# Patient Record
Sex: FEMALE | Race: WHITE | Employment: OTHER | URBAN - METROPOLITAN AREA
[De-identification: names, ages, dates, MRNs, and addresses within clinical notes are randomized per-mention and may not be internally consistent; named-entity substitution may affect disease eponyms.]

---

## 2017-06-14 ENCOUNTER — HOSPITAL ENCOUNTER (OUTPATIENT)
Dept: MAMMOGRAPHY | Age: 77
Discharge: HOME | End: 2017-06-14
Attending: INTERNAL MEDICINE | Admitting: INTERNAL MEDICINE

## 2017-06-14 DIAGNOSIS — Z12.31 ENCOUNTER FOR SCREENING MAMMOGRAM FOR MALIGNANT NEOPLASM OF BREAST: ICD-10-CM

## 2017-06-14 DIAGNOSIS — R92.8 OTHER ABNORMAL AND INCONCLUSIVE FINDINGS ON DIAGNOSTIC IMAGING OF BREAST: ICD-10-CM

## 2018-04-05 ENCOUNTER — HOSPITAL ENCOUNTER (OUTPATIENT)
Dept: GENERAL RADIOLOGY | Age: 78
Discharge: HOME | End: 2018-04-05
Attending: INTERNAL MEDICINE | Admitting: INTERNAL MEDICINE

## 2018-04-05 DIAGNOSIS — M81.0 AGE-RELATED OSTEOPOROSIS WITHOUT CURRENT PATHOLOGICAL FRACTURE: ICD-10-CM

## 2018-04-05 DIAGNOSIS — M81.0 OSTEOPOROSIS, UNSPECIFIED OSTEOPOROSIS TYPE, UNSPECIFIED PATHOLOGICAL FRACTURE PRESENCE: ICD-10-CM

## 2018-08-31 ENCOUNTER — HOSPITAL ENCOUNTER (OUTPATIENT)
Dept: MAMMOGRAPHY | Age: 78
Discharge: HOME | End: 2018-08-31
Payer: MEDICARE

## 2018-08-31 DIAGNOSIS — Z12.31 ENCOUNTER FOR SCREENING MAMMOGRAM FOR BREAST CANCER: ICD-10-CM

## 2018-08-31 PROCEDURE — 77067 SCR MAMMO BI INCL CAD: CPT

## 2019-04-01 ENCOUNTER — HOSPITAL ENCOUNTER (OUTPATIENT)
Dept: GENERAL RADIOLOGY | Age: 79
Discharge: HOME | End: 2019-04-01
Payer: MEDICARE

## 2019-04-01 ENCOUNTER — HOSPITAL ENCOUNTER (OUTPATIENT)
Age: 79
Discharge: HOME | End: 2019-04-01
Payer: MEDICARE

## 2019-04-01 DIAGNOSIS — R52 PAIN: ICD-10-CM

## 2019-04-01 PROCEDURE — 73000 X-RAY EXAM OF COLLAR BONE: CPT

## 2019-06-03 ENCOUNTER — OFFICE VISIT (OUTPATIENT)
Dept: ORTHOPEDIC SURGERY | Age: 79
End: 2019-06-03
Payer: MEDICARE

## 2019-06-03 VITALS — BODY MASS INDEX: 23.3 KG/M2 | HEIGHT: 66 IN | WEIGHT: 145 LBS

## 2019-06-03 DIAGNOSIS — M54.2 NECK PAIN: ICD-10-CM

## 2019-06-03 DIAGNOSIS — M47.22 OSTEOARTHRITIS OF SPINE WITH RADICULOPATHY, CERVICAL REGION: ICD-10-CM

## 2019-06-03 DIAGNOSIS — M25.511 ACUTE PAIN OF RIGHT SHOULDER: Primary | ICD-10-CM

## 2019-06-03 PROCEDURE — 99203 OFFICE O/P NEW LOW 30 MIN: CPT | Performed by: ORTHOPAEDIC SURGERY

## 2019-06-03 NOTE — PROGRESS NOTES
Chief Complaint  New Patient (Cervical: no injury , stiffness and pain started a couple of months ago, some numbness/tingling that comes and goes, the pain shoots down into neck on bilateral sides, but only has upper trap pain on the right side, increase pain with turning head to the right )      History of Present Illness:  Jack Quezada is a 66 y.o. y/o female who presents today for evaluation of her neck and shoulder pain. She denies any trauma. She states the last few months she has had some worsening pain in the neck and right side of the shoulder. She has taken some ibuprofen. She does occasionally have some numbness and tingling in her right upper extremity which extends down her arm and to her hand at times. She denies any contralateral symptoms. She denies any weakness. She denies any difficulty with small motor tasks. She denies any prior surgery or injections or therapy for her neck or shoulder.       Occupation/activities: Retired    Medical History  Past Medical History:   Diagnosis Date    Anesthesia complication     \" thrashed about\"    Cancer ([facility] [address] breast    Hyperlipidemia     Hypertension     S/P chemotherapy, time since greater than 12 weeks     Wears dentures        Past Surgical History:   Procedure Laterality Date    BREAST SURGERY Left     mastectomy    BREAST SURGERY Right     implant    CHOLECYSTECTOMY      COLONOSCOPY      HEMORRHOID SURGERY      HYSTERECTOMY      KNEE CARTILAGE SURGERY Left     TOTAL KNEE ARTHROPLASTY Left 11/8/2013       Social History     Socioeconomic History    Marital status:      Spouse name: Not on file    Number of children: Not on file    Years of education: Not on file    Highest education level: Not on file   Occupational History    Not on file   Social Needs    Financial resource strain: Not on file    Food insecurity:     Worry: Not on file     Inability: Not on file    Transportation needs:     Medical: Not on file     Non-medical: Not on file   Tobacco Use    Smoking status: Never Smoker    Smokeless tobacco: Never Used   Substance and Sexual Activity    Alcohol use: No    Drug use: No    Sexual activity: Not Currently   Lifestyle    Physical activity:     Days per week: Not on file     Minutes per session: Not on file    Stress: Not on file   Relationships    Social connections:     Talks on phone: Not on file     Gets together: Not on file     Attends Synagogue service: Not on file     Active member of club or organization: Not on file     Attends meetings of clubs or organizations: Not on file     Relationship status: Not on file    Intimate partner violence:     Fear of current or ex partner: Not on file     Emotionally abused: Not on file     Physically abused: Not on file     Forced sexual activity: Not on file   Other Topics Concern    Not on file   Social History Narrative    Not on file       No family history on file. Review of Systems  Pertinent items are noted in HPI  Review of systems reviewed from Patient History Form dated on 6/3/19 and available in the patient's chart under the Media tab. Vital Signs  There were no vitals filed for this visit. General Exam:   Constitutional: Patient is adequately groomed with no evidence of malnutrition  Mental Status: The patient is oriented to time, place and person. The patient's mood and affect are appropriate. Lymphatic: The lymphatic examination bilaterally reveals all areas to be without enlargement or induration. Neurological: The patient has good coordination. There is no weakness or sensory deficit.      Gait: Normal    Spine Examination  Inspection:  No bruising or atrophy or deformity    Palpation:  Paraspinal and trapezial tenderness mostly right side    Range of Motion:  Moderate stiffness with flexion and extension cervical spine, normal extremity range of motion    Sensation: Intact to light touch C5 to T1 bilateral    Strength:  5/5 shoulder abduction, elbow flexion, elbow extension, wrist flexion, wrist extension, finger abduction    Special Tests: Negative Spurling's today    Skin: There are no additional worrisome rashes, ulcerations or lesions. Circulation normal      Radiology:     X-rays obtained and reviewed in office:  AP and lateral 2 views cervical spine obtained 6/3/19 demonstrate significant degenerative changes most significant at C4-C5, C5-C6, and C6-C7 with decreased disc space and osteophyte formation. No major laura-or retrolisthesis      Assessment:  70-year-old female with cervical DJD and right upper extremity radiculopathy    Office Procedures:  Orders Placed This Encounter   Procedures    XR CERVICAL SPINE (2-3 VIEWS)     Standing Status:   Future     Number of Occurrences:   1     Standing Expiration Date:   6/3/2020   Miguel Pineda PT [facility]     Referral Priority:   Routine     Referral Type:   Eval and Treat     Referral Reason:   Specialty Services Required     Requested Specialty:   Physical Therapy     Number of Visits Requested:   1       Plan:   -At this time we will get her to physical therapy to work on her cervical symptoms  -In addition she may continue with over-the-counter medications, ice/heat, activity modification  -We discussed that if she continues with significant symptoms of neck pain and/or radiculopathy we can consider other pharmacological treatment such as a steroid Dosepak and also referral for further treatment with one of our spine interventional specialist and possible injections or other treatment  -If there are issues interim she'll contact the office    MD Helio King 58 partner of [facility]    Voice Recognition Dictation disclaimer: Please note that portions of this chart were generated using Dragon dictation software.  Although every effort was made to ensure the accuracy of this automated transcription, some errors in transcription may have occurred.

## 2019-06-04 ENCOUNTER — HOSPITAL ENCOUNTER (OUTPATIENT)
Dept: PHYSICAL THERAPY | Age: 79
Setting detail: THERAPIES SERIES
Discharge: HOME | End: 2019-06-04
Payer: MEDICARE

## 2019-06-04 PROCEDURE — 97161 PT EVAL LOW COMPLEX 20 MIN: CPT | Performed by: SPECIALIST

## 2019-06-04 PROCEDURE — 97012 MECHANICAL TRACTION THERAPY: CPT | Performed by: SPECIALIST

## 2019-06-04 PROCEDURE — 97140 MANUAL THERAPY 1/> REGIONS: CPT | Performed by: SPECIALIST

## 2019-06-04 PROCEDURE — 97110 THERAPEUTIC EXERCISES: CPT | Performed by: SPECIALIST

## 2019-06-04 NOTE — PLAN OF CARE
[address] [facility] and [address] 3560 [facility], [address] [place] [address] [address]  Phone: [phone]   Fax:     [phone]     Beatriz Skinner    Dear  Dr Darcy Sawant,    We had the pleasure of evaluating the following patient for physical therapy services at [address] [place] [address]. A summary of our findings can be found in the initial assessment below. This includes our plan of care. If you have any questions or concerns regarding these findings, please do not hesitate to contact me at the office phone number checked above. Thank you for the referral.       Physician Signature:_______________________________Date:__________________  By signing above (or electronic signature), therapists plan is approved by physician      Patient: Yen Cuevas   : 1940   MRN: [id number]  Referring Physician:  Dr Darcy Sawant      Evaluation Date: 2019      Medical Diagnosis Information:      M54.2 (ICD-10-CM) - Neck pain   M47.22 (ICD-10-CM) - Osteoarthritis of spine with radiculopathy, cervical region                                                  Insurance information:  [facility]    Precautions/ Contra-indications: CA    Latex Allergy:  [x]NO      []YES  Preferred Language for Healthcare:   [x]English       []other:    SUBJECTIVE: Patient states insidious onset a few months ago with cervical pain, difficulty with rotation.     Relevant Medical History:CA, HTN  Functional Disability Index:      Pain Scale: 5/10  Easing factors: UE exercises  Provocative factors: rotation, sleeping , sleeping supine    Type: []Constant   [x]Intermittent  []Radiating []Localized []other:     Numbness/Tingling: R  forearm    Occupation/School:retired     Living Status/Prior Level of Function: Independent with ADLs and IADLs,     OBJECTIVE:     CERV ROM     Cervical Flexion 15    Cervical Extension 40    Cervical SB R 10/ L 15    Cervical rotation          ROM Left Right Shoulder Flex WFL all motions    Shoulder Abd     Shoulder ER     Shoulder IR               Strength  Left Right   Shoulder Flex 4+ 4+   Shoulder Scap 4+ 4+   Shoulder ER     Shoulder IR               Reflexes Left Right   C5-6 Biceps     C5-6 Brachioradialis     C7-8 Triceps       Reflexes/Sensation:    [x]Dermatomes/Myotomes intact    [x]Reflexes equal and normal bilaterally   []Other:    Joint mobility:    [x]Normal    []Hypo   []Hyper    Palpation: Tenderness C/UT    Functional Mobility/Transfers: WNL    Posture: WFL    Bandages/Dressings/Incisions: intact    Gait: (include devices/WB status): WNL     Orthopedic Special Tests: C compression pain  Traction less pain                       [x] Patient history, allergies, meds reviewed. Medical chart reviewed. See intake form. Review Of Systems (ROS):  [x]Performed Review of systems (Integumentary, CardioPulmonary, Neurological) by intake and observation. Intake form has been scanned into medical record. Patient has been instructed to contact their primary care physician regarding ROS issues if not already being addressed at this time.       Co-morbidities/Complexities (which will affect course of rehabilitation):   []None           Arthritic conditions   []Rheumatoid arthritis (M05.9)  []Osteoarthritis (M19.91)   Cardiovascular conditions   [x]Hypertension (I10)  []Hyperlipidemia (E78.5)  []Angina pectoris (I20)  []Atherosclerosis (I70)   Musculoskeletal conditions   []Disc pathology   []Congenital spine pathologies   []Prior surgical intervention  []Osteoporosis (M81.8)  []Osteopenia (M85.8)   Endocrine conditions   []Hypothyroid (E03.9)  []Hyperthyroid Gastrointestinal conditions   []Constipation (U44.62)   Metabolic conditions   []Morbid obesity (E66.01)  []Diabetes type 1(E10.65) or 2 (E11.65)   []Neuropathy (G60.9)     Pulmonary conditions   []Asthma (J45)  []Coughing   []COPD (J44.9)   Psychological Disorders  []Anxiety (F41.9)  []Depression (F32.9) []Other:   []Other:          Barriers to/and or personal factors that will affect rehab potential:              []Age  []Sex              []Motivation/Lack of Motivation                        []Co-Morbidities              []Cognitive Function, education/learning barriers              []Environmental, home barriers              []profession/work barriers  []past PT/medical experience  []other:  Justification:     Falls Risk Assessment (30 days):   [x] Falls Risk assessed and no intervention required.   [] Falls Risk assessed and Patient requires intervention due to being higher risk   TUG score (>12s at risk):     [] Falls education provided, including       G-Codes:   NDI 40%    ASSESSMENT:    Functional Impairments:     []Noted cervical/thoracic/GHJ joint hypomobility   []Noted cervical/thoracic/GHJ joint hypermobility   [x]Decreased cervical/UE functional ROM   []Noted Headache pain aggravated by neck movements with/without dizziness   []Abnormal reflexes/sensation/myotomal/dermatomal deficits   []Decreased DCF control or ability to hold head up   [x]Decreased RC/scapular/core strength and neuromuscular control    [x]Decreased UE functional strength   []other:      Functional Activity Limitations (from functional questionnaire and intake)   [x]Reduced ability to tolerate prolonged functional positions   []Reduced ability or difficulty with changes of positions or transfers between positions   [x]Reduced ability to maintain good posture and demonstrate good body mechanics with sitting, bending, and lifting   [x] Reduced ability or tolerance with driving and/or computer work   [x]Reduced ability to perform lifting, reaching, carrying tasks   []Reduced ability to concentrate   [x]Reduced ability to sleep    []Reduced ability to tolerate any impact through UE or spine   [x]Reduced ability to ambulate prolonged functional periods/distances   []other:    Participation Restrictions   [x]Reduced participation in self care activities   [x]Reduced participation in home management activities   []Reduced participation in work activities   []Reduced participation in social activities. []Reduced participation in sport/recreational activities. Classification/Subgrouping:   [x]signs/symptoms consistent with neck pain with mobility deficits     []signs/symptoms consistent with neck pain with movement coordinated impairments    [x]signs/symptoms consistent with neck pain with radiating pain    []signs/symptoms consistent with neck pain with headaches (cervicogenic)    []Signs/symptoms consistent with nerve root involvement including myotome & dermatome dysfunction   []sign/symptoms consistent with facet dysfunction of cervical and thoracic spine    []signs/symptoms consistent suggesting central cord compression/UMN syndromes   []signs/symptoms consistent with discogenic cervical pain   []signs/symptoms consistent with rib dysfunction   []signs/symptoms consistent with postural dysfunction   []signs/symptoms consistent with shoulder pathology    []signs/symptoms consistent with post-surgical status including decreased ROM, strength and function.    [x]signs/symptoms consistent with pathology which may benefit from Dry Needling   []signs/symptoms which may limit the use of advanced manual therapy techniques: (Hypertension, recent trauma, intolerance to end range positions, prior TIA, visual issues, UE myotomes loss )     Prognosis/Rehab Potential:      []Excellent   [x]Good    []Fair   []Poor    Tolerance of evaluation/treatment:    []Excellent   [x]Good    []Fair   []Poor    Physical Therapy Evaluation Complexity Justification  [x] A history of present problem with:  [x] no personal factors and/or comorbidities that impact the plan of care;  []1-2 personal factors and/or comorbidities that impact the plan of care  []3 personal factors and/or comorbidities that impact the plan of care  [x] An examination of body systems using standardized tests and measures addressing any of the following: body structures and functions (impairments), activity limitations, and/or participation restrictions;:  [x] a total of 1-2 or more elements   [] a total of 3 or more elements   [] a total of 4 or more elements   [x] A clinical presentation with:  [x] stable and/or uncomplicated characteristics   [] evolving clinical presentation with changing characteristics  [] unstable and unpredictable characteristics;   [x] Clinical decision making of [x] low, [] moderate, [] high complexity using standardized patient assessment instrument and/or measurable assessment of functional outcome. [x] EVAL (LOW) 09655 (typically 20 minutes face-to-face)  [] EVAL (MOD) 82901 (typically 30 minutes face-to-face)  [] EVAL (HIGH) 64135 (typically 45 minutes face-to-face)  [] RE-EVAL     PLAN:   Frequency/Duration:  1-2 days per week for 6 Weeks:  Interventions:  [x]  Therapeutic exercise including: strength training, ROM, for cervical spine,scapula, core and Upper extremity, including postural re-education. [x]  NMR activation and proprioception for Deep cervical flexors, periscapular and RC muscles and Core, including postural re-education. [x]  Manual therapy as indicated for C/T spine, ribs, Soft tissue to include: Dry Needling/IASTM, STM, PROM, Gr I-IV mobilizations, manipulation. [x] Modalities as needed that may include: thermal agents, E-stim, Biofeedback, US, iontophoresis as indicated  [x] Patient education on joint protection, postural re-education, activity modification, progression of HEP. HEP instruction: instructed and given handouts(see scanned forms)    GOALS:  Patient stated goal: sleep/turn head    Therapist goals for Patient:   Short Term Goals: To be achieved in: 2 weeks  1. Independent in HEP and progression per patient tolerance, in order to prevent re-injury.    2. Patient will have a decrease in pain to facilitate improvement in movement, function, and ADLs as indicated by Functional Deficits. Long Term Goals: To be achieved in: 6 weeks  1. Disability index score of 20% or less for the NDI to assist with reaching prior level of function. 2. Patient will demonstrate increased AROM to [facility] of cervical/thoracic spine to allow for proper joint functioning as indicated by patients Functional Deficits. 3. Patient will demonstrate an increase in postural awareness and control and activation of  Deep cervical stabilizers to allow for proper functional mobility as indicated by patients Functional Deficits. 4. Patient will return to [facility] for functional activities without increased symptoms or restriction.    5. Sleep with min to no limitations.(patient specific functional goal)       Electronically signed by:  Nic Floyd PT

## 2019-06-04 NOTE — FLOWSHEET NOTE
[address]    [place], [address]                Physical Therapy Daily Treatment Note  Date:  2019    Patient Name:  Selene Thornton    :  1940  MRN: [id number]  Restrictions/Precautions:  none  Medical/Treatment Diagnosis Information:      M54.2 (ICD-10-CM) - Neck pain   M47.22 (ICD-10-CM) - Osteoarthritis of spine with radiculopathy, cervical region     ·   ·    Insurance/Certification information:   [employer]na  W Rocky Pierce  Physician Information:   Dr Deb Dow  Plan of care signed (Y/N):     Date of Patient follow up with Physician:     G-Code (if applicable):      Date G-Code Applied:      NDI 40%    Progress Note: [x]  Yes  []  No  Next due by: Visit #10      Latex Allergy:  [x]NO      []YES  Preferred Language for Healthcare:   [x]English       []other:    Visit # Insurance Allowable   1 A[employer] MC 90/10med nec     Pain level:  5/10     SUBJECTIVE:  See eval    OBJECTIVE: See eval  Observation:   Test measurements:      RESTRICTIONS/PRECAUTIONS: none    Exercises/Interventions:   Therapeutic Ex Sets/sec Reps Notes HEP   Chin tucks/ also supine  10x  x   PB rows/PB rolls GR 10x  x   UT stretch 20 2x  x                        PB B HAB  NV            UBE rev  NV                                                                           Manual Intervention       MFR/ distraction/glides  8 min                                               NMR re-education                                                        C traction 19/5 10 min         Therapeutic Exercise and NMR EXR  [x] (30090) Provided verbal/tactile cueing for activities related to strengthening, flexibility, endurance, ROM  for improvements in scapular, scapulothoracic and UE control with self care, reaching, carrying, lifting, house/yardwork, driving/computer work.    [] (93465) Provided verbal/tactile cueing for activities related to improving balance, coordination, kinesthetic sense, posture, motor skill, proprioception  to assist with  scapular, scapulothoracic and UE control with self care, reaching, carrying, lifting, house/yardwork, driving/computer work. Therapeutic Activities:    [x] (76430 or 94746) Provided verbal/tactile cueing for activities related to improving balance, coordination, kinesthetic sense, posture, motor skill, proprioception and motor activation to allow for proper function of scapular, scapulothoracic and UE control with self care, carrying, lifting, driving/computer work.      Home Exercise Program:    [x] (62492) Reviewed/Progressed HEP activities related to strengthening, flexibility, endurance, ROM of scapular, scapulothoracic and UE control with self care, reaching, carrying, lifting, house/yardwork, driving/computer work  [] (53302) Reviewed/Progressed HEP activities related to improving balance, coordination, kinesthetic sense, posture, motor skill, proprioception of scapular, scapulothoracic and UE control with self care, reaching, carrying, lifting, house/yardwork, driving/computer work      Manual Treatments:  PROM / STM / Oscillations-Mobs:  G-I, II, III, IV (PA's, Inf., Post.)  [x] (09945) Provided manual therapy to mobilize soft tissue/joints of cervical/CT, scapular GHJ and UE for the purpose of modulating pain, promoting relaxation,  increasing ROM, reducing/eliminating soft tissue swelling/inflammation/restriction, improving soft tissue extensibility and allowing for proper ROM for normal function with self care, reaching, carrying, lifting, house/yardwork, driving/computer work    Modalities: C traction     Charges:  Timed Code Treatment Minutes: 25   Total Treatment Minutes: 55     [x] EVAL  [x] VS(42151) x  1   [] IONTO  [] NMR (38290) x      [] VASO  [x] Manual (68882) x  1    [] Other:  [] TA x       [x] Mech Traction (58808)  [] ES(attended) (41039)      [] ES (un) (20926):     GOALS:   Patient stated goal: sleep/turn head    Therapist goals for Patient:   Short Term Goals: To be achieved in: 2 weeks  1. Independent in HEP and progression per patient tolerance, in order to prevent re-injury. 2. Patient will have a decrease in pain to facilitate improvement in movement, function, and ADLs as indicated by Functional Deficits. Long Term Goals: To be achieved in: 6 weeks  1. Disability index score of 20% or less for the NDI to assist with reaching prior level of function. 2. Patient will demonstrate increased AROM to [facility] of cervical/thoracic spine to allow for proper joint functioning as indicated by patients Functional Deficits. 3. Patient will demonstrate an increase in postural awareness and control and activation of  Deep cervical stabilizers to allow for proper functional mobility as indicated by patients Functional Deficits. 4. Patient will return to [facility] for functional activities without increased symptoms or restriction. 5. Sleep with min to no limitations.(patient specific functional goal)       Progression Towards Functional goals:  [] Patient is progressing as expected towards functional goals listed. [] Progression is slowed due to complexities listed. [] Progression has been slowed due to co-morbidities.   [x] Plan just implemented, too soon to assess goals progression  [] Other:     ASSESSMENT:  See eval    Treatment/Activity Tolerance:  [x] Patient tolerated treatment well [] Patient limited by fatique  [] Patient limited by pain  [] Patient limited by other medical complications  [] Other:     Prognosis: [x] Good [] Fair  [] Poor    Patient Requires Follow-up: [x] Yes  [] No    PLAN: See eval  [] Continue per plan of care [] Alter current plan (see comments)  [x] Plan of care initiated [] Hold pending MD visit [] Discharge    Electronically signed by: Edgar Ahmadi PT

## 2019-06-05 ENCOUNTER — HOSPITAL ENCOUNTER (OUTPATIENT)
Dept: PHYSICAL THERAPY | Age: 79
Setting detail: THERAPIES SERIES
Discharge: HOME | End: 2019-06-05
Payer: MEDICARE

## 2019-06-05 PROCEDURE — 97140 MANUAL THERAPY 1/> REGIONS: CPT | Performed by: SPECIALIST

## 2019-06-05 PROCEDURE — 97012 MECHANICAL TRACTION THERAPY: CPT | Performed by: SPECIALIST

## 2019-06-05 PROCEDURE — 97110 THERAPEUTIC EXERCISES: CPT | Performed by: SPECIALIST

## 2019-06-05 NOTE — FLOWSHEET NOTE
[address]    [place], [address]                Physical Therapy Daily Treatment Note  Date:  2019    Patient Name:  Ivy Smart    :  1940  MRN: [id number]  Restrictions/Precautions:  none  Medical/Treatment Diagnosis Information:      M54.2 (ICD-10-CM) - Neck pain   M47.22 (ICD-10-CM) - Osteoarthritis of spine with radiculopathy, cervical region     ·      Insurance/Certification information:   aetna  W Rocky Pierce  Physician Information:   Dr Iftikhar Hernández  Plan of care signed (Y/N):     Date of Patient follow up with Physician:     G-Code (if applicable):      Date G-Code Applied:      NDI 40%    Progress Note: [x]  Yes  []  No  Next due by: Visit #10      Latex Allergy:  [x]NO      []YES  Preferred Language for Healthcare:   [x]English       []other:    Visit # Insurance Allowable   2 Aetna MC 90/10med nec     Pain level:  5/10     SUBJECTIVE:  Compliance with HEP   Slept better    OBJECTIVE: See eval  Observation:   Test measurements:      RESTRICTIONS/PRECAUTIONS: none    Exercises/Interventions:   Therapeutic Ex Sets/sec Reps Notes HEP   Chin tucks/ also supine  10x  x   PB rows/PB rolls GR 10x  x   UT stretch 20 2x  x                        PB B HAB GR 10x  x          UBE rev  3 min                                                                           Manual Intervention       MFR/ distraction/glides  8 min Reviewed racquet balls                                              NMR re-education                                                        C traction 19/5 10 min         Therapeutic Exercise and NMR EXR  [x] (86754) Provided verbal/tactile cueing for activities related to strengthening, flexibility, endurance, ROM  for improvements in scapular, scapulothoracic and UE control with self care, reaching, carrying, lifting, house/yardwork, driving/computer work.    [] (19275) Provided verbal/tactile cueing for

## 2019-06-10 ENCOUNTER — HOSPITAL ENCOUNTER (OUTPATIENT)
Dept: PHYSICAL THERAPY | Age: 79
Setting detail: THERAPIES SERIES
Discharge: HOME | End: 2019-06-10
Payer: MEDICARE

## 2019-06-10 PROCEDURE — 97012 MECHANICAL TRACTION THERAPY: CPT | Performed by: SPECIALIST

## 2019-06-10 PROCEDURE — 97140 MANUAL THERAPY 1/> REGIONS: CPT | Performed by: SPECIALIST

## 2019-06-10 PROCEDURE — 97110 THERAPEUTIC EXERCISES: CPT | Performed by: SPECIALIST

## 2019-06-10 NOTE — FLOWSHEET NOTE
[address]    [place], [address]                Physical Therapy Daily Treatment Note  Date:  6/10/2019    Patient Name:  Zia Cifuentes    :  1940  MRN: [id number]  Restrictions/Precautions:  none  Medical/Treatment Diagnosis Information:      M54.2 (ICD-10-CM) - Neck pain   M47.22 (ICD-10-CM) - Osteoarthritis of spine with radiculopathy, cervical region     ·      Insurance/Certification information:   aetna  W Rocky Pierce  Physician Information:   Dr Pb Murphy  Plan of care signed (Y/N):     Date of Patient follow up with Physician:     G-Code (if applicable):      Date G-Code Applied:      NDI 40%    Progress Note: [x]  Yes  []  No  Next due by: Visit #10      Latex Allergy:  [x]NO      []YES  Preferred Language for Healthcare:   [x]English       []other:    Visit # Insurance Allowable   3 Aetna MC 90/10med nec     Pain level:  4/10     SUBJECTIVE:  Compliance with HEP   Slept better    OBJECTIVE: See eval  Observation:   Test measurements:      RESTRICTIONS/PRECAUTIONS: none    Exercises/Interventions:   Therapeutic Ex Sets/sec Reps Notes HEP   Chin tucks/ also supine  10x  x   PB rows/PB rolls GR 10x  x   UT stretch 20 2x  x   Doorway stretch 20 3x                   PB B HAB GR 10x  x          UBE rev  3 min                                                                           Manual Intervention       MFR/ distraction/glides  8 min                                               NMR re-education                                                        C traction 19/5 10 min         Therapeutic Exercise and NMR EXR  [x] (09753) Provided verbal/tactile cueing for activities related to strengthening, flexibility, endurance, ROM  for improvements in scapular, scapulothoracic and UE control with self care, reaching, carrying, lifting, house/yardwork, driving/computer work.    [] (26646) Provided verbal/tactile cueing for activities related to improving balance, coordination, kinesthetic sense, posture, motor skill, proprioception  to assist with  scapular, scapulothoracic and UE control with self care, reaching, carrying, lifting, house/yardwork, driving/computer work. Therapeutic Activities:    [x] (80400 or 15877) Provided verbal/tactile cueing for activities related to improving balance, coordination, kinesthetic sense, posture, motor skill, proprioception and motor activation to allow for proper function of scapular, scapulothoracic and UE control with self care, carrying, lifting, driving/computer work.      Home Exercise Program:    [x] (78680) Reviewed/Progressed HEP activities related to strengthening, flexibility, endurance, ROM of scapular, scapulothoracic and UE control with self care, reaching, carrying, lifting, house/yardwork, driving/computer work  [] (66376) Reviewed/Progressed HEP activities related to improving balance, coordination, kinesthetic sense, posture, motor skill, proprioception of scapular, scapulothoracic and UE control with self care, reaching, carrying, lifting, house/yardwork, driving/computer work      Manual Treatments:  PROM / STM / Oscillations-Mobs:  G-I, II, III, IV (PA's, Inf., Post.)  [x] (90133) Provided manual therapy to mobilize soft tissue/joints of cervical/CT, scapular GHJ and UE for the purpose of modulating pain, promoting relaxation,  increasing ROM, reducing/eliminating soft tissue swelling/inflammation/restriction, improving soft tissue extensibility and allowing for proper ROM for normal function with self care, reaching, carrying, lifting, house/yardwork, driving/computer work    Modalities: C traction     Charges:  Timed Code Treatment Minutes: 30   Total Treatment Minutes: 40     [] EVAL  [x] BL(49792) x  1   [] IONTO  [] NMR (08684) x      [] VASO  [x] Manual (85220) x  1    [] Other:  [] TA x       [x] Mech Traction (13769)  [] ES(attended) (01360)      [] ES (un) (63141):

## 2019-06-17 ENCOUNTER — HOSPITAL ENCOUNTER (OUTPATIENT)
Dept: PHYSICAL THERAPY | Age: 79
Setting detail: THERAPIES SERIES
Discharge: HOME | End: 2019-06-17
Payer: MEDICARE

## 2019-06-17 PROCEDURE — 97110 THERAPEUTIC EXERCISES: CPT | Performed by: SPECIALIST

## 2019-06-17 PROCEDURE — 97140 MANUAL THERAPY 1/> REGIONS: CPT | Performed by: SPECIALIST

## 2019-06-17 PROCEDURE — 97012 MECHANICAL TRACTION THERAPY: CPT | Performed by: SPECIALIST

## 2019-06-17 NOTE — FLOWSHEET NOTE
[address]    [place], [address]                Physical Therapy Daily Treatment Note  Date:  2019    Patient Name:  Kay Banerjee    :  1940  MRN: [id number]  Restrictions/Precautions:  none  Medical/Treatment Diagnosis Information:      M54.2 (ICD-10-CM) - Neck pain   M47.22 (ICD-10-CM) - Osteoarthritis of spine with radiculopathy, cervical region     ·      Insurance/Certification information:   aetna  W Rocky Pierce  Physician Information:   Dr Katherine Gallardo  Plan of care signed (Y/N):     Date of Patient follow up with Physician:     G-Code (if applicable):      Date G-Code Applied:      NDI 40%    Progress Note: [x]  Yes  []  No  Next due by: Visit #10      Latex Allergy:  [x]NO      []YES  Preferred Language for Healthcare:   [x]English       []other:    Visit # Insurance Allowable   4 Aetna MC 90/10med nec     Pain level:  0/10     SUBJECTIVE:  Compliance with HEP   Slept better    OBJECTIVE: See eval  Observation:   Test measurements:      RESTRICTIONS/PRECAUTIONS: none    Exercises/Interventions:   Therapeutic Ex Sets/sec Reps Notes HEP   Chin tucks/ also supine  10x  x   PB rows/PB rolls GR 10x  x   UT stretch 20 2x  x   Doorway stretch 20 3x                   PB B HAB GR 10x  x          UBE rev  3 min                                                                           Manual Intervention       MFR/ distraction/glides  8 min                                               NMR re-education                                                        C traction 19/5 10 min         Therapeutic Exercise and NMR EXR  [x] (53927) Provided verbal/tactile cueing for activities related to strengthening, flexibility, endurance, ROM  for improvements in scapular, scapulothoracic and UE control with self care, reaching, carrying, lifting, house/yardwork, driving/computer work.    [] (89825) Provided verbal/tactile cueing for activities related to improving balance, coordination, kinesthetic sense, posture, motor skill, proprioception  to assist with  scapular, scapulothoracic and UE control with self care, reaching, carrying, lifting, house/yardwork, driving/computer work. Therapeutic Activities:    [x] (54780 or 86648) Provided verbal/tactile cueing for activities related to improving balance, coordination, kinesthetic sense, posture, motor skill, proprioception and motor activation to allow for proper function of scapular, scapulothoracic and UE control with self care, carrying, lifting, driving/computer work.      Home Exercise Program:    [x] (10074) Reviewed/Progressed HEP activities related to strengthening, flexibility, endurance, ROM of scapular, scapulothoracic and UE control with self care, reaching, carrying, lifting, house/yardwork, driving/computer work  [] (90323) Reviewed/Progressed HEP activities related to improving balance, coordination, kinesthetic sense, posture, motor skill, proprioception of scapular, scapulothoracic and UE control with self care, reaching, carrying, lifting, house/yardwork, driving/computer work      Manual Treatments:  PROM / STM / Oscillations-Mobs:  G-I, II, III, IV (PA's, Inf., Post.)  [x] (65548) Provided manual therapy to mobilize soft tissue/joints of cervical/CT, scapular GHJ and UE for the purpose of modulating pain, promoting relaxation,  increasing ROM, reducing/eliminating soft tissue swelling/inflammation/restriction, improving soft tissue extensibility and allowing for proper ROM for normal function with self care, reaching, carrying, lifting, house/yardwork, driving/computer work    Modalities: C traction     Charges:  Timed Code Treatment Minutes: 30   Total Treatment Minutes: 40     [] EVAL  [x] SJ(81603) x  1   [] IONTO  [] NMR (02240) x      [] VASO  [x] Manual (69013) x  1    [] Other:  [] TA x       [x] Mech Traction (53727)  [] ES(attended) (94338)      [] ES (un) (07611): GOALS:   Patient stated goal: sleep/turn head    Therapist goals for Patient:   Short Term Goals: To be achieved in: 2 weeks  1. Independent in HEP and progression per patient tolerance, in order to prevent re-injury. 2. Patient will have a decrease in pain to facilitate improvement in movement, function, and ADLs as indicated by Functional Deficits. Long Term Goals: To be achieved in: 6 weeks  1. Disability index score of 20% or less for the NDI to assist with reaching prior level of function. 2. Patient will demonstrate increased AROM to [facility] of cervical/thoracic spine to allow for proper joint functioning as indicated by patients Functional Deficits. 3. Patient will demonstrate an increase in postural awareness and control and activation of  Deep cervical stabilizers to allow for proper functional mobility as indicated by patients Functional Deficits. 4. Patient will return to [facility] for functional activities without increased symptoms or restriction. 5. Sleep with min to no limitations.(patient specific functional goal)       Progression Towards Functional goals:  [x] Patient is progressing as expected towards functional goals listed. [] Progression is slowed due to complexities listed. [] Progression has been slowed due to co-morbidities.   [] Plan just implemented, too soon to assess goals progression  [] Other:     ASSESSMENT: Less soreness with MFR, relief with traction    Treatment/Activity Tolerance:  [x] Patient tolerated treatment well [] Patient limited by fatique  [] Patient limited by pain  [] Patient limited by other medical complications  [] Other:     Prognosis: [x] Good [] Fair  [] Poor    Patient Requires Follow-up: [x] Yes  [] No    PLAN:   [x] Continue per plan of care [] Alter current plan (see comments)  [] Plan of care initiated [] Hold pending MD visit [] Discharge    Electronically signed by: Jay Friedman PT

## 2019-06-18 ENCOUNTER — HOSPITAL ENCOUNTER (OUTPATIENT)
Dept: PHYSICAL THERAPY | Age: 79
Setting detail: THERAPIES SERIES
Discharge: HOME | End: 2019-06-18
Payer: MEDICARE

## 2019-06-18 PROCEDURE — 97110 THERAPEUTIC EXERCISES: CPT | Performed by: SPECIALIST

## 2019-06-18 PROCEDURE — 97012 MECHANICAL TRACTION THERAPY: CPT | Performed by: SPECIALIST

## 2019-06-18 NOTE — DISCHARGE SUMMARY
[address] [place], [address]                 Physical Therapy Discharge Summary    Dear  Dr Iveth Ware,    We had the pleasure of treating the following patient for physical therapy services at [address] [place] [address]. A summary of our findings can be found in the discharge summary below. If you have any questions or concerns regarding these findings, please do not hesitate to contact me at the office phone number checked above. Thank you for the referral.     Physician Signature:________________________________Date:__________________  By signing above (or electronic signature), therapists plan is approved by physician      Overall Response to Treatment:   [x]Patient is responding well to treatment and improvement is noted with regards  to goals   []Patient should continue to improve in reasonable time if they continue HEP   []Patient has plateaued and is no longer responding to skilled PT intervention    []Patient is getting worse and would benefit from return to referring MD   []Patient unable to adhere to initial POC   [x]Other: Patient progressed well with PT, less pain, increased function,D/C to HEP and requesting home [facility] Cervical traction unit.       Date range of Visits: 19-19    Total Visits: 5  Date:  2019    Patient Name:  Gilbert Conner    :  1940  MRN: [id number]  Restrictions/Precautions:  none  Medical/Treatment Diagnosis Information:      M54.2 (ICD-10-CM) - Neck pain   M47.22 (ICD-10-CM) - Osteoarthritis of spine with radiculopathy, cervical region     ·      Insurance/Certification information:   aetna [facility]  Physician Information:   Dr Iveth Ware  Plan of care signed (Y/N):     Date of Patient follow up with Physician:     G-Code (if applicable):      Date G-Code Applied:      NDI 8%    Progress Note: [x]  Yes  []  No  Next due by: Visit #10      Latex Allergy:  [x]NO self care, reaching, carrying, lifting, house/yardwork, driving/computer work  [] (84464) Reviewed/Progressed HEP activities related to improving balance, coordination, kinesthetic sense, posture, motor skill, proprioception of scapular, scapulothoracic and UE control with self care, reaching, carrying, lifting, house/yardwork, driving/computer work      Manual Treatments:  PROM / STM / Oscillations-Mobs:  G-I, II, III, IV (PA's, Inf., Post.)  [x] (76740) Provided manual therapy to mobilize soft tissue/joints of cervical/CT, scapular GHJ and UE for the purpose of modulating pain, promoting relaxation,  increasing ROM, reducing/eliminating soft tissue swelling/inflammation/restriction, improving soft tissue extensibility and allowing for proper ROM for normal function with self care, reaching, carrying, lifting, house/yardwork, driving/computer work    Modalities: C traction     Charges:  Timed Code Treatment Minutes: 15   Total Treatment Minutes: 27     [] EVAL  [x] KG(09136) x  1   [] IONTO  [] NMR (15182) x      [] VASO  [] Manual (47376) x  1    [] Other:  [] TA x       [x] Mech Traction (96170)  [] ES(attended) (86128)      [] ES (un) (48272):     GOALS:   Patient stated goal: sleep/turn head    Therapist goals for Patient:   Short Term Goals: To be achieved in: 2 weeks  1. Independent in HEP and progression per patient tolerance, in order to prevent re-injury. 2. Patient will have a decrease in pain to facilitate improvement in movement, function, and ADLs as indicated by Functional Deficits. Long Term Goals: To be achieved in: 6 weeks  Met D/C goals  1. Disability index score of 20% or less for the NDI to assist with reaching prior level of function. 2. Patient will demonstrate increased AROM to [facility] of cervical/thoracic spine to allow for proper joint functioning as indicated by patients Functional Deficits.    3. Patient will demonstrate an increase in postural awareness and control and activation of Deep cervical stabilizers to allow for proper functional mobility as indicated by patients Functional Deficits. 4. Patient will return to [facility] for functional activities without increased symptoms or restriction. 5. Sleep with min to no limitations.(patient specific functional goal)       Progression Towards Functional goals:  [x] Patient is progressing as expected towards functional goals listed. [] Progression is slowed due to complexities listed. [] Progression has been slowed due to co-morbidities.   [] Plan just implemented, too soon to assess goals progression  [] Other:     ASSESSMENT:  Relief with traction, recommend [facility] home traction    Treatment/Activity Tolerance:  [x] Patient tolerated treatment well [] Patient limited by fatique  [] Patient limited by pain  [] Patient limited by other medical complications  [] Other:     Prognosis: [x] Good [] Fair  [] Poor    Patient Requires Follow-up: [x] Yes  [] No    PLAN:   [] Continue per plan of care [] Alter current plan (see comments)  [] Plan of care initiated [] Hold pending MD visit [x] Discharge    Electronically signed by: Enrique Sterling PT

## 2019-06-19 DIAGNOSIS — G89.29 CHRONIC RIGHT SHOULDER PAIN: ICD-10-CM

## 2019-06-19 DIAGNOSIS — M25.511 RIGHT SHOULDER PAIN, UNSPECIFIED CHRONICITY: Primary | ICD-10-CM

## 2019-06-19 DIAGNOSIS — M47.22 CERVICAL SPONDYLOSIS WITH RADICULOPATHY: ICD-10-CM

## 2019-06-19 DIAGNOSIS — M25.511 CHRONIC RIGHT SHOULDER PAIN: ICD-10-CM

## 2019-06-19 PROCEDURE — MISC320 HOME CERVICAL TRACTION UNIT: Performed by: ORTHOPAEDIC SURGERY

## 2019-08-07 ENCOUNTER — OFFICE VISIT (OUTPATIENT)
Dept: ORTHOPEDIC SURGERY | Age: 79
End: 2019-08-07
Payer: MEDICARE

## 2019-08-07 VITALS
BODY MASS INDEX: 23.31 KG/M2 | SYSTOLIC BLOOD PRESSURE: 140 MMHG | DIASTOLIC BLOOD PRESSURE: 67 MMHG | WEIGHT: 145.06 LBS | HEART RATE: 65 BPM | HEIGHT: 66 IN

## 2019-08-07 DIAGNOSIS — M25.561 ACUTE PAIN OF RIGHT KNEE: ICD-10-CM

## 2019-08-07 DIAGNOSIS — R52 PAIN: Primary | ICD-10-CM

## 2019-08-07 PROCEDURE — 99213 OFFICE O/P EST LOW 20 MIN: CPT | Performed by: PHYSICIAN ASSISTANT

## 2019-08-07 RX ORDER — MELOXICAM 15 MG/1
15 TABLET ORAL DAILY PRN
Qty: 30 TABLET | Refills: 0 | Status: SHIPPED | OUTPATIENT
Start: 2019-08-07 | End: 2019-12-10 | Stop reason: ALTCHOICE

## 2019-08-07 NOTE — PROGRESS NOTES
Hypertension     S/P chemotherapy, time since greater than 12 weeks     Wears dentures       Past Surgical History:     Past Surgical History:   Procedure Laterality Date    BREAST SURGERY Left     mastectomy    BREAST SURGERY Right     implant    CHOLECYSTECTOMY      COLONOSCOPY      HEMORRHOID SURGERY      HYSTERECTOMY      KNEE CARTILAGE SURGERY Left     TOTAL KNEE ARTHROPLASTY Left 11/8/2013     Current Medications:     Current Outpatient Medications:     aspirin 325 MG tablet, Take 1 tablet by mouth 2 times daily (with meals). Take 1 tablet after breakfast and 1 tablet after dinner, Disp: 70 tablet, Rfl: 3    Coenzyme Q10 (CO Q 10) 100 MG CAPS, Take 1 capsule by mouth 2 times daily. , Disp: , Rfl:     B Complex Vitamins (VITAMIN-B COMPLEX PO), Take 1 tablet by mouth daily. , Disp: , Rfl:     calcium carbonate (OSCAL) 500 MG TABS tablet, Take 500 mg by mouth daily. , Disp: , Rfl:     therapeutic multivitamin-minerals (THERAGRAN-M) tablet, Take 1 tablet by mouth daily. , Disp: , Rfl:     lisinopril (PRINIVIL;ZESTRIL) 10 MG tablet, Take 10 mg by mouth daily. , Disp: , Rfl:     simvastatin (ZOCOR) 20 MG tablet, Take 20 mg by mouth nightly.  , Disp: , Rfl:   Allergies:  Promethazine hcl; Zofran [ondansetron hcl]; Ciprofloxacin; Ondansetron hcl; and Morphine  Social History:    reports that she has never smoked. She has never used smokeless tobacco. She reports that she does not drink alcohol or use drugs. Family History:   No family history on file. REVIEW OF SYSTEMS:   Pertinent items are noted in HPI  Review of systems reviewed from Patient History Form dated on August 7, 2019 and available in the patient's chart under the Media tab.      CONSTITUTIONAL: Denies unexplained weight loss, fevers, chills or fatigue  NEUROLOGICAL: Denies unsteady gait or progressive weakness  SKIN: Denies skin changes, delayed healing, rash, itching     PHYSICAL EXAMINATION:   Vitals: Blood pressure (!) 140/67, pulse

## 2019-08-09 ENCOUNTER — TELEPHONE (OUTPATIENT)
Dept: ORTHOPEDIC SURGERY | Age: 79
End: 2019-08-09

## 2019-08-09 NOTE — TELEPHONE ENCOUNTER
KEENANM with patient and informed them that their MRI/CT has been authorized and that they can call and schedule scan at their convenience. Also told them that they can call and schedule a f/u with Dr. Rema Denver once they have MRI/CT scheduled, leaving at least 2-3 days for our office to receive their results.

## 2019-08-13 ENCOUNTER — OFFICE VISIT (OUTPATIENT)
Dept: ORTHOPEDIC SURGERY | Age: 79
End: 2019-08-13
Payer: MEDICARE

## 2019-08-13 DIAGNOSIS — S83.271A COMPLEX TEAR OF LATERAL MENISCUS OF RIGHT KNEE AS CURRENT INJURY, INITIAL ENCOUNTER: ICD-10-CM

## 2019-08-13 DIAGNOSIS — S83.231A COMPLEX TEAR OF MEDIAL MENISCUS OF RIGHT KNEE AS CURRENT INJURY, INITIAL ENCOUNTER: ICD-10-CM

## 2019-08-13 DIAGNOSIS — M22.41 CHONDROMALACIA OF RIGHT PATELLA: ICD-10-CM

## 2019-08-13 DIAGNOSIS — M17.11 PRIMARY OSTEOARTHRITIS OF RIGHT KNEE: Primary | ICD-10-CM

## 2019-08-13 PROCEDURE — 99214 OFFICE O/P EST MOD 30 MIN: CPT | Performed by: PHYSICIAN ASSISTANT

## 2019-08-13 NOTE — PROGRESS NOTES
Food insecurity:     Worry: None     Inability: None    Transportation needs:     Medical: None     Non-medical: None   Tobacco Use    Smoking status: Never Smoker    Smokeless tobacco: Never Used   Substance and Sexual Activity    Alcohol use: No    Drug use: No    Sexual activity: Not Currently   Lifestyle    Physical activity:     Days per week: None     Minutes per session: None    Stress: None   Relationships    Social connections:     Talks on phone: None     Gets together: None     Attends Congregation service: None     Active member of club or organization: None     Attends meetings of clubs or organizations: None     Relationship status: None    Intimate partner violence:     Fear of current or ex partner: None     Emotionally abused: None     Physically abused: None     Forced sexual activity: None   Other Topics Concern    None   Social History Narrative    None     Current Outpatient Medications   Medication Sig Dispense Refill    meloxicam (MOBIC) 15 MG tablet Take 1 tablet by mouth daily as needed for Pain 30 tablet 0    aspirin 325 MG tablet Take 1 tablet by mouth 2 times daily (with meals). Take 1 tablet after breakfast and 1 tablet after dinner 70 tablet 3    Coenzyme Q10 (CO Q 10) 100 MG CAPS Take 1 capsule by mouth 2 times daily.  B Complex Vitamins (VITAMIN-B COMPLEX PO) Take 1 tablet by mouth daily.  calcium carbonate (OSCAL) 500 MG TABS tablet Take 500 mg by mouth daily.  therapeutic multivitamin-minerals (THERAGRAN-M) tablet Take 1 tablet by mouth daily.  lisinopril (PRINIVIL;ZESTRIL) 10 MG tablet Take 10 mg by mouth daily.  simvastatin (ZOCOR) 20 MG tablet Take 20 mg by mouth nightly. No current facility-administered medications for this visit. Review of Systems:  Relevant review of systems reviewed and available in the patient's chart    Vital Signs: There were no vitals taken for this visit.     General Exam:   Constitutional: Patient is

## 2019-08-30 ENCOUNTER — TELEPHONE (OUTPATIENT)
Dept: ORTHOPEDIC SURGERY | Age: 79
End: 2019-08-30

## 2019-09-30 ENCOUNTER — HOSPITAL ENCOUNTER (OUTPATIENT)
Dept: ULTRASOUND IMAGING | Age: 79
Discharge: HOME | End: 2019-09-30
Payer: MEDICARE

## 2019-09-30 DIAGNOSIS — R94.4 ABNORMAL CREATININE CLEARANCE GLOMERULAR FILTRATION: ICD-10-CM

## 2019-09-30 PROCEDURE — 76770 US EXAM ABDO BACK WALL COMP: CPT

## 2019-10-15 ENCOUNTER — HOSPITAL ENCOUNTER (OUTPATIENT)
Age: 79
Discharge: HOME | End: 2019-10-15
Payer: MEDICARE

## 2019-10-15 ENCOUNTER — HOSPITAL ENCOUNTER (OUTPATIENT)
Dept: CT IMAGING | Age: 79
Discharge: HOME | End: 2019-10-15
Payer: MEDICARE

## 2019-10-15 DIAGNOSIS — D30.01 BENIGN NEOPLASM OF RIGHT KIDNEY: ICD-10-CM

## 2019-10-15 DIAGNOSIS — Q61.5 CONGENITAL MEDULLARY CYSTIC KIDNEY: ICD-10-CM

## 2019-10-15 LAB
CREAT SERPL-MCNC: 0.9 MG/DL (ref 0.6–1.2)
GFR AFRICAN AMERICAN: >60
GFR NON-AFRICAN AMERICAN: >60

## 2019-10-15 PROCEDURE — 82565 ASSAY OF CREATININE: CPT

## 2019-10-15 PROCEDURE — 36415 COLL VENOUS BLD VENIPUNCTURE: CPT

## 2019-10-15 PROCEDURE — 74170 CT ABD WO CNTRST FLWD CNTRST: CPT

## 2019-10-15 PROCEDURE — 6360000004 HC RX CONTRAST MEDICATION: Performed by: INTERNAL MEDICINE

## 2019-10-15 RX ADMIN — IOPAMIDOL 75 ML: 755 INJECTION, SOLUTION INTRAVENOUS at 06:37

## 2019-12-10 RX ORDER — GABAPENTIN 300 MG/1
300 CAPSULE ORAL DAILY
COMMUNITY

## 2019-12-11 ENCOUNTER — ANESTHESIA EVENT (OUTPATIENT)
Dept: OPERATING ROOM | Age: 79
End: 2019-12-11
Payer: MEDICARE

## 2019-12-12 ENCOUNTER — ANESTHESIA (OUTPATIENT)
Dept: OPERATING ROOM | Age: 79
End: 2019-12-12
Payer: MEDICARE

## 2019-12-12 ENCOUNTER — HOSPITAL ENCOUNTER (OUTPATIENT)
Age: 79
Setting detail: OUTPATIENT SURGERY
Discharge: HOME | End: 2019-12-12
Attending: OPHTHALMOLOGY | Admitting: OPHTHALMOLOGY
Payer: MEDICARE

## 2019-12-12 VITALS — DIASTOLIC BLOOD PRESSURE: 68 MMHG | OXYGEN SATURATION: 100 % | SYSTOLIC BLOOD PRESSURE: 125 MMHG

## 2019-12-12 VITALS
DIASTOLIC BLOOD PRESSURE: 77 MMHG | WEIGHT: 142 LBS | OXYGEN SATURATION: 96 % | RESPIRATION RATE: 16 BRPM | BODY MASS INDEX: 22.82 KG/M2 | TEMPERATURE: 98.4 F | SYSTOLIC BLOOD PRESSURE: 162 MMHG | HEART RATE: 60 BPM | HEIGHT: 66 IN

## 2019-12-12 PROCEDURE — 3700000001 HC ADD 15 MINUTES (ANESTHESIA): Performed by: OPHTHALMOLOGY

## 2019-12-12 PROCEDURE — 6360000002 HC RX W HCPCS: Performed by: NURSE ANESTHETIST, CERTIFIED REGISTERED

## 2019-12-12 PROCEDURE — 2500000003 HC RX 250 WO HCPCS: Performed by: NURSE ANESTHETIST, CERTIFIED REGISTERED

## 2019-12-12 PROCEDURE — 7100000011 HC PHASE II RECOVERY - ADDTL 15 MIN: Performed by: OPHTHALMOLOGY

## 2019-12-12 PROCEDURE — 7100000010 HC PHASE II RECOVERY - FIRST 15 MIN: Performed by: OPHTHALMOLOGY

## 2019-12-12 PROCEDURE — 6360000002 HC RX W HCPCS: Performed by: OPHTHALMOLOGY

## 2019-12-12 PROCEDURE — 2500000003 HC RX 250 WO HCPCS: Performed by: OPHTHALMOLOGY

## 2019-12-12 PROCEDURE — 6370000000 HC RX 637 (ALT 250 FOR IP): Performed by: OPHTHALMOLOGY

## 2019-12-12 PROCEDURE — 3600000013 HC SURGERY LEVEL 3 ADDTL 15MIN: Performed by: OPHTHALMOLOGY

## 2019-12-12 PROCEDURE — 2580000003 HC RX 258: Performed by: ANESTHESIOLOGY

## 2019-12-12 PROCEDURE — V2632 POST CHMBR INTRAOCULAR LENS: HCPCS | Performed by: OPHTHALMOLOGY

## 2019-12-12 PROCEDURE — 3600000003 HC SURGERY LEVEL 3 BASE: Performed by: OPHTHALMOLOGY

## 2019-12-12 PROCEDURE — 2709999900 HC NON-CHARGEABLE SUPPLY: Performed by: OPHTHALMOLOGY

## 2019-12-12 PROCEDURE — 3700000000 HC ANESTHESIA ATTENDED CARE: Performed by: OPHTHALMOLOGY

## 2019-12-12 PROCEDURE — 2580000003 HC RX 258: Performed by: NURSE ANESTHETIST, CERTIFIED REGISTERED

## 2019-12-12 PROCEDURE — 2500000003 HC RX 250 WO HCPCS: Performed by: ANESTHESIOLOGY

## 2019-12-12 DEVICE — ACRYSOF(R) IQ ASPHERIC IOL SP ACRYLIC FOLDABLELENS WULTRASERT(TM) DELIVERY SYSTEM UV WBLUE LIGHT FILTER. 13.0MM LENGTH 6.0MM ANTERIORASYMMETRIC BICONVEX OPTIC PLANAR HAPTICS.
Type: IMPLANTABLE DEVICE | Site: EYE | Status: FUNCTIONAL
Brand: ACRYSOF ULTRASERT

## 2019-12-12 RX ORDER — SODIUM CHLORIDE, POTASSIUM CHLORIDE, CALCIUM CHLORIDE, MAGNESIUM CHLORIDE, SODIUM ACETATE, AND SODIUM CITRATE 6.4; .75; .48; .3; 3.9; 1.7 MG/ML; MG/ML; MG/ML; MG/ML; MG/ML; MG/ML
SOLUTION IRRIGATION PRN
Status: DISCONTINUED | OUTPATIENT
Start: 2019-12-12 | End: 2019-12-12 | Stop reason: ALTCHOICE

## 2019-12-12 RX ORDER — PREDNISOLONE ACETATE 10 MG/ML
1 SUSPENSION/ DROPS OPHTHALMIC SEE ADMIN INSTRUCTIONS
Status: DISCONTINUED | OUTPATIENT
Start: 2019-12-12 | End: 2019-12-12 | Stop reason: HOSPADM

## 2019-12-12 RX ORDER — LIDOCAINE HYDROCHLORIDE 10 MG/ML
INJECTION, SOLUTION INFILTRATION; PERINEURAL PRN
Status: DISCONTINUED | OUTPATIENT
Start: 2019-12-12 | End: 2019-12-12 | Stop reason: SDUPTHER

## 2019-12-12 RX ORDER — SODIUM CHLORIDE 0.9 % (FLUSH) 0.9 %
10 SYRINGE (ML) INJECTION EVERY 12 HOURS SCHEDULED
Status: DISCONTINUED | OUTPATIENT
Start: 2019-12-12 | End: 2019-12-12 | Stop reason: HOSPADM

## 2019-12-12 RX ORDER — PREDNISOLONE ACETATE 10 MG/ML
SUSPENSION/ DROPS OPHTHALMIC PRN
Status: DISCONTINUED | OUTPATIENT
Start: 2019-12-12 | End: 2019-12-12 | Stop reason: ALTCHOICE

## 2019-12-12 RX ORDER — PROPOFOL 10 MG/ML
INJECTION, EMULSION INTRAVENOUS PRN
Status: DISCONTINUED | OUTPATIENT
Start: 2019-12-12 | End: 2019-12-12 | Stop reason: SDUPTHER

## 2019-12-12 RX ORDER — SODIUM CHLORIDE, SODIUM LACTATE, POTASSIUM CHLORIDE, CALCIUM CHLORIDE 600; 310; 30; 20 MG/100ML; MG/100ML; MG/100ML; MG/100ML
INJECTION, SOLUTION INTRAVENOUS CONTINUOUS PRN
Status: DISCONTINUED | OUTPATIENT
Start: 2019-12-12 | End: 2019-12-12 | Stop reason: SDUPTHER

## 2019-12-12 RX ORDER — TOBRAMYCIN AND DEXAMETHASONE 3; 1 MG/ML; MG/ML
1 SUSPENSION/ DROPS OPHTHALMIC SEE ADMIN INSTRUCTIONS
Status: DISCONTINUED | OUTPATIENT
Start: 2019-12-12 | End: 2019-12-12 | Stop reason: HOSPADM

## 2019-12-12 RX ORDER — SODIUM CHLORIDE, SODIUM LACTATE, POTASSIUM CHLORIDE, CALCIUM CHLORIDE 600; 310; 30; 20 MG/100ML; MG/100ML; MG/100ML; MG/100ML
INJECTION, SOLUTION INTRAVENOUS CONTINUOUS
Status: DISCONTINUED | OUTPATIENT
Start: 2019-12-12 | End: 2019-12-12 | Stop reason: HOSPADM

## 2019-12-12 RX ORDER — TETRACAINE HYDROCHLORIDE 5 MG/ML
SOLUTION OPHTHALMIC PRN
Status: DISCONTINUED | OUTPATIENT
Start: 2019-12-12 | End: 2019-12-12 | Stop reason: ALTCHOICE

## 2019-12-12 RX ORDER — SODIUM CHLORIDE 0.9 % (FLUSH) 0.9 %
10 SYRINGE (ML) INJECTION PRN
Status: DISCONTINUED | OUTPATIENT
Start: 2019-12-12 | End: 2019-12-12 | Stop reason: HOSPADM

## 2019-12-12 RX ORDER — LIDOCAINE HYDROCHLORIDE 10 MG/ML
1 INJECTION, SOLUTION EPIDURAL; INFILTRATION; INTRACAUDAL; PERINEURAL
Status: COMPLETED | OUTPATIENT
Start: 2019-12-12 | End: 2019-12-12

## 2019-12-12 RX ORDER — PILOCARPINE HYDROCHLORIDE 20 MG/ML
SOLUTION/ DROPS OPHTHALMIC PRN
Status: DISCONTINUED | OUTPATIENT
Start: 2019-12-12 | End: 2019-12-12 | Stop reason: ALTCHOICE

## 2019-12-12 RX ORDER — TOBRAMYCIN AND DEXAMETHASONE 3; 1 MG/ML; MG/ML
SUSPENSION/ DROPS OPHTHALMIC PRN
Status: DISCONTINUED | OUTPATIENT
Start: 2019-12-12 | End: 2019-12-12 | Stop reason: ALTCHOICE

## 2019-12-12 RX ADMIN — LIDOCAINE HYDROCHLORIDE 40 MG: 10 INJECTION, SOLUTION INFILTRATION; PERINEURAL at 10:54

## 2019-12-12 RX ADMIN — LIDOCAINE HYDROCHLORIDE 0.1 ML: 10 INJECTION, SOLUTION EPIDURAL; INFILTRATION; INTRACAUDAL; PERINEURAL at 10:00

## 2019-12-12 RX ADMIN — Medication 0.3 ML: at 09:47

## 2019-12-12 RX ADMIN — PROPOFOL 60 MG: 10 INJECTION, EMULSION INTRAVENOUS at 10:54

## 2019-12-12 RX ADMIN — SODIUM CHLORIDE, POTASSIUM CHLORIDE, SODIUM LACTATE AND CALCIUM CHLORIDE: 600; 310; 30; 20 INJECTION, SOLUTION INTRAVENOUS at 10:00

## 2019-12-12 RX ADMIN — BROMFENAC SODIUM 1 DROP: 0.7 SOLUTION/ DROPS OPHTHALMIC at 09:47

## 2019-12-12 RX ADMIN — Medication 0.3 ML: at 10:01

## 2019-12-12 RX ADMIN — Medication 0.3 ML: at 10:21

## 2019-12-12 RX ADMIN — SODIUM CHLORIDE, POTASSIUM CHLORIDE, SODIUM LACTATE AND CALCIUM CHLORIDE: 600; 310; 30; 20 INJECTION, SOLUTION INTRAVENOUS at 09:43

## 2019-12-12 ASSESSMENT — PULMONARY FUNCTION TESTS
PIF_VALUE: 0
PIF_VALUE: 1
PIF_VALUE: 0
PIF_VALUE: 0
PIF_VALUE: 1
PIF_VALUE: 0
PIF_VALUE: 1
PIF_VALUE: 0
PIF_VALUE: 1
PIF_VALUE: 0
PIF_VALUE: 1
PIF_VALUE: 0

## 2019-12-12 ASSESSMENT — PAIN SCALES - GENERAL
PAINLEVEL_OUTOF10: 0
PAINLEVEL_OUTOF10: 0

## 2019-12-12 ASSESSMENT — PAIN - FUNCTIONAL ASSESSMENT: PAIN_FUNCTIONAL_ASSESSMENT: 0-10

## 2020-07-02 ENCOUNTER — HOSPITAL ENCOUNTER (OUTPATIENT)
Age: 80
Discharge: HOME | End: 2020-07-02
Payer: MEDICARE

## 2020-07-02 LAB
EKG ATRIAL RATE: 63 BPM
EKG DIAGNOSIS: NORMAL
EKG P AXIS: 73 DEGREES
EKG P-R INTERVAL: 140 MS
EKG Q-T INTERVAL: 416 MS
EKG QRS DURATION: 76 MS
EKG QTC CALCULATION (BAZETT): 425 MS
EKG R AXIS: 62 DEGREES
EKG T AXIS: 73 DEGREES
EKG VENTRICULAR RATE: 63 BPM

## 2020-07-02 PROCEDURE — 93005 ELECTROCARDIOGRAM TRACING: CPT | Performed by: INTERNAL MEDICINE

## 2020-07-02 PROCEDURE — 93010 ELECTROCARDIOGRAM REPORT: CPT | Performed by: INTERNAL MEDICINE

## 2020-12-01 ENCOUNTER — APPOINTMENT (OUTPATIENT)
Dept: GENERAL RADIOLOGY | Age: 80
DRG: 177 | End: 2020-12-01
Payer: MEDICARE

## 2020-12-01 ENCOUNTER — HOSPITAL ENCOUNTER (INPATIENT)
Age: 80
LOS: 1 days | Discharge: HOME | DRG: 177 | End: 2020-12-02
Attending: INTERNAL MEDICINE | Admitting: INTERNAL MEDICINE
Payer: MEDICARE

## 2020-12-01 PROBLEM — J18.9 PNEUMONIA: Status: ACTIVE | Noted: 2020-12-01

## 2020-12-01 LAB
A/G RATIO: 1.1 (ref 1.1–2.2)
ABO/RH: NORMAL
ALBUMIN SERPL-MCNC: 3.6 G/DL (ref 3.4–5)
ALP BLD-CCNC: 57 U/L (ref 40–129)
ALT SERPL-CCNC: 25 U/L (ref 10–40)
ANION GAP SERPL CALCULATED.3IONS-SCNC: 12 MMOL/L (ref 3–16)
ANTIBODY SCREEN: NORMAL
AST SERPL-CCNC: 36 U/L (ref 15–37)
BASE EXCESS VENOUS: 1.8 MMOL/L (ref -3–3)
BASOPHILS ABSOLUTE: 0 K/UL (ref 0–0.2)
BASOPHILS ABSOLUTE: 0 K/UL (ref 0–0.2)
BASOPHILS RELATIVE PERCENT: 0.7 %
BASOPHILS RELATIVE PERCENT: 0.7 %
BILIRUB SERPL-MCNC: 0.4 MG/DL (ref 0–1)
BUN BLDV-MCNC: 21 MG/DL (ref 7–20)
CALCIUM SERPL-MCNC: 8.7 MG/DL (ref 8.3–10.6)
CARBOXYHEMOGLOBIN: 1.8 % (ref 0–1.5)
CHLORIDE BLD-SCNC: 99 MMOL/L (ref 99–110)
CO2: 24 MMOL/L (ref 21–32)
CREAT SERPL-MCNC: 1 MG/DL (ref 0.6–1.2)
D DIMER: 278 NG/ML DDU (ref 0–229)
EOSINOPHILS ABSOLUTE: 0 K/UL (ref 0–0.6)
EOSINOPHILS ABSOLUTE: 0 K/UL (ref 0–0.6)
EOSINOPHILS RELATIVE PERCENT: 0.1 %
EOSINOPHILS RELATIVE PERCENT: 0.1 %
FIBRINOGEN: 408 MG/DL (ref 200–397)
GFR AFRICAN AMERICAN: >60
GFR NON-AFRICAN AMERICAN: 53
GLOBULIN: 3.2 G/DL
GLUCOSE BLD-MCNC: 125 MG/DL (ref 70–99)
HCO3 VENOUS: 24.9 MMOL/L (ref 23–29)
HCT VFR BLD CALC: 33.9 % (ref 36–48)
HCT VFR BLD CALC: 35.5 % (ref 36–48)
HEMOGLOBIN: 11.7 G/DL (ref 12–16)
HEMOGLOBIN: 12.1 G/DL (ref 12–16)
INR BLD: 0.98 (ref 0.86–1.14)
INR BLD: 1.02 (ref 0.86–1.14)
LYMPHOCYTES ABSOLUTE: 0.7 K/UL (ref 1–5.1)
LYMPHOCYTES ABSOLUTE: 0.7 K/UL (ref 1–5.1)
LYMPHOCYTES RELATIVE PERCENT: 11.9 %
LYMPHOCYTES RELATIVE PERCENT: 12.1 %
MCH RBC QN AUTO: 31.3 PG (ref 26–34)
MCH RBC QN AUTO: 31.4 PG (ref 26–34)
MCHC RBC AUTO-ENTMCNC: 34.2 G/DL (ref 31–36)
MCHC RBC AUTO-ENTMCNC: 34.5 G/DL (ref 31–36)
MCV RBC AUTO: 91 FL (ref 80–100)
MCV RBC AUTO: 91.5 FL (ref 80–100)
METHEMOGLOBIN VENOUS: 0.3 %
MONOCYTES ABSOLUTE: 0.5 K/UL (ref 0–1.3)
MONOCYTES ABSOLUTE: 0.6 K/UL (ref 0–1.3)
MONOCYTES RELATIVE PERCENT: 9.1 %
MONOCYTES RELATIVE PERCENT: 9.2 %
NEUTROPHILS ABSOLUTE: 4.5 K/UL (ref 1.7–7.7)
NEUTROPHILS ABSOLUTE: 4.8 K/UL (ref 1.7–7.7)
NEUTROPHILS RELATIVE PERCENT: 77.9 %
NEUTROPHILS RELATIVE PERCENT: 78.2 %
O2 CONTENT, VEN: 16 VOL %
O2 SAT, VEN: 88 %
O2 THERAPY: ABNORMAL
PCO2, VEN: 33.9 MMHG (ref 40–50)
PDW BLD-RTO: 12.3 % (ref 12.4–15.4)
PDW BLD-RTO: 12.6 % (ref 12.4–15.4)
PH VENOUS: 7.48 (ref 7.35–7.45)
PLATELET # BLD: 227 K/UL (ref 135–450)
PLATELET # BLD: 227 K/UL (ref 135–450)
PMV BLD AUTO: 6.7 FL (ref 5–10.5)
PMV BLD AUTO: 6.8 FL (ref 5–10.5)
PO2, VEN: 49.4 MMHG (ref 25–40)
POTASSIUM REFLEX MAGNESIUM: 4.3 MMOL/L (ref 3.5–5.1)
PRO-BNP: 127 PG/ML (ref 0–449)
PROTHROMBIN TIME: 11.4 SEC (ref 10–13.2)
PROTHROMBIN TIME: 11.8 SEC (ref 10–13.2)
RAPID INFLUENZA  B AGN: NEGATIVE
RAPID INFLUENZA A AGN: NEGATIVE
RBC # BLD: 3.73 M/UL (ref 4–5.2)
RBC # BLD: 3.88 M/UL (ref 4–5.2)
SARS-COV-2, NAAT: DETECTED
SODIUM BLD-SCNC: 135 MMOL/L (ref 136–145)
TCO2 CALC VENOUS: 26 MMOL/L
TOTAL PROTEIN: 6.8 G/DL (ref 6.4–8.2)
TROPONIN: <0.01 NG/ML
WBC # BLD: 5.8 K/UL (ref 4–11)
WBC # BLD: 6.1 K/UL (ref 4–11)

## 2020-12-01 PROCEDURE — 36415 COLL VENOUS BLD VENIPUNCTURE: CPT

## 2020-12-01 PROCEDURE — 96365 THER/PROPH/DIAG IV INF INIT: CPT

## 2020-12-01 PROCEDURE — 84145 PROCALCITONIN (PCT): CPT

## 2020-12-01 PROCEDURE — 86850 RBC ANTIBODY SCREEN: CPT

## 2020-12-01 PROCEDURE — 85610 PROTHROMBIN TIME: CPT

## 2020-12-01 PROCEDURE — 99283 EMERGENCY DEPT VISIT LOW MDM: CPT

## 2020-12-01 PROCEDURE — 84484 ASSAY OF TROPONIN QUANT: CPT

## 2020-12-01 PROCEDURE — 71045 X-RAY EXAM CHEST 1 VIEW: CPT

## 2020-12-01 PROCEDURE — 96375 TX/PRO/DX INJ NEW DRUG ADDON: CPT

## 2020-12-01 PROCEDURE — U0003 INFECTIOUS AGENT DETECTION BY NUCLEIC ACID (DNA OR RNA); SEVERE ACUTE RESPIRATORY SYNDROME CORONAVIRUS 2 (SARS-COV-2) (CORONAVIRUS DISEASE [COVID-19]), AMPLIFIED PROBE TECHNIQUE, MAKING USE OF HIGH THROUGHPUT TECHNOLOGIES AS DESCRIBED BY CMS-2020-01-R: HCPCS

## 2020-12-01 PROCEDURE — 85379 FIBRIN DEGRADATION QUANT: CPT

## 2020-12-01 PROCEDURE — 6360000002 HC RX W HCPCS: Performed by: INTERNAL MEDICINE

## 2020-12-01 PROCEDURE — 1200000000 HC SEMI PRIVATE

## 2020-12-01 PROCEDURE — G0378 HOSPITAL OBSERVATION PER HR: HCPCS

## 2020-12-01 PROCEDURE — 2580000003 HC RX 258: Performed by: STUDENT IN AN ORGANIZED HEALTH CARE EDUCATION/TRAINING PROGRAM

## 2020-12-01 PROCEDURE — 83880 ASSAY OF NATRIURETIC PEPTIDE: CPT

## 2020-12-01 PROCEDURE — 2580000003 HC RX 258: Performed by: INTERNAL MEDICINE

## 2020-12-01 PROCEDURE — 6360000002 HC RX W HCPCS: Performed by: STUDENT IN AN ORGANIZED HEALTH CARE EDUCATION/TRAINING PROGRAM

## 2020-12-01 PROCEDURE — 82803 BLOOD GASES ANY COMBINATION: CPT

## 2020-12-01 PROCEDURE — 6370000000 HC RX 637 (ALT 250 FOR IP): Performed by: INTERNAL MEDICINE

## 2020-12-01 PROCEDURE — 87804 INFLUENZA ASSAY W/OPTIC: CPT

## 2020-12-01 PROCEDURE — U0002 COVID-19 LAB TEST NON-CDC: HCPCS

## 2020-12-01 PROCEDURE — 85025 COMPLETE CBC W/AUTO DIFF WBC: CPT

## 2020-12-01 PROCEDURE — 2580000003 HC RX 258

## 2020-12-01 PROCEDURE — 93005 ELECTROCARDIOGRAM TRACING: CPT | Performed by: STUDENT IN AN ORGANIZED HEALTH CARE EDUCATION/TRAINING PROGRAM

## 2020-12-01 PROCEDURE — 80053 COMPREHEN METABOLIC PANEL: CPT

## 2020-12-01 PROCEDURE — 6370000000 HC RX 637 (ALT 250 FOR IP): Performed by: STUDENT IN AN ORGANIZED HEALTH CARE EDUCATION/TRAINING PROGRAM

## 2020-12-01 PROCEDURE — 82306 VITAMIN D 25 HYDROXY: CPT

## 2020-12-01 PROCEDURE — 86900 BLOOD TYPING SEROLOGIC ABO: CPT

## 2020-12-01 PROCEDURE — 85384 FIBRINOGEN ACTIVITY: CPT

## 2020-12-01 PROCEDURE — 86901 BLOOD TYPING SEROLOGIC RH(D): CPT

## 2020-12-01 RX ORDER — AZITHROMYCIN 250 MG/1
500 TABLET, FILM COATED ORAL ONCE
Status: COMPLETED | OUTPATIENT
Start: 2020-12-01 | End: 2020-12-01

## 2020-12-01 RX ORDER — LISINOPRIL 10 MG/1
10 TABLET ORAL DAILY
Status: DISCONTINUED | OUTPATIENT
Start: 2020-12-02 | End: 2020-12-02

## 2020-12-01 RX ORDER — BENZONATATE 100 MG/1
100 CAPSULE ORAL 3 TIMES DAILY PRN
Status: DISCONTINUED | OUTPATIENT
Start: 2020-12-01 | End: 2020-12-02 | Stop reason: HOSPADM

## 2020-12-01 RX ORDER — ONDANSETRON 2 MG/ML
4 INJECTION INTRAMUSCULAR; INTRAVENOUS EVERY 6 HOURS PRN
Status: DISCONTINUED | OUTPATIENT
Start: 2020-12-01 | End: 2020-12-01

## 2020-12-01 RX ORDER — ATORVASTATIN CALCIUM 40 MG/1
40 TABLET, FILM COATED ORAL DAILY
COMMUNITY
Start: 2020-11-16

## 2020-12-01 RX ORDER — ACETAMINOPHEN 325 MG/1
650 TABLET ORAL EVERY 6 HOURS PRN
Status: DISCONTINUED | OUTPATIENT
Start: 2020-12-01 | End: 2020-12-02 | Stop reason: HOSPADM

## 2020-12-01 RX ORDER — SODIUM CHLORIDE 0.9 % (FLUSH) 0.9 %
10 SYRINGE (ML) INJECTION PRN
Status: DISCONTINUED | OUTPATIENT
Start: 2020-12-01 | End: 2020-12-02 | Stop reason: HOSPADM

## 2020-12-01 RX ORDER — GUAIFENESIN/DEXTROMETHORPHAN 100-10MG/5
5 SYRUP ORAL EVERY 4 HOURS PRN
Status: DISCONTINUED | OUTPATIENT
Start: 2020-12-01 | End: 2020-12-02 | Stop reason: HOSPADM

## 2020-12-01 RX ORDER — LEVOFLOXACIN 5 MG/ML
500 INJECTION, SOLUTION INTRAVENOUS EVERY 24 HOURS
Status: DISCONTINUED | OUTPATIENT
Start: 2020-12-01 | End: 2020-12-02

## 2020-12-01 RX ORDER — POLYETHYLENE GLYCOL 3350 17 G/17G
17 POWDER, FOR SOLUTION ORAL DAILY PRN
Status: DISCONTINUED | OUTPATIENT
Start: 2020-12-01 | End: 2020-12-02 | Stop reason: HOSPADM

## 2020-12-01 RX ORDER — PROMETHAZINE HYDROCHLORIDE 25 MG/1
12.5 TABLET ORAL EVERY 6 HOURS PRN
Status: DISCONTINUED | OUTPATIENT
Start: 2020-12-01 | End: 2020-12-01

## 2020-12-01 RX ORDER — SODIUM CHLORIDE 9 MG/ML
INJECTION, SOLUTION INTRAVENOUS
Status: COMPLETED
Start: 2020-12-01 | End: 2020-12-01

## 2020-12-01 RX ORDER — SODIUM CHLORIDE 0.9 % (FLUSH) 0.9 %
10 SYRINGE (ML) INJECTION EVERY 12 HOURS SCHEDULED
Status: DISCONTINUED | OUTPATIENT
Start: 2020-12-01 | End: 2020-12-02 | Stop reason: HOSPADM

## 2020-12-01 RX ORDER — ACETAMINOPHEN 650 MG/1
650 SUPPOSITORY RECTAL EVERY 6 HOURS PRN
Status: DISCONTINUED | OUTPATIENT
Start: 2020-12-01 | End: 2020-12-02 | Stop reason: HOSPADM

## 2020-12-01 RX ORDER — VITAMIN B COMPLEX
2000 TABLET ORAL DAILY
Status: DISCONTINUED | OUTPATIENT
Start: 2020-12-02 | End: 2020-12-02 | Stop reason: HOSPADM

## 2020-12-01 RX ORDER — AMOXICILLIN AND CLAVULANATE POTASSIUM 875; 125 MG/1; MG/1
1 TABLET, FILM COATED ORAL 2 TIMES DAILY
Status: ON HOLD | COMMUNITY
Start: 2020-11-30 | End: 2020-12-02 | Stop reason: HOSPADM

## 2020-12-01 RX ORDER — GUAIFENESIN 600 MG/1
600 TABLET, EXTENDED RELEASE ORAL 2 TIMES DAILY
Status: DISCONTINUED | OUTPATIENT
Start: 2020-12-01 | End: 2020-12-02 | Stop reason: HOSPADM

## 2020-12-01 RX ORDER — 0.9 % SODIUM CHLORIDE 0.9 %
500 INTRAVENOUS SOLUTION INTRAVENOUS ONCE
Status: COMPLETED | OUTPATIENT
Start: 2020-12-01 | End: 2020-12-01

## 2020-12-01 RX ADMIN — CEFTRIAXONE SODIUM 1 G: 1 INJECTION, POWDER, FOR SOLUTION INTRAMUSCULAR; INTRAVENOUS at 20:00

## 2020-12-01 RX ADMIN — SODIUM CHLORIDE 1000 ML: 9 INJECTION, SOLUTION INTRAVENOUS at 22:46

## 2020-12-01 RX ADMIN — Medication 10 ML: at 22:45

## 2020-12-01 RX ADMIN — LEVOFLOXACIN 500 MG: 5 INJECTION, SOLUTION INTRAVENOUS at 22:46

## 2020-12-01 RX ADMIN — AZITHROMYCIN 500 MG: 250 TABLET, FILM COATED ORAL at 18:55

## 2020-12-01 RX ADMIN — DEXAMETHASONE 6 MG: 4 TABLET ORAL at 22:45

## 2020-12-01 RX ADMIN — GUAIFENESIN 600 MG: 600 TABLET, EXTENDED RELEASE ORAL at 22:45

## 2020-12-01 RX ADMIN — SODIUM CHLORIDE 500 ML: 9 INJECTION, SOLUTION INTRAVENOUS at 18:56

## 2020-12-01 NOTE — LETTER
Adriana Izquierdo  1940   AGREEMENT FOR SELF-ISOLATION FOR COVID-19       Since you have been tested for SARS-CoV-2 or Coronavirus Disease (COVID 19) you are required consistent with the recommendations of the Centers for Disease Control & Prevention (CDC)  to self-isolate until receiving confirmation of a negative test or for 14 days after the first onset of symptoms. The time period for self-isolation will be specified at the time of collecting the nasopharyngeal test sample. To prevent possible transmission of COVID 19 to others, I agree to follow the measures described below until notified by by my provider or [facility] ([facility]) or [employer]. I understand these measures based upon recommendations of the CDC are necessary to minimize the exposure of others to COVID 19.    ? I agree to remain in my residence. I agree not to leave my residence unless it is absolutely necessary (such as in an emergency). If I must leave my home for any reason or develop symptoms consistent with COVID 19, I agree to notify my local health department as soon as possible. ? I agree to not have visitors in my residence. ? I understand that the mask will reduce the exposure of others to COVID 19.    ? I agree to wear a mask and utilize additional precautions, as instructed by [facility] ([facility]) or my provider, if I must leave my home for a doctor appointment with my physician or any clinic during the 14-day isolation period (or negative test result). ? I agree to not utilize public transportation for the 14-day isolation period. I understand additional information is available on what to do if you are sick at [email or web address]      Contact Information for [address] [place] [address] Street of Patient Residence:            Signature of Responsible Party  Relationship to Patient  Date                 Witness

## 2020-12-01 NOTE — ED PROVIDER NOTES
[facility] EMERGENCY DEPARTMENT      CHIEF COMPLAINT  Cough (patient states she has had a fever, cough, and tired for the last week. patient denies sob, cp, NVD, or headache. )       HISTORY OF PRESENT ILLNESS  Adriana Izquierdo is a [de-identified] y.o. female with a past medical history of remote breast cancer, hyperlipidemia, and hypertension, who presents to the ED complaining of cough, fever, fatigue, and diarrhea. Reports that symptoms have been present for the past week. She reports that she feels that her symptoms are getting worse. She reports chills, she has not measured her temperature at home. She reports cough productive of white sputum. She reports increased fatigue. She denies chest pain or shortness of breath. She reports significant diarrhea and one episode of nonbloody nonbilious emesis. She denies headache. Patient denies previous blood clot or active malignancy, leg swelling, hemoptysis, recent travel or surgery/prolonged immobilization, or OCP or other hormone use. Patient does not smoke. No other complaints, modifying factors or associated symptoms. I have reviewed the following from the nursing documentation. Past Medical History:   Diagnosis Date    Anesthesia complication     \" thrashed about\"    Cancer ([facility] [address] breast    Hyperlipidemia     Hypertension     S/P chemotherapy, time since greater than 12 weeks     Wears dentures      Past Surgical History:   Procedure Laterality Date    BREAST SURGERY Left     mastectomy    BREAST SURGERY Right     implant    CHOLECYSTECTOMY      COLONOSCOPY      HEMORRHOID SURGERY      HYSTERECTOMY      INTRACAPSULAR CATARACT EXTRACTION Left 12/12/2019    PHACO EMULSIFICATION OF CATARACT WITH INTRAOCULAR LENS IMPLANT LEFT EYE performed by Yuridia Jackman MD at [address] [place] [address] Drive Left     TOTAL KNEE ARTHROPLASTY Left 11/8/2013     History reviewed. No pertinent family history.   Social History     Socioeconomic History    Marital daily.      calcium carbonate (OSCAL) 500 MG TABS tablet Take 500 mg by mouth daily.  therapeutic multivitamin-minerals (THERAGRAN-M) tablet Take 1 tablet by mouth daily.  lisinopril (PRINIVIL;ZESTRIL) 10 MG tablet Take 10 mg by mouth daily. Facility-Administered Medications Ordered in Other Encounters   Medication Dose Route Frequency Provider Last Rate Last Dose    lidocaine PF 2 % in hylenex   Intraocular Once Korina Alaniz MD        epinephrine and lidocaine 2% PF in BSS injection (1 mL)   Intraocular Once Korina Alaniz MD         Allergies   Allergen Reactions    Promethazine Hcl     Zofran [Ondansetron Hcl]     Ondansetron Hcl     Morphine      \"itching\"  \"itching\"         REVIEW OF SYSTEMS  10 systems reviewed, pertinent positives per HPI otherwise noted to be negative. PHYSICAL EXAM  /65   Pulse 95   Temp 99.5 °F (37.5 °C)   Resp 18   Ht 5' 6\" (1.676 m)   Wt 138 lb (62.6 kg)   SpO2 95%   BMI 22.27 kg/m²    GENERAL APPEARANCE: Awake and alert. Cooperative. no distress. HENT: Normocephalic. Atraumatic. Mucous membranes are moist  NECK: Supple. Full range of motion of the neck without stiffness or pain. EYES: PERRL. EOM's grossly intact. HEART/CHEST: RRR. No murmurs. Chest wall is not tender to palpation. LUNGS: Respirations unlabored. Diminished breath sounds bilaterally. Does appear mildly short of breath when talking in full sentences. ABDOMEN: No tenderness. Soft. Non-distended. No masses. No organomegaly. No guarding or rebound. MUSCULOSKELETAL: No extremity edema. Compartments soft. No deformity. No tenderness in the extremities. All extremities neurovascularly intact. SKIN: Warm and dry. No acute rashes. NEUROLOGICAL: Alert and oriented. No gross facial drooping. Strength 5/5, sensation intact. PSYCHIATRIC: Normal mood and affect. LABS  I have reviewed all labs for this visit.    Results for orders placed or performed during the hospital encounter of 12/01/20   CBC Auto Differential   Result Value Ref Range    WBC 6.1 4.0 - 11.0 K/uL    RBC 3.88 (L) 4.00 - 5.20 M/uL    Hemoglobin 12.1 12.0 - 16.0 g/dL    Hematocrit 35.5 (L) 36.0 - 48.0 %    MCV 91.5 80.0 - 100.0 fL    MCH 31.3 26.0 - 34.0 pg    MCHC 34.2 31.0 - 36.0 g/dL    RDW 12.6 12.4 - 15.4 %    Platelets 752 354 - 661 K/uL    MPV 6.7 5.0 - 10.5 fL    Neutrophils % 78.2 %    Lymphocytes % 11.9 %    Monocytes % 9.1 %    Eosinophils % 0.1 %    Basophils % 0.7 %    Neutrophils Absolute 4.8 1.7 - 7.7 K/uL    Lymphocytes Absolute 0.7 (L) 1.0 - 5.1 K/uL    Monocytes Absolute 0.6 0.0 - 1.3 K/uL    Eosinophils Absolute 0.0 0.0 - 0.6 K/uL    Basophils Absolute 0.0 0.0 - 0.2 K/uL   Comprehensive Metabolic Panel w/ Reflex to MG   Result Value Ref Range    Sodium 135 (L) 136 - 145 mmol/L    Potassium reflex Magnesium 4.3 3.5 - 5.1 mmol/L    Chloride 99 99 - 110 mmol/L    CO2 24 21 - 32 mmol/L    Anion Gap 12 3 - 16    Glucose 125 (H) 70 - 99 mg/dL    BUN 21 (H) 7 - 20 mg/dL    CREATININE 1.0 0.6 - 1.2 mg/dL    GFR Non- 53 (A) >60    GFR African American >60 >60    Calcium 8.7 8.3 - 10.6 mg/dL    Total Protein 6.8 6.4 - 8.2 g/dL    Alb 3.6 3.4 - 5.0 g/dL    Albumin/Globulin Ratio 1.1 1.1 - 2.2    Total Bilirubin 0.4 0.0 - 1.0 mg/dL    Alkaline Phosphatase 57 40 - 129 U/L    ALT 25 10 - 40 U/L    AST 36 15 - 37 U/L    Globulin 3.2 g/dL   Troponin   Result Value Ref Range    Troponin <0.01 <0.01 ng/mL   Brain Natriuretic Peptide   Result Value Ref Range    Pro- 0 - 449 pg/mL   Blood Gas, Venous   Result Value Ref Range    pH, Al 7.483 (H) 7.350 - 7.450    pCO2, Al 33.9 (L) 40.0 - 50.0 mmHg    pO2, Al 49.4 (H) 25.0 - 40.0 mmHg    HCO3, Venous 24.9 23.0 - 29.0 mmol/L    Base Excess, Al 1.8 -3.0 - 3.0 mmol/L    O2 Sat, Al 88 Not Established %    Carboxyhemoglobin 1.8 (H) 0.0 - 1.5 %    MetHgb, Al 0.3 <1.5 %    TC02 (Calc), Al 26 Not Established mmol/L    O2 Content, Al 16 Not ACS, CHF exacerbation, COPD exacerbation, asthma, pulmonary embolism, arrhythmia, anemia    EKG, laboratory studies, and chest x-ray obtained. ED Course as of Dec 01 1936   Tue Dec 01, 2020   1860 No significant electrolyte abnormalities. [ER]   1837 Mild evidence of kidney injury. [ER]   1837 Liver function testing unremarkable. [ER]   1838 No leukocytosis, anemia, or thrombocytopenia. [ER]   7771 Patient shows mild alkalemia, no hypercarbia. [ER]   1838 Coagulation studies within normal limits. [ER]   1838 BNP within normal limits, no evidence of fluid overload on exam.    [ER]   1838 Troponin within normal limits. Patient is not having any chest pain. EKG  shows no evidence of acute ischemia. [ER]   1840 CXR: FINDINGS:  There are patchy opacities within the right perihilar region as well as in  the periphery of the right upper and right lower lung zones. The left lung  is clear. No pneumothorax or pleural effusion. Cardiomediastinal contours  are within normal limits. No acute bony findings. Cholecystectomy clips.     IMPRESSION:  Pulmonary opacities which are asymmetric to the right perihilar region and  periphery of the right lung. Multi obar pneumonia is suspected in the  appropriate clinical setting. Recommend follow-up chest imaging after  therapy to document resolution due to the asymmetric nature of the opacities  to exclude the presence of an underlying inciting lesion.    [ER]   1845 Chest x-ray shows evidence of pneumonia in the right lung. Patient is already on Augmentin, will add azithromycin.    [ER]   1845 Curb 65 score of 2. Patient's preference is to be discharged. She is well-appearing, feel that is reasonable with close outpatient follow-up. [ER]   1935 Flu swab negative.     [ER]      ED Course User Index  [ER] Rudi Brock MD        During the patient's ED course, the patient was given:  Medications   0.9 % sodium chloride bolus (500 mLs Intravenous New Bag 12/1/20 1856)   cefTRIAXone (ROCEPHIN) 1 g in sterile water 10 mL IV syringe (has no administration in time range)   azithromycin (ZITHROMAX) tablet 500 mg (500 mg Oral Given 12/1/20 1855)      Chest x-ray showed evidence of right-sided pneumonia. Patient may also have coronavirus based off her symptoms. Patient has received 1 day of outpatient antibiotics. Will give further antibiotics in the emergency department. Curb 65 score of 2. On ambulation, patient's oxygen saturation dropped below 90%. She has also been mildly tachycardic. Patient reports overall that she feels her symptoms are worsening rather than improving. Given patient's age, evidence of kidney injury, pneumonia, and failed walk of life - I do feel that she meets inpatient criteria at this time. Had a discussion with the patient and her son regarding the risks and benefits of admission versus outpatient management. At this time, they felt that inpatient admission was preferred. With diarrhea multi lobar pneumonia, Legionella is a consideration, patient did receive azithromycin. First dose of azithromycin was completed orally given that the plan was possibly to discharge the patient at that time. This time, do feel that she needs to be admitted, added IV Rocephin. EKG showed no evidence of ischemia. Troponin was within normal limits. Patient is denying any chest pain or shortness of breath. At this time, I have low suspicion for ACS. BNP was within normal limits, no evidence of fluid overload on exam. Low Suspicion for CHF exacerbation. Chest x-ray showed no evidence of pleural effusion, pulmonary edema, or pneumothorax. At this time I have low concern for pulmonary embolism. Patient has not had a previous blood clot. Patient denies other risk factors for pulmonary embolism. Patient does not have any evidence of DVT on exam.  Patient is low risk on Wells criteria.  At this time, considering that risks associated with further work-up for pulmonary embolism outweigh the likelihood of this diagnosis. Based on results of work-up, I am concerned for pneumonia versus coronavirus. At this time, do feel the patient meets criteria for inpatient management and requires admission for further work-up and management. Discussed the patient with hospital team.    CLINICAL IMPRESSION  1. Pneumonia due to organism    2. Suspected COVID-19 virus infection    3. Hypoxia        Blood pressure 121/65, pulse 95, temperature 99.5 °F (37.5 °C), resp. rate 18, height 5' 6\" (1.676 m), weight 138 lb (62.6 kg), SpO2 95 %, not currently breastfeeding. DISPOSITION  Adriana Izquierdo was Signed out to oncoming provider while awaiting admission in stable condition. DISCLAIMER: This chart was created using Dragon dictation software. Efforts were made by me to ensure accuracy, however some errors may be present due to limitations of this technology and occasionally words are not transcribed correctly.             Joyce Dior MD  12/01/20 5014

## 2020-12-02 VITALS
OXYGEN SATURATION: 98 % | RESPIRATION RATE: 18 BRPM | BODY MASS INDEX: 23.21 KG/M2 | DIASTOLIC BLOOD PRESSURE: 59 MMHG | TEMPERATURE: 98 F | HEIGHT: 66 IN | SYSTOLIC BLOOD PRESSURE: 110 MMHG | WEIGHT: 144.44 LBS | HEART RATE: 79 BPM

## 2020-12-02 LAB
BASOPHILS ABSOLUTE: 0 K/UL (ref 0–0.2)
BASOPHILS RELATIVE PERCENT: 0.2 %
D DIMER: 314 NG/ML DDU (ref 0–229)
EKG ATRIAL RATE: 102 BPM
EKG DIAGNOSIS: NORMAL
EKG P AXIS: 70 DEGREES
EKG P-R INTERVAL: 134 MS
EKG Q-T INTERVAL: 334 MS
EKG QRS DURATION: 82 MS
EKG QTC CALCULATION (BAZETT): 435 MS
EKG R AXIS: 45 DEGREES
EKG T AXIS: 70 DEGREES
EKG VENTRICULAR RATE: 102 BPM
EOSINOPHILS ABSOLUTE: 0 K/UL (ref 0–0.6)
EOSINOPHILS RELATIVE PERCENT: 0 %
FIBRINOGEN: 379 MG/DL (ref 200–397)
HCT VFR BLD CALC: 34.1 % (ref 36–48)
HEMOGLOBIN: 11.7 G/DL (ref 12–16)
INR BLD: 1.03 (ref 0.86–1.14)
LYMPHOCYTES ABSOLUTE: 0.5 K/UL (ref 1–5.1)
LYMPHOCYTES RELATIVE PERCENT: 11.3 %
MCH RBC QN AUTO: 31.3 PG (ref 26–34)
MCHC RBC AUTO-ENTMCNC: 34.4 G/DL (ref 31–36)
MCV RBC AUTO: 90.8 FL (ref 80–100)
MONOCYTES ABSOLUTE: 0.2 K/UL (ref 0–1.3)
MONOCYTES RELATIVE PERCENT: 3.4 %
NEUTROPHILS ABSOLUTE: 4 K/UL (ref 1.7–7.7)
NEUTROPHILS RELATIVE PERCENT: 85.1 %
PDW BLD-RTO: 12.3 % (ref 12.4–15.4)
PLATELET # BLD: 238 K/UL (ref 135–450)
PMV BLD AUTO: 7.2 FL (ref 5–10.5)
PROCALCITONIN: 0.05 NG/ML (ref 0–0.15)
PROCALCITONIN: 0.07 NG/ML (ref 0–0.15)
PROTHROMBIN TIME: 11.9 SEC (ref 10–13.2)
RBC # BLD: 3.76 M/UL (ref 4–5.2)
SARS-COV-2, PCR: DETECTED
VITAMIN D 25-HYDROXY: 39.9 NG/ML
WBC # BLD: 4.8 K/UL (ref 4–11)

## 2020-12-02 PROCEDURE — 99239 HOSP IP/OBS DSCHRG MGMT >30: CPT | Performed by: INTERNAL MEDICINE

## 2020-12-02 PROCEDURE — 84145 PROCALCITONIN (PCT): CPT

## 2020-12-02 PROCEDURE — 96372 THER/PROPH/DIAG INJ SC/IM: CPT

## 2020-12-02 PROCEDURE — 85025 COMPLETE CBC W/AUTO DIFF WBC: CPT

## 2020-12-02 PROCEDURE — 2580000003 HC RX 258: Performed by: INTERNAL MEDICINE

## 2020-12-02 PROCEDURE — 85379 FIBRIN DEGRADATION QUANT: CPT

## 2020-12-02 PROCEDURE — 36415 COLL VENOUS BLD VENIPUNCTURE: CPT

## 2020-12-02 PROCEDURE — 6370000000 HC RX 637 (ALT 250 FOR IP): Performed by: INTERNAL MEDICINE

## 2020-12-02 PROCEDURE — G0378 HOSPITAL OBSERVATION PER HR: HCPCS

## 2020-12-02 PROCEDURE — 93010 ELECTROCARDIOGRAM REPORT: CPT | Performed by: INTERNAL MEDICINE

## 2020-12-02 PROCEDURE — 85610 PROTHROMBIN TIME: CPT

## 2020-12-02 PROCEDURE — 6360000002 HC RX W HCPCS: Performed by: INTERNAL MEDICINE

## 2020-12-02 PROCEDURE — 85384 FIBRINOGEN ACTIVITY: CPT

## 2020-12-02 RX ORDER — BENZONATATE 100 MG/1
100 CAPSULE ORAL 3 TIMES DAILY PRN
Qty: 21 CAPSULE | Refills: 0 | Status: SHIPPED | OUTPATIENT
Start: 2020-12-02 | End: 2020-12-09

## 2020-12-02 RX ORDER — DEXAMETHASONE 2 MG/1
TABLET ORAL
Qty: 18 TABLET | Refills: 0 | Status: SHIPPED | OUTPATIENT
Start: 2020-12-02 | End: 2020-12-11

## 2020-12-02 RX ADMIN — GUAIFENESIN 600 MG: 600 TABLET, EXTENDED RELEASE ORAL at 09:01

## 2020-12-02 RX ADMIN — Medication 2000 UNITS: at 09:01

## 2020-12-02 RX ADMIN — ENOXAPARIN SODIUM 40 MG: 40 INJECTION SUBCUTANEOUS at 09:01

## 2020-12-02 RX ADMIN — DEXAMETHASONE 6 MG: 4 TABLET ORAL at 09:01

## 2020-12-02 RX ADMIN — Medication 10 ML: at 09:02

## 2020-12-02 NOTE — CARE COORDINATION
Assessment attempted, VM left for son Jessica to call back. Number on face sheet was incorrect. RN FELICIA spoke to Pts grandson who is at her house and he gave correct phone number for Pt's son, and confirms that he is the [address]

## 2020-12-02 NOTE — PROGRESS NOTES
4 Eyes Skin Assessment     The patient is being assess for   Admission    I agree that 2 RN's have performed a thorough Head to Toe Skin Assessment on the patient. ALL assessment sites listed below have been assessed. Areas assessed by both nurses:   [x]   Head, Face, and Ears   [x]   Shoulders, Back, and Chest, Abdomen  [x]   Arms, Elbows, and Hands   [x]   Coccyx, Sacrum, and Ischium  [x]   Legs, Feet, and Heels        Scab Rt hand. **SHARE this note so that the co-signing nurse is able to place an eSignature**    Co-signer eSignature: Electronically signed by Charu Dubois RN on 12/2/20 at 1:50 AM EST    Does the Patient have Skin Breakdown?   No          Kyle Prevention initiated:  NA   Wound Care Orders initiated:  NA      WOC nurse consulted for Pressure Injury (Stage 3,4, Unstageable, DTI, NWPT, Complex wounds)and New or Established Ostomies:  NA      Primary Nurse eSignature: Electronically signed by Dinorah Gusman RN on 12/2/20 at 1:49 AM EST

## 2020-12-02 NOTE — DISCHARGE SUMMARY
Name:  Merary Grier  Room:  0224/0224-01  MRN:    [id number]    Discharge Summary      This discharge summary is in conjunction with a complete physical exam done on the day of discharge. Attending Physician: Dr. Jorje Rizo  Discharging Physician: Dr. Kaden Farleyi: 12/1/2020  Discharge:   12/2/2020    HPI:  [de-identified] y.o. female who presented to [facility] emergency department with a chief complaint of generalized illness. The patient states that over the last week she has not felt well. She also endorses subjective fevers, increased fatigue and diarrhea. She also endorses nausea and one episode of vomiting. She denies any shortness of breath, chest pain. She denies any sick contacts or exposures to anyone with with COVID-19. In the emergency department work-up showed multifocal pneumonia. On ambulation she desaturated to the high 80s on room air. She will be admitted for hypoxia and pneumonia. Diagnoses this Admission and Hospital Course   Acute Respiratory Failure has been ruled out after study. - due to COVID-19, multifocal pneumonia due to COVID. - admitted to med-surg.    - oxygen only dropped with exertion. - currently stable on RA.      COVID-19 Pneumonia  - started on Decadron D#2  - Tessalon prn, Robitussin prn  D/c on Decadron, Tessalon     Hypertension  - BP is borderline. Held home Lisinopril.      Hyperlipidemia  - held/discontinued statin.      DVT Prophylaxis: Lovenox    Procedures (Please Review Full Report for Details)  N/A    Consults    N/A      Physical Exam at Discharge:    BP (!) 110/59   Pulse 79   Temp 98 °F (36.7 °C) (Oral)   Resp 18   Ht 5' 6\" (1.676 m)   Wt 144 lb 7 oz (65.5 kg)   SpO2 98%   BMI 23.31 kg/m²     Gen: No distress. Alert. Eyes: PERRL. No sclera icterus. No conjunctival injection. ENT: No discharge. Pharynx clear. Neck: No JVD.  No Carotid Bruit. Trachea midline. Resp: No accessory muscle use. No crackles. No wheezes. No rhonchi.    CV: Regular rate. Regular rhythm. No murmur. No rub. No edema. Capillary Refill: Brisk,< 3 seconds   Peripheral Pulses: +2 palpable, equal bilaterally   GI: Non-tender. Non-distended. No masses. No organomegaly. Normal bowel sounds. No hernia. Skin: Warm and dry. No nodule on exposed extremities. No rash on exposed extremities. M/S: No cyanosis. No joint deformity. No clubbing. Neuro: Awake. Grossly nonfocal    Psych: Oriented x 3. No anxiety or agitation    CBC:   Recent Labs     12/01/20  1750 12/01/20 2253 12/02/20  0530   WBC 6.1 5.8 4.8   HGB 12.1 11.7* 11.7*   HCT 35.5* 33.9* 34.1*   MCV 91.5 91.0 90.8    227 238     BMP:   Recent Labs     12/01/20  1750   *   K 4.3   CL 99   CO2 24   BUN 21*   CREATININE 1.0     LIVER PROFILE:   Recent Labs     12/01/20  1750   AST 36   ALT 25   BILITOT 0.4   ALKPHOS 57     PT/INR:   Recent Labs     12/01/20  1750 12/01/20  2253 12/02/20  0530   PROTIME 11.8 11.4 11.9   INR 1.02 0.98 1.03       CARDIAC ENZYMES  Recent Labs     12/01/20 1750   TROPONINI <0.01       U/A:    Lab Results   Component Value Date    NITRITE Negative 07/08/2012    COLORU YELLOW 11/12/2012    WBCUA Rare 11/12/2012    RBCUA 3-5 11/12/2012    BACTERIA 1+ 07/08/2012    CLARITYU CLEAR 11/12/2012    SPECGRAV 1.010 11/12/2012    LEUKOCYTESUR NEGATIVE 11/12/2012    BLOODU TRACE 11/12/2012    GLUCOSEU NEGATIVE 11/12/2012    GLUCOSEU NEGATIVE 04/30/2012       CULTURES  Rapid influenza: negative  COVID: detected    RADIOLOGY  XR CHEST PORTABLE   Final Result   Pulmonary opacities which are asymmetric to the right perihilar region and   periphery of the right lung. Multi obar pneumonia is suspected in the   appropriate clinical setting. Recommend follow-up chest imaging after   therapy to document resolution due to the asymmetric nature of the opacities   to exclude the presence of an underlying inciting lesion.                Discharge Medications     Medication List      START taking these medications    benzonatate 100 MG capsule  Commonly known as:  TESSALON  Take 1 capsule by mouth 3 times daily as needed for Cough     dexamethasone 2 MG tablet  Commonly known as:  DECADRON  Take 3 tablets by mouth daily for 3 days, THEN 2 tablets daily for 3 days, THEN 1 tablet daily for 3 days. Start taking on:  December 2, 2020        CONTINUE taking these medications    atorvastatin 40 MG tablet  Commonly known as:  LIPITOR     calcium carbonate 500 MG Tabs tablet  Commonly known as:  OSCAL     Co Q 10 100 MG Caps     gabapentin 300 MG capsule  Commonly known as:  NEURONTIN     lisinopril 10 MG tablet  Commonly known as:  PRINIVIL;ZESTRIL     therapeutic multivitamin-minerals tablet     VITAMIN-B COMPLEX PO        STOP taking these medications    amoxicillin-clavulanate 875-125 MG per tablet  Commonly known as:  AUGMENTIN     simvastatin 20 MG tablet  Commonly known as:  ZOCOR           Where to Get Your Medications      These medications were sent to [id number] [place] [address], [address] [place] [id number]    Phone:  [phone]   · benzonatate 100 MG capsule  · dexamethasone 2 MG tablet           Discharged in stable condition to home. She was asked to monitor her oxygen level breath her pulse ox at home. I wrote for one. She is asked to come back to the hospital if her oxygenation got worse. Follow Up: Follow up with PCP. More than 30 minutes spent.       [address] [place] [address] Road 12/3/2020 2:44 PM

## 2020-12-02 NOTE — PROGRESS NOTES
AM assessment completed, see flow sheet. Pt is alert and oriented. Vital signs are WNL. Respirations are even & easy on RA resting in bed. Productive persistent cough clear thin sputum. No complaints voiced. Pt denies needs at this time. SR up x 2, and bed in low position. Call light is within reach.

## 2020-12-02 NOTE — H&P
Hospital Medicine History & Physical      PCP: Dayan King MD    Date of Admission: 12/1/2020    Date of Service: Pt seen/examined on 12/1/2020    Chief Complaint: Shortness of breath and cough    History Of Present Illness:    [de-identified] y.o. female who presented to [facility] emergency department with a chief complaint of generalized illness. The patient states that over the last week she has not felt well. She also endorses subjective fevers, increased fatigue and diarrhea. She also endorses nausea and one episode of vomiting. She denies any shortness of breath, chest pain. She denies any sick contacts or exposures to anyone with with COVID-19. In the emergency department work-up showed multifocal pneumonia. On ambulation she desaturated to the high 80s on room air. She will be admitted for hypoxia and pneumonia. Past Medical History:        Diagnosis Date    Anesthesia complication     \" thrashed about\"    Cancer ([address] breast    Hyperlipidemia     Hypertension     S/P chemotherapy, time since greater than 12 weeks     Wears dentures        Past Surgical History:          Procedure Laterality Date    BREAST SURGERY Left     mastectomy    BREAST SURGERY Right     implant    CHOLECYSTECTOMY      COLONOSCOPY      HEMORRHOID SURGERY      HYSTERECTOMY      INTRACAPSULAR CATARACT EXTRACTION Left 12/12/2019    PHACO EMULSIFICATION OF CATARACT WITH INTRAOCULAR LENS IMPLANT LEFT EYE performed by Malcolm Sousa MD at [address] [place] [address] Drive Left     TOTAL KNEE ARTHROPLASTY Left 11/8/2013       Medications Prior to Admission:      Prior to Admission medications    Medication Sig Start Date End Date Taking? Authorizing Provider   atorvastatin (LIPITOR) 40 MG tablet  11/16/20   Historical Provider, MD   amoxicillin-clavulanate (AUGMENTIN) 875-125 MG per tablet  11/30/20   Historical Provider, MD   gabapentin (NEURONTIN) 300 MG capsule Take 300 mg by mouth once.     Historical Provider, MD   Coenzyme Q10 (CO Q 10) 100 MG CAPS Take 1 capsule by mouth 2 times daily. Historical Provider, MD   B Complex Vitamins (VITAMIN-B COMPLEX PO) Take 1 tablet by mouth daily. Historical Provider, MD   calcium carbonate (OSCAL) 500 MG TABS tablet Take 500 mg by mouth daily. Historical Provider, MD   therapeutic multivitamin-minerals (THERAGRAN-M) tablet Take 1 tablet by mouth daily. Historical Provider, MD   lisinopril (PRINIVIL;ZESTRIL) 10 MG tablet Take 10 mg by mouth daily. Historical Provider, MD       Allergies:  Promethazine hcl; Zofran [ondansetron hcl]; Ondansetron hcl; and Morphine    Social History:    TOBACCO:   reports that she has never smoked. She has never used smokeless tobacco.  ETOH:   reports no history of alcohol use. Family History:    History reviewed. No pertinent family history. REVIEW OF SYSTEMS:   Constitutional:   Subjective fevers and chills  ENT:  Negative for rhinorrhea, epistaxis, hoarseness, sore throat. Respiratory: Negative for SOB or wheezing   Cardiovascular:   Negative for  chest pain, palpitations   Gastrointestinal:  Negative for nausea, vomiting, diarrhea  Genitourinary:   Nausea vomiting and diarrhea  Hematologic/Lymphatic:  Negative for  bleeding tendency, easy bruising  Musculoskeletal:  Negative for myalgias and arthralgias  Neurologic:  Negative for  confusion,dysarthria. Skin:  Negative for itching,rash  Psychiatric:  Negative for depression,anxiety, agitation. Endocrine:  Negative for polydipsia,polyuria,heat /cold intolerance. PHYSICAL EXAM:    BP (!) 115/58   Pulse 90   Temp 99.5 °F (37.5 °C)   Resp 16   Ht 5' 6\" (1.676 m)   Wt 138 lb (62.6 kg)   SpO2 94%   BMI 22.27 kg/m²     General appearance:  No apparent distress, appears stated age and cooperative. HEENT:  Normal cephalic, atraumatic without obvious deformity. Pupils equal, round, and reactive to light. Extra ocular muscles intact. Conjunctivae/corneas clear.   Neck: Supple, with full range of motion. No jugular venous distention. Trachea midline. Respiratory:  Normal respiratory effort. Clear to auscultation, bilaterally without Rales/Wheezes/Rhonchi. Cardiovascular:  Regular rate and rhythm with normal S1/S2 without murmurs, rubs or gallops. Abdomen: Soft, non-tender, non-distended with normal bowel sounds. Musculoskeletal:  No clubbing, cyanosis or edema bilaterally. Full range of motion without deformity. Skin: Skin color, texture, turgor normal.  No rashes or lesions. Neurologic:  Neurovascularly intact without any focal sensory/motor deficits. Cranial nerves: II-XII intact, grossly non-focal.  Psychiatric:  Alert and oriented, thought content appropriate, normal insight  Capillary Refill: Brisk,< 3 seconds   Peripheral Pulses: +2 palpable, equal bilaterally       Labs:   Recent Labs     12/01/20  1750   WBC 6.1   HGB 12.1   HCT 35.5*        Recent Labs     12/01/20  1750   *   K 4.3   CL 99   CO2 24   BUN 21*   CREATININE 1.0   CALCIUM 8.7     Recent Labs     12/01/20  1750   AST 36   ALT 25   BILITOT 0.4   ALKPHOS 57     Recent Labs     12/01/20  1750   INR 1.02     Recent Labs     12/01/20  1750   TROPONINI <0.01       Urinalysis:   Lab Results   Component Value Date    NITRU NEGATIVE 11/12/2012    WBCUA Rare 11/12/2012    BACTERIA 1+ 07/08/2012    RBCUA 3-5 11/12/2012    BLOODU TRACE 11/12/2012    SPECGRAV 1.010 11/12/2012    GLUCOSEU NEGATIVE 11/12/2012    GLUCOSEU NEGATIVE 04/30/2012       Radiology:   XR CHEST PORTABLE   Final Result   Pulmonary opacities which are asymmetric to the right perihilar region and   periphery of the right lung. Multi obar pneumonia is suspected in the   appropriate clinical setting. Recommend follow-up chest imaging after   therapy to document resolution due to the asymmetric nature of the opacities   to exclude the presence of an underlying inciting lesion.              ASSESSMENT:  Acute respiratory failure with hypoxia  Multifocal pneumonia secondary to COVID-19  COVID-19 pneumonia  Hypertension    PLAN:  Start on p.o. Decadron daily  Her oxygen requirement does not meet criteria right now for plasma remdesivir therapy  We will continue to monitor, daily fibrinogen and D-dimer levels  Resume home medications  Home oxygen evaluation tomorrow morning  Supportive care    DVT Prophylaxis: Lovenox  Diet: No diet orders on file  Code Status: Prior    Dispo -admit to [facility]      Thank you for the opportunity to be involved in this patient's care.       (Please note that portions of this note were completed with a voice recognition program. Efforts were made to edit the dictations but occasionally words are mis-transcribed.)

## 2020-12-02 NOTE — CARE COORDINATION
DISCHARGE ORDER  Date/Time 2020 1:55 PM  Completed by: Serge Garcia, Case Management    Patient Name: Sergio Urena      : 1940  Admitting Diagnosis: Pneumonia [J18.9]  Pneumonia [J18.9]      Admit order Date and Status: 20 inpt  (verify MD's last order for status of admission)      Noted discharge order. If applicable PT/OT recommendation at Discharge: N/A  DME recommendation by PT/OT:N/A    Discharge Plan: Order for dc noted. Pt has not  been seen by CA. Several attempts made to contact . Spoke with bedside nurse who states pt is on RA and fully indep. Per nsg up and about in room without difficulty and will send pulse ox with pt to check O2 sat levels. CHart reviewed and no other dc needs identified. Has Home O2 in place on admit:  No  Informed of need to bring portable home O2 tank on day of discharge for nursing to connect prior to leaving:   Not Indicated  Verbalized agreement/Understanding:   Not Indicated  Pt is being d/c'd to home, today. Pt's O2 sats are  98% on RA. Discharge timeout done with CM and nsg All discharge needs and concerns addressed.

## 2020-12-02 NOTE — ED NOTES
Patient up to void and back to bed without difficulty. Speaking with family at this time.       Andreas Cross RN  12/01/20 2050

## 2020-12-02 NOTE — PROGRESS NOTES
Physician Progress Note      Rosio Rodney  [facility] #:                  [id number]  :                       1940  ADMIT DATE:       2020 5:33 PM  [address] [place] DATE:  RESPONDING  PROVIDER #:        Estefania Quintero MD          QUERY TEXT:    Patient admitted with COVID 19. Noted documentation of acute respiratory   failure in H&P on 2020. Please indicate one of the following and   document in the medical record: The medical record reflects the following:  Risk Factors: COVID 19, Pneumonia  Clinical Indicators: RR 16-18, 89% on room air, on room air  Treatment: monitor O2 sats, supportive care    Thank you,  Татьяна Tomlin RN, CDS  [phone]    Acute Respiratory Failure Clinical Indicators per  MS-DRG Training Guide and   Quick Reference Guide:  pO2 < 60 mmHg or SpO2 (pulse oximetry) < 91% breathing room air  pCO2 > 50 and pH < 7.35  P/F ratio (pO2 / FIO2) < 300  pO2 decrease or pCO2 increase by 10 mmHg from baseline (if known)  Supplemental oxygen of 40% or more  Presence of respiratory distress, tachypnea, dyspnea, shortness of breath,   wheezing  Unable to speak in complete sentences  Use of accessory muscles to breathe  Extreme anxiety and feeling of impending doom  Tripod position  Confusion/altered mental status/obtunded  Options provided:  -- Acute Respiratory Failure ruled out after study  -- Acute Respiratory Failure currently as evidenced by, Please document   evidence. -- Currently resolved Acute Respiratory Failure was evidenced by, Please   document evidence  -- Other - I will add my own diagnosis  -- Disagree - Not applicable / Not valid  -- Disagree - Clinically unable to determine / Unknown  -- Refer to Clinical Documentation Reviewer    PROVIDER RESPONSE TEXT:    Acute Respiratory Failure has been ruled out after study.     Query created by: Iggy Warner on 2020 10:53 AM      Electronically signed by:  Estefania Quintero MD 2020 12:23 PM

## 2020-12-03 ENCOUNTER — CARE COORDINATION (OUTPATIENT)
Dept: CASE MANAGEMENT | Age: 80
End: 2020-12-03

## 2020-12-03 NOTE — CARE COORDINATION
[facility] Initial Outreach    Call within 2 business days of discharge: Yes    Patient: Maryann Baca Patient : 1940   MRN: [id number]  Discharge Date: 20   RARS: Readmission Risk Score: 12      Last Discharge [facility]       Complaint Diagnosis Description Type Department Provider    20 Cough Pneumonia due to organism . .. ED to Hosp-Admission (Discharged) (ADMITTED) [facility] 2 Mai Altamirano MD           Spoke with: Maryann Baca     Non-face-to-face services provided:  Obtained and reviewed discharge summary and/or continuity of care documents  Education of patient/family/caregiver/guardian to support self-management-s/s to monitor  Assessment and support for treatment adherence and medication management-new/stopped med review    Challenges to be reviewed by the provider   Additional needs identified to be addressed with provider No    COVID-19 Detected on 2020. Patient informed of results, if available? Yes       Method of communication with provider : none    Advance Care Planning:   Does patient have an Advance Directive:  decision maker updated. Was this a readmission? No  Patient stated reason for admission: cough, SOB  Patients top risk factors for readmission: medical condition    Care Transition Nurse (CTN) contacted the patient by telephone to perform post hospital discharge assessment. Verified name and  with patient as identifiers. Provided introduction to self, and explanation of the CTN role. CTN reviewed discharge instructions, medical action plan and red flags with patient who verbalized understanding. Patient given an opportunity to ask questions and does not have any further questions or concerns at this time. Were discharge instructions available to patient? Yes. Reviewed appropriate site of care based on symptoms and resources available to patient including: PCP and CTN.  The patient agrees to contact the PCP office for questions related

## 2020-12-04 ENCOUNTER — CARE COORDINATION (OUTPATIENT)
Dept: CASE MANAGEMENT | Age: 80
End: 2020-12-04

## 2020-12-04 NOTE — CARE COORDINATION
Patient contacted regarding COVID-19 risk and screening. Care Transition Nurse contacted the family by telephone to perform follow-up assessment. Symptoms reviewed with patient who verbalized the following symptoms: high BP. Patient called stating her BP was elevated but her grandson who helps with her meds did not think she should resume her lisinopril until 12/4. She resumed the medication today. Full med review completed. She is taking meds as prescribed. Denies needs/concerns otherwise. CTN provided contact information for future reference. Plan for follow-up call in 7-14 days based on severity of symptoms and risk factors. Rosario Alicea RN  Care Transition Nurse  [phone] mobile    No future appointments.

## 2020-12-11 ENCOUNTER — CARE COORDINATION (OUTPATIENT)
Dept: CASE MANAGEMENT | Age: 80
End: 2020-12-11

## 2020-12-11 NOTE — CARE COORDINATION
Patient contacted regarding COVID-19 risk and screening. Care Transition Nurse contacted the patient by telephone to perform follow-up assessment. Symptoms reviewed with patient who verbalized the following symptoms: no worsening symptoms. Education provided regarding infection prevention, and signs and symptoms of COVID-19 and when to seek medical attention with patient who verbalized understanding. Still very tired but otherwise well. Her SaO2 95-97%. BP also WNL. Has been experiencing vision changes she has difficulty explaining during call what it is. It is not constant, describes likes she can see around in her head. Denies lightheadedness, dizziness, cp, palpitations, one sided weakness, headache. Completed dexamethasone yesterday. She will monitor over weekend now that off steroid. She will seek emergency care if symptoms worsen. If not better by Monday she was instructed to call her PCP for evaluation. CTN provided contact information for future reference. Plan for follow-up call in 3-5 days based on severity of symptoms and risk factors. Humphrey Marcus RN  Care Transition Nurse  [phone] mobile    No future appointments.

## 2020-12-14 ENCOUNTER — CARE COORDINATION (OUTPATIENT)
Dept: CASE MANAGEMENT | Age: 80
End: 2020-12-14

## 2020-12-14 NOTE — CARE COORDINATION
[facility] Transitions Follow Up Call    2020    Patient: Wes Maldonado  Patient : 1940   MRN: [id number]  Reason for Admission: PNA   Discharge Date: 20 RARS: Readmission Risk Score: 12         Spoke with: Wes Maldonado    Spoke with patient who reported she is doing alright. Patient reported her O2 level is 97% and BP has been normal. Patient reported she checked her bp and it was 104 and she felt a little lightheaded but it improved. CTN encouraged patient to make sure she is staying hydrated and if symptoms of dizziness or low bp continue to reach out to her doctor. Patient reported she also continues to have some weakness and CTN also encouraged patient to discuss with her doctor if ongoing. CTN advised patient of use of urgent care or physicians 24 hr access line if assistance is needed after hours. Care Transitions Subsequent and Final Call    Subsequent and Final Calls  Do you have any ongoing symptoms?: No  Have your medications changed?: No  Do you have any questions related to your medications?: No  Do you currently have any active services?: No  Do you have any needs or concerns that I can assist you with?: No  Identified Barriers: None  Care Transitions Interventions  Other Interventions: Follow Up  No future appointments.     Deepika Schmitt RN

## 2020-12-21 ENCOUNTER — CARE COORDINATION (OUTPATIENT)
Dept: CASE MANAGEMENT | Age: 80
End: 2020-12-21

## 2020-12-21 NOTE — CARE COORDINATION
Patient resolved from the Care Transitions episode on 12/21/2020    Patient/family has been provided the following resources and education related to COVID-19:                         Signs, symptoms and red flags related to COVID-19            CDC exposure and quarantine guidelines            [facility] exposure contact - [phone]            Contact for their local Department of Health               Patient currently reports that the following symptoms have improved:  no new/worsening symptoms     Had PCP OV today states it went well with no changes to meds. Continues to monitor BP and states PCP told her fluctuations are normal. She denies needs/concerns at this time. No further outreach scheduled with this CTN. Episode of Care resolved. Patient has this CTN contact information if future needs arise. Renita Baez RN  Care Transition Nurse  [phone] mobile    No future appointments.

## 2022-06-29 ENCOUNTER — HOSPITAL ENCOUNTER (OUTPATIENT)
Dept: NEUROLOGY | Age: 82
Discharge: HOME | End: 2022-06-29
Payer: MEDICARE

## 2022-06-29 DIAGNOSIS — R20.2 PARESTHESIA: ICD-10-CM

## 2022-06-29 PROCEDURE — 95886 MUSC TEST DONE W/N TEST COMP: CPT

## 2022-06-29 PROCEDURE — 95910 NRV CNDJ TEST 7-8 STUDIES: CPT

## 2022-06-29 NOTE — PROCEDURES
Test Date:  2022    Patient: Shelia Obrien : 1940 Physician: Jhonny Reynoso DO   Sex: Female ID#:  Ref Phys: Fanta Guevara MD     Patient Complaints:  Patient is an 80year-old female who presents with tingling in the hands right more severe onset 3 months ago. Patient History / Exam:  PMH: no endocrine disease. No neck or arm surgery PE: reflexes trace, + thenar atrophy of right hand, bilateral thumb opposition weakness     NCV & EMG Findings:  Evaluation of the right median (APB) motor nerve showed prolonged distal onset latency (5.4 ms) and decreased conduction velocity (48 m/s). The left ulnar (ADM) motor nerve showed decreased conduction velocity (Abv Elbow-Bel Elbow, 47 m/s). The right median sensory nerve showed no response. The left ulnar sensory nerve showed reduced amplitude (14 µV). All remaining nerves (as indicated in the following tables) were within normal limits. All examined muscles (as indicated in the following table) showed no evidence of electrical instability. Impression:  Study is consistent with right carpal tunnel syndrome, moderately severe in nature. 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 no evidence of an acute radiculopathy or other lower motor neuron dysfunction.          Jhonny Reynoso DO        Nerve Conduction Studies  Motor Nerve Results      Latency Amplitude F-Lat Segment Distance CV Comment   Site (ms) Norm (mV) Norm (ms)  (cm) (m/s) Norm    Left Median (APB) Motor   Wrist 4.0  < 4.2 6.3  > 5.0         Elbow 8.6 - 7.0 -  Elbow-Wrist 23 50  > 50    Right Median (APB) Motor   Wrist 5.4  < 4.2 6.5  > 5.0         Elbow 9.8 - - -  Elbow-Wrist 21 48  > 50    Left Ulnar (ADM) Motor   Wrist 4.2  < 4.2 3.1  > 3.0         Bel Elbow 8.3 - 3.0 -  Bel Elbow-Wrist 23 56  > 50    Abv Elbow 10.0 - 3.0 -  Abv Elbow-Bel Elbow 8 47  > 48    Right Ulnar (ADM) Motor   Wrist 3.2  < 4.2 4.4  > 3.0         Bel Elbow 8.0 - 4.5 -  Bel Elbow-Wrist 24 50  > 50 Abv Elbow 9.5 - 4.3 -  Abv Elbow-Bel Elbow 8 53  > 48      Sensory Nerve Results      Latency (Peak) Amplitude (P-P) Segment Distance CV Comment   Site (ms) Norm (µV) Norm  (cm) (m/s) Norm    Left Median Sensory   Wrist-Dig II 3.4  < 3.6 22  > 10 Wrist-Dig II 14 41  > 39    Right Median Sensory   Wrist-Dig II NR  < 3.6 NR  > 10 Wrist-Dig II 14 NR  > 39    Left Ulnar Sensory   Wrist-Dig V 3.5  < 3.7 14  > 15 Wrist-Dig V 14 40  > 38    Right Ulnar Sensory   Wrist-Dig V 3.5  < 3.7 21  > 15 Wrist-Dig V 14 40  > 38        Electromyography     Side Muscle Nerve Root Ins Act Fibs Psw Amp Dur Poly Recrt Int Kennis Merck Comment   Right Deltoid Axillary C5-C6 Nml Nml Nml Nml Nml 0 Nml Nml    Right Biceps Musculocut C5-C6 Nml Nml Nml Nml Nml 0 Nml Nml    Right Triceps Radial C6-C8 Nml Nml Nml Nml Nml 0 Nml Nml    Right Brachiorad Radial C5-C6 Nml Nml Nml Nml Nml 0 Nml Nml    Right Pronator Teres Median C6-C7 Nml Nml Nml Nml Nml 0 Nml Nml    Right EIP Post Interosseous,  R... C7-C8 Nml Nml Nml Nml Nml 0 Nml Nml    Right APB Median C8-T1 Nml Nml Nml Nml Nml 0 Nml Nml    Right FDI Ulnar C8-T1 Nml Nml Nml Nml Nml 0 Nml Nml    Right Cervical Paraspinal (Uppe. .. Rami C1-C3 Nml Nml Nml         Right Cervical Paraspinal (Mid) Rami C4-C6 Nml Nml Nml         Right Cervical Paraspinal (Michelle Palma. .. Rami C7-C8 Nml Nml Nml         Left Deltoid Axillary C5-C6 Nml Nml Nml Nml Nml 0 Nml Nml    Left Biceps Musculocut C5-C6 Nml Nml Nml Nml Nml 0 Nml Nml    Left Triceps Radial C6-C8 Nml Nml Nml Nml Nml 0 Nml Nml    Left Brachiorad Radial C5-C6 Nml Nml Nml Nml Nml 0 Nml Nml    Left Pronator Teres Median C6-C7 Nml Nml Nml Nml Nml 0 Nml Nml    Left EIP Post Interosseous,  R... C7-C8 Nml Nml Nml Nml Nml 0 Nml Nml    Left APB Median C8-T1 Nml Nml Nml Nml Nml 0 Nml Nml    Left FDI Ulnar C8-T1 Nml Nml Nml Nml Nml 0 Nml Nml    Left Cervical Paraspinal (Uppe. ..  Rami C1-C3 Nml Nml Nml         Left Cervical Paraspinal (Mid) Rami C4-C6 Nml Nml Nml         Left Cervical Paraspinal Jennifer Franz. ..  Rami C7-C8 Nml Nml Nml             Electronically signed by Wei Thrasher DO on 6/29/2022 at 8:55 AM

## 2022-11-11 ENCOUNTER — APPOINTMENT (OUTPATIENT)
Dept: CT IMAGING | Age: 82
End: 2022-11-11
Payer: MEDICARE

## 2022-11-11 ENCOUNTER — HOSPITAL ENCOUNTER (EMERGENCY)
Age: 82
Discharge: HOME | End: 2022-11-11
Attending: EMERGENCY MEDICINE
Payer: MEDICARE

## 2022-11-11 VITALS
OXYGEN SATURATION: 98 % | HEIGHT: 66 IN | BODY MASS INDEX: 22.02 KG/M2 | DIASTOLIC BLOOD PRESSURE: 67 MMHG | TEMPERATURE: 98 F | WEIGHT: 137 LBS | RESPIRATION RATE: 14 BRPM | SYSTOLIC BLOOD PRESSURE: 125 MMHG | HEART RATE: 67 BPM

## 2022-11-11 DIAGNOSIS — I10 PRIMARY HYPERTENSION: Primary | ICD-10-CM

## 2022-11-11 DIAGNOSIS — G56.01 RIGHT CARPAL TUNNEL SYNDROME: ICD-10-CM

## 2022-11-11 LAB
ANION GAP SERPL CALCULATED.3IONS-SCNC: 7 MMOL/L (ref 3–16)
BASOPHILS ABSOLUTE: 0.1 K/UL (ref 0–0.2)
BASOPHILS RELATIVE PERCENT: 1.5 %
BUN BLDV-MCNC: 21 MG/DL (ref 7–20)
CALCIUM SERPL-MCNC: 9.1 MG/DL (ref 8.3–10.6)
CHLORIDE BLD-SCNC: 97 MMOL/L (ref 99–110)
CO2: 28 MMOL/L (ref 21–32)
CREAT SERPL-MCNC: 1.1 MG/DL (ref 0.6–1.2)
EKG ATRIAL RATE: 67 BPM
EKG DIAGNOSIS: NORMAL
EKG P AXIS: 78 DEGREES
EKG P-R INTERVAL: 150 MS
EKG Q-T INTERVAL: 378 MS
EKG QRS DURATION: 76 MS
EKG QTC CALCULATION (BAZETT): 399 MS
EKG R AXIS: 65 DEGREES
EKG T AXIS: 78 DEGREES
EKG VENTRICULAR RATE: 67 BPM
EOSINOPHILS ABSOLUTE: 0.1 K/UL (ref 0–0.6)
EOSINOPHILS RELATIVE PERCENT: 3.1 %
GFR SERPL CREATININE-BSD FRML MDRD: 50 ML/MIN/{1.73_M2}
GLUCOSE BLD-MCNC: 111 MG/DL (ref 70–99)
HCT VFR BLD CALC: 33.8 % (ref 36–48)
HEMOGLOBIN: 11.5 G/DL (ref 12–16)
LYMPHOCYTES ABSOLUTE: 0.9 K/UL (ref 1–5.1)
LYMPHOCYTES RELATIVE PERCENT: 22.7 %
MCH RBC QN AUTO: 31.2 PG (ref 26–34)
MCHC RBC AUTO-ENTMCNC: 34.1 G/DL (ref 31–36)
MCV RBC AUTO: 91.5 FL (ref 80–100)
MONOCYTES ABSOLUTE: 0.5 K/UL (ref 0–1.3)
MONOCYTES RELATIVE PERCENT: 12.9 %
NEUTROPHILS ABSOLUTE: 2.3 K/UL (ref 1.7–7.7)
NEUTROPHILS RELATIVE PERCENT: 59.8 %
PDW BLD-RTO: 12.8 % (ref 12.4–15.4)
PLATELET # BLD: 295 K/UL (ref 135–450)
PMV BLD AUTO: 6.3 FL (ref 5–10.5)
POTASSIUM REFLEX MAGNESIUM: 4.6 MMOL/L (ref 3.5–5.1)
RBC # BLD: 3.69 M/UL (ref 4–5.2)
SEDIMENTATION RATE, ERYTHROCYTE: 18 MM/HR (ref 0–30)
SODIUM BLD-SCNC: 132 MMOL/L (ref 136–145)
TROPONIN: <0.01 NG/ML
WBC # BLD: 3.8 K/UL (ref 4–11)

## 2022-11-11 PROCEDURE — 70450 CT HEAD/BRAIN W/O DYE: CPT

## 2022-11-11 PROCEDURE — 85652 RBC SED RATE AUTOMATED: CPT

## 2022-11-11 PROCEDURE — 36415 COLL VENOUS BLD VENIPUNCTURE: CPT

## 2022-11-11 PROCEDURE — 85025 COMPLETE CBC W/AUTO DIFF WBC: CPT

## 2022-11-11 PROCEDURE — 93005 ELECTROCARDIOGRAM TRACING: CPT | Performed by: EMERGENCY MEDICINE

## 2022-11-11 PROCEDURE — 84484 ASSAY OF TROPONIN QUANT: CPT

## 2022-11-11 PROCEDURE — 80048 BASIC METABOLIC PNL TOTAL CA: CPT

## 2022-11-11 PROCEDURE — 99284 EMERGENCY DEPT VISIT MOD MDM: CPT

## 2022-11-11 ASSESSMENT — PAIN - FUNCTIONAL ASSESSMENT: PAIN_FUNCTIONAL_ASSESSMENT: NONE - DENIES PAIN

## 2022-11-11 NOTE — ED PROVIDER NOTES
[address] ED      CHIEF COMPLAINT  Hypertension       HISTORY OF PRESENT ILLNESS  Ela Izquierdo is a 80 y.o. female  who presents to the ED complaining of htn. Got as high as 491 systolic. Feels \"funny\" when her BP goes up. Usually 149-150 when she checks it. Felt \"numb\" in left facial area right at her temple, then sat back in chair and tried to relax and it went away. Has been out raking leaves and really exerting herself. Rides a stationary bike. Usually feels really well and is quite active. No chest pain. No headache. No diplopia. No other numbness, tingling or weakness other than carpel tunnel in her right hand. No other complaints, modifying factors or associated symptoms. I have reviewed the following from the nursing documentation. Past Medical History:   Diagnosis Date    Anesthesia complication     \" thrashed about\"    Cancer ([facility] [address] breast    COVID-19 12/01/2020    Hyperlipidemia     Hypertension     S/P chemotherapy, time since greater than 12 weeks     Wears dentures      Past Surgical History:   Procedure Laterality Date    BREAST SURGERY Left     mastectomy    BREAST SURGERY Right     implant    CHOLECYSTECTOMY      COLONOSCOPY      HEMORRHOID SURGERY      HYSTERECTOMY (CERVIX STATUS UNKNOWN)      INTRACAPSULAR CATARACT EXTRACTION Left 12/12/2019    PHACO EMULSIFICATION OF CATARACT WITH INTRAOCULAR LENS IMPLANT LEFT EYE performed by Bhavna Grimes MD at [address] [place] [address], S. Left     TOTAL KNEE ARTHROPLASTY Left 11/8/2013     History reviewed. No pertinent family history. Social History     Socioeconomic History    Marital status:       Spouse name: Not on file    Number of children: Not on file    Years of education: Not on file    Highest education level: Not on file   Occupational History    Not on file   Tobacco Use    Smoking status: Never    Smokeless tobacco: Never   Vaping Use    Vaping Use: Never used   Substance and Sexual Activity Alcohol use: No    Drug use: No    Sexual activity: Not Currently   Other Topics Concern    Not on file   Social History Narrative    Not on file     Social Determinants of Health     Financial Resource Strain: Not on file   Food Insecurity: Not on file   Transportation Needs: Not on file   Physical Activity: Not on file   Stress: Not on file   Social Connections: Not on file   Intimate Partner Violence: Not on file   Housing Stability: Not on file     No current facility-administered medications for this encounter. Current Outpatient Medications   Medication Sig Dispense Refill    atorvastatin (LIPITOR) 40 MG tablet Take 40 mg by mouth daily       gabapentin (NEURONTIN) 300 MG capsule Take 300 mg by mouth daily. Coenzyme Q10 (CO Q 10) 100 MG CAPS Take 1 capsule by mouth daily       B Complex Vitamins (VITAMIN-B COMPLEX PO) Take 1 tablet by mouth daily. calcium carbonate (OSCAL) 500 MG TABS tablet Take 500 mg by mouth daily. therapeutic multivitamin-minerals (THERAGRAN-M) tablet Take 1 tablet by mouth daily. lisinopril (PRINIVIL;ZESTRIL) 10 MG tablet Take 10 mg by mouth daily. Facility-Administered Medications Ordered in Other Encounters   Medication Dose Route Frequency Provider Last Rate Last Admin    lidocaine PF 2 % in hylenex   IntrOCUlar Once Torrey Gardner MD        epinephrine and lidocaine 2% PF in BSS injection (1 mL)   IntrOCUlar Once Torrey Gardner MD         Allergies   Allergen Reactions    Promethazine Hcl     Zofran [Ondansetron Hcl]     Ondansetron Hcl     Morphine      \"itching\"  \"itching\"         REVIEW OF SYSTEMS  10 systems reviewed, pertinent positives per HPI otherwise noted to be negative. PHYSICAL EXAM  BP (!) 169/75   Pulse 76   Temp 98 °F (36.7 °C) (Oral)   Resp 16   Ht 5' 6\" (1.676 m)   Wt 137 lb (62.1 kg)   SpO2 98%   BMI 22.11 kg/m²    GENERAL APPEARANCE: Awake and alert. Cooperative. No acute distress. HENT: Normocephalic. Atraumatic.  Mucous membranes are moist.  No drooling or stridor. NECK: Supple. EYES: PERRL. EOM's grossly intact. HEART/CHEST: RRR. No murmurs. 2+ radial pulses b/l   LUNGS: Respirations unlabored. CTAB. Good air exchange. Speaking comfortably in full sentences. ABDOMEN: No tenderness. Soft. Non-distended. No masses. No organomegaly. No guarding or rebound. MUSCULOSKELETAL: No extremity edema. Compartments soft. No deformity. No tenderness in the extremities. All extremities neurovascularly intact. SKIN: Warm and dry. No acute rashes. NEUROLOGICAL: Alert and oriented. CN's 2-12 intact. No gross facial drooping. Adriana Izquierdo's NIH Stroke Scale at 2:35 PM is:  Level of Consciousness:  0 - alert; keenly responsive    LOC Questions:  0 - answers both questions correctly    LOC Commands:  0 - performs both tasks correctly    Best Gaze:  0 - normal    Visual Fields:  0 - no visual loss    Facial Palsy:  0 - normal symmetric movement    Motor-Arm-Left:  0 - no drift, limb holds 90 (or 45) degrees for full 10 seconds    Motor-Leg-Left:  0 - no drift; leg holds 30 degree position for full 5 seconds    Motor-Arm-Right:  0 - no drift, limb holds 90 (or 45) degrees for full 10 seconds    Motor-Leg-Right:  0 - no drift; leg holds 30 degree position for full 5 seconds    Limb Ataxia:  0 - absent    Sensory:  0 - normal; no sensory loss    Best Language:  0 - no aphasia, normal    Dysarthria:  0 - normal    Extinction and Inattention:  0 - no abnormality    PSYCHIATRIC: Normal mood and affect. LABS  I have reviewed all labs for this visit.    Results for orders placed or performed during the hospital encounter of 11/11/22   CBC with Auto Differential   Result Value Ref Range    WBC 3.8 (L) 4.0 - 11.0 K/uL    RBC 3.69 (L) 4.00 - 5.20 M/uL    Hemoglobin 11.5 (L) 12.0 - 16.0 g/dL    Hematocrit 33.8 (L) 36.0 - 48.0 %    MCV 91.5 80.0 - 100.0 fL    MCH 31.2 26.0 - 34.0 pg    MCHC 34.1 31.0 - 36.0 g/dL    RDW 12.8 12.4 - 15.4 % Platelets 069 805 - 016 K/uL    MPV 6.3 5.0 - 10.5 fL    Neutrophils % 59.8 %    Lymphocytes % 22.7 %    Monocytes % 12.9 %    Eosinophils % 3.1 %    Basophils % 1.5 %    Neutrophils Absolute 2.3 1.7 - 7.7 K/uL    Lymphocytes Absolute 0.9 (L) 1.0 - 5.1 K/uL    Monocytes Absolute 0.5 0.0 - 1.3 K/uL    Eosinophils Absolute 0.1 0.0 - 0.6 K/uL    Basophils Absolute 0.1 0.0 - 0.2 K/uL   Basic Metabolic Panel w/ Reflex to MG   Result Value Ref Range    Sodium 132 (L) 136 - 145 mmol/L    Potassium reflex Magnesium 4.6 3.5 - 5.1 mmol/L    Chloride 97 (L) 99 - 110 mmol/L    CO2 28 21 - 32 mmol/L    Anion Gap 7 3 - 16    Glucose 111 (H) 70 - 99 mg/dL    BUN 21 (H) 7 - 20 mg/dL    Creatinine 1.1 0.6 - 1.2 mg/dL    Est, Glom Filt Rate 50 (A) >60    Calcium 9.1 8.3 - 10.6 mg/dL   Troponin   Result Value Ref Range    Troponin <0.01 <0.01 ng/mL   EKG 12 Lead   Result Value Ref Range    Ventricular Rate 67 BPM    Atrial Rate 67 BPM    P-R Interval 150 ms    QRS Duration 76 ms    Q-T Interval 378 ms    QTc Calculation (Bazett) 399 ms    P Axis 78 degrees    R Axis 65 degrees    T Axis 78 degrees    Diagnosis       Normal sinus rhythmNormal ECGWhen compared with ECG of 01-DEC-2020 18:00,Vent. rate has decreased BY  35 BPMNonspecific T wave abnormality no longer evident in Anterior leadsConfirmed by Mor Perez ([id number]) on 11/11/2022 4:16:42 PM       ECG  The Ekg interpreted by me shows  normal sinus rhythm with a rate of 67  Axis is   Normal  QTc is  normal  Intervals and Durations are unremarkable.       ST Segments: no acute change  No significant change from prior EKG dated 12/1/20    RADIOLOGY  CT Head W/O Contrast    Result Date: 11/11/2022  EXAMINATION: CT OF THE HEAD WITHOUT CONTRAST  11/11/2022 3:00 pm TECHNIQUE: CT of the head was performed without the administration of intravenous contrast. Automated exposure control, iterative reconstruction, and/or weight based adjustment of the mA/kV was utilized to reduce the radiation dose to as low as reasonably achievable. COMPARISON: None. HISTORY: ORDERING SYSTEM PROVIDED HISTORY: htn, episode left temporal tingling/numb TECHNOLOGIST PROVIDED HISTORY: Reason for exam:->htn, episode left temporal tingling/numb Has a \"code stroke\" or \"stroke alert\" been called? ->No Decision Support Exception - unselect if not a suspected or confirmed emergency medical condition->Emergency Medical Condition (MA) Reason for Exam: elevated blood pressure, no headache FINDINGS: BRAIN/VENTRICLES: Ventricles are midline in position. No intracerebral masses are identified. No mass effect. No midline shift. No acute intracranial hemorrhage is seen. There is intracranial atherosclerosis. No significant atrophy or small vessel ischemic change for age ORBITS: The visualized portion of the orbits demonstrate no acute abnormality. SINUSES: No significant mastoid opacification noted. Trace mucosal thickening is seen in the ethmoid air cells. SOFT TISSUES/SKULL:  No acute abnormality of the visualized skull or soft tissues. No acute intracranial abnormality. .  No significant atrophy or small vessel ischemic change for age        ED COURSE/MDM  Patient seen and evaluated. Old records reviewed. Labs and imaging reviewed and results discussed with patient. Patient presenting for evaluation of elevated blood pressure. She did have a transient episode where she felt like it right over her left temple she had some paresthesias and numbness. This was a fairly localized area just to the temporal region. She does not have any tenderness over the temporal arteries of the low suspicion for temporal arteritis. I have a low suspicion for intracranial bleed and a CT scan was obtained to evaluate for any possible mass lesion or intracranial hemorrhage and this was negative. Labs unremarkable including creatinine of 1.1. No significant electrolyte abnormality. She is mildly hyponatremic.   Patient encouraged to orally hydrate, monitor blood pressures, continue with her lisinopril as prescribed, and follow-up closely with her PCP. If her blood pressures remain elevated she may be appropriate for an additional antihypertensive. She does watch her diet fairly closely without any significant salt intake. If her blood pressures are persistently elevated she may also benefit from additional outpatient work-up and management such as renal ultrasound to evaluate for the blood supply to the kidneys. At this time though, she has no chest pain, focal neurologic deficits on exam or other suggestion of endorgan damage from her blood pressure being elevated therefore I felt that it was reasonable to discharge patient home with close follow-up. All questions answered prior to discharge. I estimate there is LOW risk for ACUTE CORONARY SYNDROME, INTRACRANIAL HEMORRHAGE, MALIGNANT DYSRHYTHMIA or HYPERTENSION, PULMONARY EMBOLISM, SEPSIS, SUBARACHNOID HEMORRHAGE, SUBDURAL HEMATOMA, STROKE, or THORACIC AORTIC DISSECTION, thus I consider the discharge disposition reasonable. Adriana Izquierdo and I have discussed the diagnosis and risks, and we agree with discharging home to follow-up with their primary doctor. We also discussed returning to the Emergency Department immediately if new or worsening symptoms occur. We have discussed the symptoms which are most concerning (e.g., bloody sputum, fever, worsening pain or shortness of breath, vomiting, weakness) that necessitate immediate return. I, Dr. Jaylon Banks MD, am the primary clinician of record. Is this patient to be included in the SEP-1 Core Measure? No   Exclusion criteria - the patient is NOT to be included for SEP-1 Core Measure due to: Infection is not suspected     During the patient's ED course, the patient was given:  Medications - No data to display     CLINICAL IMPRESSION  1. Primary hypertension    2.  Right carpal tunnel syndrome        Blood pressure (!) 169/75, pulse

## 2022-11-11 NOTE — ED TRIAGE NOTES
Patient with history of HTN and is on lisinopril without any recent changes in dose or missed doses. To ED today with concern for blood pressure being higher than normal.  States it got to 712 systolic. She describes that she briefly had a sensation of tingling above her left eyebrow. This has resolved. Denies headache or chest pain. Alert and oriented.

## 2022-11-11 NOTE — ED NOTES
Educated pt on discharge paperwork as well as follow-up appointment. Pt verbalizes understanding of all instructions and denies questions. IV discontinued, catheter intact, minimal bleeding at IV site, 2x2 and tape applied, manual pressure held. Pt left ambulatory by self with all personal belongings, and discharge paperwork to private residence. Pt in no distress at this time.        Thu Hannon RN  11/11/22 1241

## 2023-02-07 ENCOUNTER — TELEPHONE (OUTPATIENT)
Dept: ORTHOPEDIC SURGERY | Age: 83
End: 2023-02-07

## 2023-02-07 ENCOUNTER — OFFICE VISIT (OUTPATIENT)
Dept: ORTHOPEDIC SURGERY | Age: 83
End: 2023-02-07

## 2023-02-07 VITALS — WEIGHT: 135 LBS | BODY MASS INDEX: 21.69 KG/M2 | HEIGHT: 66 IN

## 2023-02-07 DIAGNOSIS — G56.01 CARPAL TUNNEL SYNDROME OF RIGHT WRIST: Primary | ICD-10-CM

## 2023-02-07 NOTE — PROGRESS NOTES
CHIEF COMPLAINT: Right wrist Pain with numbness and tingling/ carpal tunnel syndrome. HISTORY:  Ms. Houston Karimi is 80 y.o.  female right handed presents today for the first visit for evaluation of a right wrist pain with numbness and tingling which started summer 2022 and is worsening. Denies trauma or injury. The patient is complaining of right wrist pain with numbness and tingling. This is worse in the morning and nothing makes pain better. No other complaint. Past Medical History:   Diagnosis Date    Anesthesia complication     \" thrashed about\"    Cancer ([address] breast    COVID-19 12/01/2020    Hyperlipidemia     Hypertension     S/P chemotherapy, time since greater than 12 weeks     Wears dentures        Past Surgical History:   Procedure Laterality Date    BREAST SURGERY Left     mastectomy    BREAST SURGERY Right     implant    CHOLECYSTECTOMY      COLONOSCOPY      HEMORRHOID SURGERY      HYSTERECTOMY (CERVIX STATUS UNKNOWN)      INTRACAPSULAR CATARACT EXTRACTION Left 12/12/2019    PHACO EMULSIFICATION OF CATARACT WITH INTRAOCULAR LENS IMPLANT LEFT EYE performed by Sonu Hillman MD at [address] [place] [address] Left     TOTAL KNEE ARTHROPLASTY Left 11/8/2013       Social History     Socioeconomic History    Marital status:       Spouse name: Not on file    Number of children: Not on file    Years of education: Not on file    Highest education level: Not on file   Occupational History    Not on file   Tobacco Use    Smoking status: Never    Smokeless tobacco: Never   Vaping Use    Vaping Use: Never used   Substance and Sexual Activity    Alcohol use: No    Drug use: No    Sexual activity: Not Currently   Other Topics Concern    Not on file   Social History Narrative    Not on file     Social Determinants of Health     Financial Resource Strain: Not on file   Food Insecurity: Not on file   Transportation Needs: Not on file   Physical Activity: Not on file   Stress: Not on file   Social Connections: Not on file   Intimate Partner Violence: Not on file   Housing Stability: Not on file       History reviewed. No pertinent family history. Current Outpatient Medications on File Prior to Visit   Medication Sig Dispense Refill    atorvastatin (LIPITOR) 40 MG tablet Take 40 mg by mouth daily       gabapentin (NEURONTIN) 300 MG capsule Take 300 mg by mouth daily. Coenzyme Q10 (CO Q 10) 100 MG CAPS Take 1 capsule by mouth daily       B Complex Vitamins (VITAMIN-B COMPLEX PO) Take 1 tablet by mouth daily. calcium carbonate (OSCAL) 500 MG TABS tablet Take 500 mg by mouth daily. therapeutic multivitamin-minerals (THERAGRAN-M) tablet Take 1 tablet by mouth daily. lisinopril (PRINIVIL;ZESTRIL) 10 MG tablet Take 10 mg by mouth daily. Current Facility-Administered Medications on File Prior to Visit   Medication Dose Route Frequency Provider Last Rate Last Admin    lidocaine PF 2 % in hylenex   IntrOCUlar Once Prudence Waite MD        epinephrine and lidocaine 2% PF in BSS injection (1 mL)   IntrOCUlar Once Prudence Waite MD           Pertinent items are noted in HPI  Review of systems reviewed from Patient History Form and available in the patient's chart under the Media tab. No change noted. PHYSICAL EXAMINATION:  Ms. Brook Cisneros is a very pleasant 80 y.o.  female who presents today in no acute distress, awake, alert, and oriented. She is well dressed, nourished and  groomed. Patient with normal affect. Height is  5' 6\" (1.676 m), weight is 135 lb (61.2 kg), Body mass index is 21.79 kg/m². Resting respiratory rate is 16. Examination of the gait, showed that the patient walks with No limp . Examination of both Upper extremities showing a normal range of motion of the right hand compare to the other side. There is no swelling that can be seen.       Examination for Carpal Tunnel Syndrome shows Carpal Tunnel Compression Test to be mildly positive on the right & negative on the left. Phalen's Maneuver is mildly positive on the right & negative on the left. The patient displays mild baseline symptoms to potentially confound the exam.  The thenar musculature is not atrophied & weakened. IMAGING: Kirstie Kelsey were reviewed, taken today in the office, 3 views of the right hand, and showed no fracture, no other abnormalities. EMG right UE dated 6/29/2022 was reviewed and showed;    Study is consistent with right carpal tunnel syndrome, moderately severe in nature. 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 no evidence of an acute radiculopathy or other lower motor neuron dysfunction. IMPRESSION:  Right wrist Pain with numbness and tingling/ carpal tunnel syndrome. PLAN:  I assured the patient that the xray is negative for acute fracture. I discussed with Adriana Izquierdo the EMG and treatment options including both surgical and non-surgical treatment, and that my recommendation is an open release right CTS. I discussed the risks and benefits of surgery with the patient, including but not limited to infection, bleeding, pain, injury to nerves or blood vessels failure of the surgery and need for additional surgery. All the patient's questions were answered. We discussed an expected post-operative course. She  is understanding of this and wishes to proceed.          Jon Steele MD

## 2023-02-07 NOTE — TELEPHONE ENCOUNTER
Franklin De Luna [id number]    Date: 02/09/23 thru 05/10/23  Type of SX:  Outpatient  Location: [facility]  CPT: 33766   DX Code: G56.00  SX area: Rt hand  Insurance: [employer]

## 2023-02-07 NOTE — TELEPHONE ENCOUNTER
FYI: Patient cxld surgery. She states her blood pressure is high and her PCP recommended to hold off on surgery.

## 2023-02-13 ENCOUNTER — HOSPITAL ENCOUNTER (OUTPATIENT)
Dept: VASCULAR LAB | Age: 83
Discharge: HOME | End: 2023-02-13
Payer: MEDICARE

## 2023-02-13 DIAGNOSIS — R53.83 OTHER FATIGUE: ICD-10-CM

## 2023-02-13 DIAGNOSIS — R55 SYNCOPE AND COLLAPSE: ICD-10-CM

## 2023-02-13 PROCEDURE — 93880 EXTRACRANIAL BILAT STUDY: CPT

## 2023-03-08 ENCOUNTER — OFFICE VISIT (OUTPATIENT)
Dept: ORTHOPEDIC SURGERY | Age: 83
End: 2023-03-08
Payer: MEDICARE

## 2023-03-08 VITALS — WEIGHT: 135 LBS | RESPIRATION RATE: 15 BRPM | HEIGHT: 66 IN | BODY MASS INDEX: 21.69 KG/M2

## 2023-03-08 DIAGNOSIS — G56.01 CARPAL TUNNEL SYNDROME OF RIGHT WRIST: Primary | ICD-10-CM

## 2023-03-08 PROCEDURE — 99204 OFFICE O/P NEW MOD 45 MIN: CPT | Performed by: ORTHOPAEDIC SURGERY

## 2023-03-08 PROCEDURE — 1123F ACP DISCUSS/DSCN MKR DOCD: CPT | Performed by: ORTHOPAEDIC SURGERY

## 2023-03-08 NOTE — LETTER
CONSENT TO OPERATION  AND/OR OTHER PROCEDURE(S)          PATIENT : Adriana Izquierdo   YOB: 1940      DATE : 3/8/23          1. I request and consent that Dr. Loren Buckley. Negin Pierre,  and/or his associates or assistants perform an operation and/or procedures on the above patient at  [place] [address], to treat the condition(s) which appear indicated by the diagnostic studies already performed. I have been told that in general terms the nature, purpose and reasonable expectations of the operation and/or procedure(s) are:     Right Carpal Tunnel Release      2. It has been explained to me by the informing physician that during the course of the operation and/or procedure(s) unforeseen conditions may be revealed that necessitate an extension of the original operation and/or procedure(s) or different operation and/or procedures than those set forth in Paragraph 1. I therefore authorize and request that my physician and/or his associates or assistants perform such operations and/or procedures as are necessary and desirable in the exercise of professional judgment. The authority granted under this Paragraph 2 shall extend to all conditions that require treatment and are known to my physician at the time the operation is commenced. 3. I have been made aware by the informing physician of certain risks and consequences that are associated with the operation and/or procedure(s) described in Paragraph 1, the reasonable alternative methods or treatment, the possible consequences, the possibility that the operation and/or procedure(s) may be unsuccessful and the possibility of complications.   I understand the reasonably known risks to be:      - Bleeding  - Infection  - Poor Healing  - Possible Damage to Nerve, Vessel, Tendon/Muscle or Bone  - Need for further Treatment/Surgery  - Stiffness  - Pain  - Residual or Recurrent Symptoms  - Anesthetic and/or Medical Risks  - We have discussed the specific limitations and risks of hospital and/or office based treatment at this time due to the COVID-19 pandemic                I have been counseled about the risks of cha Covid-19 in the kelli-operative and post-operative periods related to this procedure. I have been made aware that cha Covid-19 around the time of a surgical procedure may worsen my prognosis for recovering from the virus and lend to a higher morbidity and or mortality risk. With this knowledge, I have requested to proceed with the procedure as scheduled. 4. I have also been informed by the informing physician that there are other risks from both known and unknown causes that are attendant to the performance of any surgical procedure. I am aware that the practice of medicine and surgery is not an exact science, and that no guarantees have been made to me concerning the results of the operation and/or procedure(s). 5. I   CONSENT / REFUSE CONSENT  (strike the phrase that does not apply) to the taking of photographs before, during and/or after the operation or procedure for scientific/educational purposes. 6. I consent to the administration of anesthesia and to the use of such anesthetics as may be deemed advisable by the anesthesiologist who has been engaged by me or my physician.     7. I certify that I have read and understand the above consent to operation and/or other procedure(s); that the explanations therein referred to were made to me by the informing physician in advance of my signing this consent; that all blanks or statements requiring insertion or completion were filled in and inapplicable paragraphs, if any, were stricken before I signed; and that all questions asked by me about the operation and/or procedure(s) which I have consented to have been fully answered in a satisfactory manner.                                 _______________________           3/8/23 Witness     Signature Of Patient         Date        Sushma Cruz. Omar                                                 Informing Physician                                           Signature of Informing Physician                              If patient is unable to sign or is a minor, complete one of the following:    (A)  Patient is a minor   years of age. (B)  Patient is unable to sign because: The undersigned represents that he or she is duly authorized to execute this consent for and on behalf of the above named patient. Witness               o  Parent  o  Guardian   o  Spouse       o  Other (specify)                                           Patient Name: Jessi Parnell  Patient YOB: 1940  Dr. Vero Olivera' Return To Work Policy  Regarding your ability to return to work after surgery or injury, Dr. Vero Olivera will not state that any patient is off of work or cannot work at all. He will place you on restrictions after your surgical procedure or injury. Depending on the details of your particular situation, Dr. Vero Olivera may state that you will have either light use or no use of your hand for a specific number of weeks. It is your obligation to communicate with your employer regarding your restrictions. It is your employer's decision as to whether they will accommodate your restrictions (i.e. allow you to come to work in your restricted capacity) or to not allow you to return to work under your restrictions. Dr. Vero Olivera does not participate in making this decision and cannot influence your employer regarding their decision. If you do not communicate your restrictions to your employer, or if you do not present to work as you are scheduled to, Dr. Vero Olivera will not provide an 'excuse' to explain your absence. A doctors note, or official forms (BWC, FMLA, etc.) will be filled out, upon request, to indicate your date of surgery and your restrictions as stated above.   Dr. Vero Olivera' Narcotic Policy  Patients will only be prescribed narcotics after surgical procedures or significant injury. Not all procedures cause pain great enough to require Narcotics and thus, not all patients will receive prescriptions after surgical procedures or injuries. Narcotics are never prescribed for chronic conditions. Narcotics are never prescribed for use longer than one week at a time. Refills are only granted in unusual circumstances and only at Dr. Reece Messina discretion. Patients who are receiving narcotic medication from another physician or who are under pain management contracts will not be given a prescription for narcotics for any reason. Surgery Arrival Time:  You have been advised of the START TIME for your surgery as well as the ARRIVAL TIME at which you need to arrive at the surgery facility. Please understand that there is a certain amount of preparation which takes place at the surgery facility prior to the start of your surgery. If you arrive later than your scheduled ARRIVAL TIME, it may be necessary to cancel your surgery for that day and reschedule your procedure due to lack of adequate time for pre-surgery preparations. Thank you for being on time for your arrival.    I have read the above policies and understand that by agreeing to proceed with treatment by Dr. Nirav Best and his team, that I am agreeing to abide by these policies.   Patient Name:  Veronique Haro    Patient Signature:  _____________________________    DXDXU'C Date:   3/8/23

## 2023-03-08 NOTE — PROGRESS NOTES
This 80 y.o., right hand dominant odonnell is seen in referral for [facility], MD  with a chief complaint of numbness and tingling of the right hand only. Symptoms have been present for several years. The patient also frequently notices pain in the hand, wrist and arm, as well as weakness of , morning stiffness and swelling as well as difficulty performing functions which require fine touch. Symptoms are sometimes worse at night and can disturb sleep. The problem appears to recently have become worse. Conservative treatment has not been prescribed. There is no history of wrist fracture. There is history and/or family history of diabetes. There is no history of thyroid disease. There is no family history of carpal tunnel syndrome. The pain assessment has been reviewed and is correct as stated. The patient's social history, past medical history, family history, medications, allergies and review of systems, entered 3/8/23, have been reviewed, and dated and are recorded in the chart. On examination the patient is Height: 5' 6\" (167.6 cm) tall and weighs Weight: 135 lb (61.2 kg). Respirations are 18 per minute. The patient is well nourished, is oriented to time and place, demonstrates appropriate mood and affect as well as normal gait and station. Cervical range of motion is normal for the patient's stated age and does not produce pain or paresthesias in either upper extremity. There is soft tissue swelling involving the volar aspect of the right wrist.  There is severe right, and mild left, thenar muscle atrophy. The Tinel sign is negative over the median nerve at the  carpal tunnel bilaterally and negative over the ulnar nerve at the elbow bilaterally. The Phalen test is positive on the right at 0 seconds. There is hypalgesia, with associated dysesthesia, on sensory testing over the median nerve distribution. Two point discrimination is abnormal at the tip of the middle finger.   Range of motion of the fingers, thumbs and wrists is normal.  Skin is intact without lesions. Distal circulation is normal.  All joints are stable. Fine motor coordination and gross muscle strength are normal. Deep tendon reflexes are brisk and present bilaterally. There are no subcutaneous masses or enlarged epitrochlear lymph nodes. I have personally reviewed and interpreted all previous external imaging studies(hand Xrays), laboratory tests(ESR, BMP, CBC+diff), diagnostic procedures(EMG) and medical encounters pertinent to this patient's visit today. Review and independent interpretation of an external EMG study, dated 6/29/22 , preformed by Dr. Preet Colby, a physician outside of this office, is abnormal. There is severe right carpal tunnel syndrome. The test results are shared with the patient and her family. Impression:     Carpal tunnel syndrome, right, with clinical evidence of nerve damage. The nature of their medical problem is fully discussed with the patient and family, including all treatment options. Surgery is also discussed, including the possible risks, complications, prognosis and postoperative care. All questions are answered. The surgery consent forms are explained and signed. Surgery will be scheduled and the patient is asked to call me if there are any additional questions. The patient understands that the surgery will be done by Dr. Marty Fung.

## 2023-03-10 ENCOUNTER — TELEPHONE (OUTPATIENT)
Dept: ORTHOPEDIC SURGERY | Age: 83
End: 2023-03-10

## 2023-03-10 NOTE — TELEPHONE ENCOUNTER
Auth: # [id number]    Date: Mar 28, 2023 - Jun 26, 2023  Type of SX:  OUTPATIENT  Location: [facility]  CPT: 64625   DX Code: G56.01  SX area: RT HAND  Insurance: Leonor Grover

## 2023-03-20 NOTE — PROGRESS NOTES
Rolin Arn    Age 80 y.o.    female    1940    MRN [id number]    3/28/2023  Arrival Time_____________  OR Time____________[address]     Procedure(s):  RIGHT CARPAL TUNNEL RELEASE                      Monitor Anesthesia Care    Surgeon(s):  Laquita Martinez, MD       Phone [phone] ([place])     [address]  Cell         Work  _____________________________________________________________________  _____________________________________________________________________  _____________________________________________________________________  _____________________________________________________________________  _____________________________________________________________________    PCP _____________________________ Phone_________________     H&P__________________Bringing      Chart            Epic   DOS      Called________  EKG__________________Bringing      Chart            Epic   DOS      Called________  LAB__________________ Bringing      Chart            Epic   DOS      Called________  Cardiac Clearance_______Bringing      Chart            Epic      DOS      Called________    Cardiologist________________________ Phone___________________________    ? Evangelical concerns / Waiver on Chart            PAT [employer]________________  ? Pre-op Instructions Given [place]          _________________________________  ? Directions to Surgery Center                          _________________________________  ? Transportation Home_______________      __________________________________  ?  Crutches/Walker__________________        __________________________________    ________Pre-op Orders   _______Transcribed    _______Wt.  ________Pharmacy          _______SCD  ______VTE     ______TED Kathy Jakes  _______  Surgery Consent    _______  Anesthesia Consent         COVID DATE______________LOCATION________________ RESULT__________

## 2023-03-22 RX ORDER — CLONIDINE HYDROCHLORIDE 0.1 MG/1
.05-.1 TABLET ORAL DAILY
COMMUNITY

## 2023-03-22 NOTE — PROGRESS NOTES

## 2023-03-22 NOTE — PROGRESS NOTES
Obstructive Sleep Apnea (VERÓNICA) Screening     Patient:  Isaias Lo    YOB: 1940      Medical Record #:  [id number]                     Date:  3/22/2023     1. Are you a loud and/or regular snorer? []  Yes       [x] No    2. Have you been observed to gasp or stop breathing during sleep? []  Yes       [x] No    3. Do you feel tired or groggy upon awakening or do you awaken with a headache?           []  Yes       [] No    4. Are you often tired or fatigued during the wake time hours? []  Yes       [] No    5. Do you fall asleep sitting, reading, watching TV or driving? []  Yes       [] No    6. Do you often have problems with memory or concentration? []  Yes       [] No    **If patient's score is ? 3 they are considered high risk for VERÓNICA. An Anesthesia provider will evaluate the patient and develop a plan of care the day of surgery. Note:  If the patient's BMI is more than 35 kg m¯² , has neck circumference > 40 cm, and/or high blood pressure the risk is greater (© American Sleep Apnea Association, 2006).

## 2023-03-27 ENCOUNTER — ANESTHESIA EVENT (OUTPATIENT)
Dept: OPERATING ROOM | Age: 83
End: 2023-03-27
Payer: MEDICARE

## 2023-03-28 ENCOUNTER — ANESTHESIA (OUTPATIENT)
Dept: OPERATING ROOM | Age: 83
End: 2023-03-28
Payer: MEDICARE

## 2023-03-28 ENCOUNTER — HOSPITAL ENCOUNTER (OUTPATIENT)
Age: 83
Setting detail: OUTPATIENT SURGERY
Discharge: HOME | End: 2023-03-28
Attending: ORTHOPAEDIC SURGERY | Admitting: ORTHOPAEDIC SURGERY
Payer: MEDICARE

## 2023-03-28 VITALS
WEIGHT: 135 LBS | TEMPERATURE: 97.4 F | OXYGEN SATURATION: 99 % | BODY MASS INDEX: 21.69 KG/M2 | HEART RATE: 68 BPM | SYSTOLIC BLOOD PRESSURE: 134 MMHG | HEIGHT: 66 IN | DIASTOLIC BLOOD PRESSURE: 56 MMHG | RESPIRATION RATE: 15 BRPM

## 2023-03-28 PROBLEM — G56.01 RIGHT CARPAL TUNNEL SYNDROME: Status: ACTIVE | Noted: 2023-03-28

## 2023-03-28 PROCEDURE — 3700000000 HC ANESTHESIA ATTENDED CARE: Performed by: ORTHOPAEDIC SURGERY

## 2023-03-28 PROCEDURE — 2709999900 HC NON-CHARGEABLE SUPPLY: Performed by: ORTHOPAEDIC SURGERY

## 2023-03-28 PROCEDURE — 2500000003 HC RX 250 WO HCPCS: Performed by: ORTHOPAEDIC SURGERY

## 2023-03-28 PROCEDURE — 6360000002 HC RX W HCPCS: Performed by: NURSE ANESTHETIST, CERTIFIED REGISTERED

## 2023-03-28 PROCEDURE — A4217 STERILE WATER/SALINE, 500 ML: HCPCS | Performed by: ORTHOPAEDIC SURGERY

## 2023-03-28 PROCEDURE — 7100000010 HC PHASE II RECOVERY - FIRST 15 MIN: Performed by: ORTHOPAEDIC SURGERY

## 2023-03-28 PROCEDURE — 2500000003 HC RX 250 WO HCPCS: Performed by: NURSE ANESTHETIST, CERTIFIED REGISTERED

## 2023-03-28 PROCEDURE — 7100000011 HC PHASE II RECOVERY - ADDTL 15 MIN: Performed by: ORTHOPAEDIC SURGERY

## 2023-03-28 PROCEDURE — 3600000003 HC SURGERY LEVEL 3 BASE: Performed by: ORTHOPAEDIC SURGERY

## 2023-03-28 PROCEDURE — 2580000003 HC RX 258: Performed by: ORTHOPAEDIC SURGERY

## 2023-03-28 RX ORDER — LIDOCAINE HYDROCHLORIDE 20 MG/ML
INJECTION, SOLUTION INFILTRATION; PERINEURAL PRN
Status: DISCONTINUED | OUTPATIENT
Start: 2023-03-28 | End: 2023-03-28 | Stop reason: SDUPTHER

## 2023-03-28 RX ORDER — SODIUM CHLORIDE 0.9 % (FLUSH) 0.9 %
5-40 SYRINGE (ML) INJECTION EVERY 12 HOURS SCHEDULED
Status: DISCONTINUED | OUTPATIENT
Start: 2023-03-28 | End: 2023-03-28 | Stop reason: HOSPADM

## 2023-03-28 RX ORDER — SODIUM CHLORIDE 0.9 % (FLUSH) 0.9 %
5-40 SYRINGE (ML) INJECTION PRN
Status: DISCONTINUED | OUTPATIENT
Start: 2023-03-28 | End: 2023-03-28 | Stop reason: HOSPADM

## 2023-03-28 RX ORDER — SODIUM CHLORIDE 9 MG/ML
INJECTION, SOLUTION INTRAVENOUS PRN
Status: CANCELLED | OUTPATIENT
Start: 2023-03-28

## 2023-03-28 RX ORDER — SODIUM CHLORIDE 0.9 % (FLUSH) 0.9 %
5-40 SYRINGE (ML) INJECTION EVERY 12 HOURS SCHEDULED
Status: CANCELLED | OUTPATIENT
Start: 2023-03-28

## 2023-03-28 RX ORDER — MAGNESIUM HYDROXIDE 1200 MG/15ML
LIQUID ORAL CONTINUOUS PRN
Status: COMPLETED | OUTPATIENT
Start: 2023-03-28 | End: 2023-03-28

## 2023-03-28 RX ORDER — SODIUM CHLORIDE, SODIUM LACTATE, POTASSIUM CHLORIDE, CALCIUM CHLORIDE 600; 310; 30; 20 MG/100ML; MG/100ML; MG/100ML; MG/100ML
INJECTION, SOLUTION INTRAVENOUS CONTINUOUS
Status: DISCONTINUED | OUTPATIENT
Start: 2023-03-28 | End: 2023-03-28 | Stop reason: HOSPADM

## 2023-03-28 RX ORDER — SODIUM CHLORIDE 0.9 % (FLUSH) 0.9 %
5-40 SYRINGE (ML) INJECTION PRN
Status: CANCELLED | OUTPATIENT
Start: 2023-03-28

## 2023-03-28 RX ORDER — LIDOCAINE HYDROCHLORIDE 10 MG/ML
1 INJECTION, SOLUTION EPIDURAL; INFILTRATION; INTRACAUDAL; PERINEURAL
Status: DISCONTINUED | OUTPATIENT
Start: 2023-03-28 | End: 2023-03-28 | Stop reason: HOSPADM

## 2023-03-28 RX ORDER — ACETAMINOPHEN 500 MG
1000 TABLET ORAL
Status: CANCELLED | OUTPATIENT
Start: 2023-03-28 | End: 2023-03-29

## 2023-03-28 RX ORDER — DROPERIDOL 2.5 MG/ML
0.62 INJECTION, SOLUTION INTRAMUSCULAR; INTRAVENOUS
Status: CANCELLED | OUTPATIENT
Start: 2023-03-28 | End: 2023-03-29

## 2023-03-28 RX ORDER — PROPOFOL 10 MG/ML
INJECTION, EMULSION INTRAVENOUS PRN
Status: DISCONTINUED | OUTPATIENT
Start: 2023-03-28 | End: 2023-03-28 | Stop reason: SDUPTHER

## 2023-03-28 RX ORDER — SODIUM CHLORIDE 9 MG/ML
INJECTION, SOLUTION INTRAVENOUS PRN
Status: DISCONTINUED | OUTPATIENT
Start: 2023-03-28 | End: 2023-03-28 | Stop reason: HOSPADM

## 2023-03-28 RX ADMIN — LIDOCAINE HYDROCHLORIDE 60 MG: 20 INJECTION, SOLUTION INFILTRATION; PERINEURAL at 12:52

## 2023-03-28 RX ADMIN — PROPOFOL 150 MG: 10 INJECTION, EMULSION INTRAVENOUS at 12:52

## 2023-03-28 ASSESSMENT — PAIN SCALES - GENERAL
PAINLEVEL_OUTOF10: 0

## 2023-03-28 ASSESSMENT — PAIN - FUNCTIONAL ASSESSMENT: PAIN_FUNCTIONAL_ASSESSMENT: NONE - DENIES PAIN

## 2023-03-28 NOTE — ANESTHESIA PRE PROCEDURE
Cristy Edwards,         lidocaine PF 2 % in hylenex   IntrOCUlar Once Kinjal Merrill MD        epinephrine and lidocaine 2% PF in BSS injection (1 mL)   IntrOCUlar Once Kinjal Merrill MD           Allergies: Allergies   Allergen Reactions    Promethazine Hcl     Zofran [Ondansetron Hcl]     Ondansetron Hcl     Morphine      \"itching\"  \"itching\"         Problem List:    Patient Active Problem List   Diagnosis Code    HTN (hypertension) I10    HLD (hyperlipidemia) E78.5    Abnormal EKG R94.31    Primary localized osteoarthrosis, lower leg M17.10    Pneumonia J18.9    Hypoxia R09.02    Diarrhea due to COVID-19 U07.1, A08.39    Carpal tunnel syndrome of right wrist G56.01       Past Medical History:        Diagnosis Date    Anesthesia complication     \" thrashed about\"    Arthritis     Cancer ([address]     left breast    COVID-19 12/01/2020    Hyperlipidemia     Hypertension     S/P chemotherapy, time since greater than 12 weeks     Wears dentures        Past Surgical History:        Procedure Laterality Date    BREAST SURGERY Left     mastectomy    BREAST SURGERY Bilateral     implant    CATARACT EXTRACTION W/ INTRAOCULAR LENS IMPLANT Right     CHOLECYSTECTOMY      COLONOSCOPY      HEMORRHOID SURGERY      HYSTERECTOMY (CERVIX STATUS UNKNOWN)      INTRACAPSULAR CATARACT EXTRACTION Left 12/12/2019    PHACO EMULSIFICATION OF CATARACT WITH INTRAOCULAR LENS IMPLANT LEFT EYE performed by Kinjal Merrill MD at [address] [place] [address] Drive Left     TOTAL KNEE ARTHROPLASTY Left 11/08/2013       Social History:    Social History     Tobacco Use    Smoking status: Never    Smokeless tobacco: Never   Substance Use Topics    Alcohol use: No                                Counseling given: Not Answered      Vital Signs (Current): There were no vitals filed for this visit.                                            BP Readings from Last 3 Encounters:   03/28/23 125/65   11/11/22 125/67

## 2023-03-28 NOTE — ANESTHESIA POSTPROCEDURE EVALUATION
Department of Anesthesiology  Postprocedure Note    Patient: Chet Jimenes  MRN: [id number]  YOB: 1940  Date of evaluation: 3/28/2023      Procedure Summary     Date: 03/28/23 [facility] [address] [facility]    Anesthesia Start: 1250 Anesthesia Stop: 4312    Procedure: RIGHT CARPAL TUNNEL RELEASE (Right: Wrist) Diagnosis:       Right carpal tunnel syndrome      (Right carpal tunnel syndrome [G56.01])    Surgeons: Leigh Ann David MD Responsible Provider: Jinny Marques MD    Anesthesia Type: MAC ASA Status: 2          Anesthesia Type: No value filed.     Micheline Phase I:      Micheline Phase II: Micheline Score: 10      Anesthesia Post Evaluation    Patient location during evaluation: PACU  Patient participation: complete - patient participated  Level of consciousness: awake and alert  Airway patency: patent  Nausea & Vomiting: no nausea and no vomiting  Complications: no  Cardiovascular status: hemodynamically stable  Respiratory status: acceptable, room air and spontaneous ventilation  Hydration status: stable  Comments: --------------------            03/28/23               1340     --------------------   BP:     (!) 134/56   Pulse:      68       Resp:       15       Temp:                SpO2:      99%      --------------------

## 2023-03-28 NOTE — H&P
Pre-operative Update of H&P:    I  have seen & examined Ms. Adriana Izquierdo related solely to her hand and upper extremity conditions, prior to the scheduled procedure on the date of her surgery. The indications for the planned surgical procedure & and her upper-extremity condition are unchanged.

## 2023-03-28 NOTE — OP NOTE
direct visualization. The contents of the carpal tunnel were inspected and found to be free of mass, lesion or other abnormality. Digital palpation revealed no further constriction about the median nerve. The wound was irrigated copiously with sterile saline for irrigation and the pneumotourniquet was deflated after a period of 2 minutes elevation. The fingers were immediately pink & well perfused. Hemostasis was easily obtained with direct pressure and electrocautery and the wound was closed with interrupted sutures. The wound was dressed with adaptic, dry sterile dressings and a bulky soft hand & wrist dressing was applied. Ms. Basim Kohli  was awakened from light sedation, having tolerated the procedure without apparent complication. She  was returned to the recovery room in stable condition. At the conclusion of the procedure all needle, instrument, and sponge counts were correct. Evelyne Heard MD   3/28/2023, 12:49 PM

## 2023-03-28 NOTE — PROGRESS NOTES
Pt appropriate for discharge per hospital policy     Pt ambulatory to car without event - belongings sent home with pt

## 2023-03-28 NOTE — DISCHARGE INSTRUCTIONS
at:  [email or web address]    Hospital or office staff may NOT accept any medications to drop off in the cabinet for you. What is a Surgical Site Infection or  (SSI)? A surgical site infection (SSI) is an infection that occurs after surgery in the part of the body where the surgery took place. Most patients who have surgery do not develop an infection. However, infections can develop in about 1-3 cases for every 100 patients who have had surgery. Our goal is for you to NOT experience any complications and be completely satisfied with your care! However, some signs or symptoms to look for and report immediately to your doctor are:   1. Fever above 101 degrees    2. Redness and increasing pain around the area  where you had surgery   3. Drainage of cloudy fluid or pus coming from the surgical area    Some of the things we/ you can do to prevent SSI's are:   1. Clean hands with soap and water or an alcohol-based hand rub before and after caring for the operative area. This occurs the day of surgery and for the next 2 weeks. 2.Sometimes you receive an appropriate antibiotic within 60 minutes before your surgery or take one for several days after surgery depending on your surgeon's instructions and/or the type of surgery you are having. 3. Family and/or friends who visit you should NOT touch the surgical wound or dressings until advised by your surgeon. 4. Be sure to elevate and decrease the swelling after your surgery to help prevent infection. 5. If you are a diabetic, you need to closely monitor your blood sugar levels and report any significant increases or changes to your surgeon to help promote the healing process.

## 2023-04-05 ENCOUNTER — OFFICE VISIT (OUTPATIENT)
Dept: ORTHOPEDIC SURGERY | Age: 83
End: 2023-04-05

## 2023-04-05 VITALS — HEIGHT: 66 IN | BODY MASS INDEX: 21.69 KG/M2 | WEIGHT: 135 LBS | RESPIRATION RATE: 16 BRPM

## 2023-04-05 DIAGNOSIS — G56.01 CARPAL TUNNEL SYNDROME OF RIGHT WRIST: Primary | ICD-10-CM

## 2023-04-05 PROCEDURE — 99024 POSTOP FOLLOW-UP VISIT: CPT | Performed by: ORTHOPAEDIC SURGERY

## 2023-04-05 NOTE — PATIENT INSTRUCTIONS
Postoperative Instructions After Carpal Tunnel Release    Dr. Jesus Cr. Omar        After bandages are removed one week from surgery, you may chose to wear a small bandage over the incision if you wish, though you do not need to. Keep incision dry until sutures have fully dissolved  or it has been 12 - 14 days since your surgery. Thereafter, you may wash with mild soap and water and shower normally. Work hard on motion of the fingers and wrist, straightening each finger fully and bending each finger fully, bending wrist forward and bending wrist backwards. Do not be concerned if you experience discomfort. This will not damage the surgery. You may begin using the hand as it feels comfortable beginning 12 - 14 days from the day of surgery. You may not feel entirely comfortable gripping or lifting heavy objects for several weeks. You may expect to see some skin peel off around the incision. You may be left with a small area of pink baby skin. This is quite normal.  6. Once your stiches have fully disappeared & skin appears normal, you should begin gently massaging the incision with Vitamin E (may use Vitamin E lotion or contents of Vitamin E capsule). Thank you for choosing [facility]) Physicians for your Hand and Upper Extremity needs. If we can be of any further assistance to you, please do not hesitate to contact us.     Office Phone Number:  [phone]  or  [phone]

## 2023-04-05 NOTE — PROGRESS NOTES
Ms. Nunu Marcos returns today in follow-up of her recent right Carpal Tunnel Release done approximately 1 week ago. She has done well noting mild discomfort and no other reported complications. She notes pre-operative symptoms to be not changed at this time. Physical Exam:  Bandage intact and well cared for  Skin incision is healing well, without erythema, drainage or sign of infection. Digital range of motion is without significant limitation. Wrist range of motion is without significant limitation. Sensation is not changed from preoperatvely  Vascular examination reveals normal, good capillary refill, and good color. Swelling is minimal.  Sensory and Motor Median Nerve function is intact. Impression:  Ms. Nunu Marcos is doing well after recent right Carpal Tunnel Release. Plan:  Ms. Nunu Marcos is instructed in work on Active & Passive range of motion of the digits, wrist, & elbow. These modalities were specifically demonstrated to her today. We discussed the appropriateness of gradual resumption of use of the operated hand and the return to normal use as comfort allows. She is given instructions regarding management of the fresh surgical incision and progressive use of desensitization and tissue massage techniques. We discussed the appropriate expectations and timeline for symptom improvement. She is provided a written patient instruction sheet titled: Postoperative Instructions After Carpal Tunnel Release. I have asked Ms. Adriana Izquierdo to follow-up with me or contact me by telephone over the next 2-4 weeks if her symptoms have not fully resolved or if she has not regained full & painless return of function. She is also specifically instructed to return to the office or call for an appointment sooner if her symptoms are changing or worsening prior to that time.

## 2023-11-17 ENCOUNTER — HOSPITAL ENCOUNTER (OUTPATIENT)
Age: 83
Discharge: HOME | End: 2023-11-17
Payer: MEDICARE

## 2023-11-17 LAB — C DIFF TOX A+B STL QL IA: NORMAL

## 2023-11-17 PROCEDURE — 87506 IADNA-DNA/RNA PROBE TQ 6-11: CPT

## 2023-11-17 PROCEDURE — 82653 EL-1 FECAL QUANTITATIVE: CPT

## 2023-11-17 PROCEDURE — 87324 CLOSTRIDIUM AG IA: CPT

## 2023-11-17 PROCEDURE — 83993 ASSAY FOR CALPROTECTIN FECAL: CPT

## 2023-11-17 PROCEDURE — 82705 FATS/LIPIDS FECES QUAL: CPT

## 2023-11-17 PROCEDURE — 87328 CRYPTOSPORIDIUM AG IA: CPT

## 2023-11-17 PROCEDURE — 87449 NOS EACH ORGANISM AG IA: CPT

## 2023-11-17 PROCEDURE — 87336 ENTAMOEB HIST DISPR AG IA: CPT

## 2023-11-18 LAB
CRYPTOSP AG STL QL IA: NORMAL
E HISTOLYT AG STL QL IA: NORMAL
G LAMBLIA AG STL QL IA: NORMAL
GI PATHOGENS PNL STL NAA+PROBE: NORMAL

## 2023-11-19 LAB
FAT STL QL: NORMAL
NEUTRAL FAT STL QL: NORMAL

## 2023-11-20 LAB — CALPROTECTIN STL-MCNT: 16 UG/G

## 2023-11-21 LAB — ELASTASE PANC STL-MCNT: 276 UG/G

## 2024-06-04 ENCOUNTER — HOSPITAL ENCOUNTER (EMERGENCY)
Age: 84
Discharge: HOME | End: 2024-06-04
Attending: EMERGENCY MEDICINE
Payer: MEDICARE

## 2024-06-04 ENCOUNTER — APPOINTMENT (OUTPATIENT)
Dept: GENERAL RADIOLOGY | Age: 84
End: 2024-06-04
Payer: MEDICARE

## 2024-06-04 VITALS
RESPIRATION RATE: 18 BRPM | OXYGEN SATURATION: 100 % | TEMPERATURE: 98.5 F | DIASTOLIC BLOOD PRESSURE: 68 MMHG | SYSTOLIC BLOOD PRESSURE: 122 MMHG | BODY MASS INDEX: 20.97 KG/M2 | WEIGHT: 133.6 LBS | HEIGHT: 67 IN | HEART RATE: 80 BPM

## 2024-06-04 DIAGNOSIS — R07.89 RIGHT-SIDED CHEST WALL PAIN: Primary | ICD-10-CM

## 2024-06-04 LAB
BILIRUB UR QL STRIP.AUTO: NEGATIVE
CLARITY UR: CLEAR
COLOR UR: YELLOW
EPI CELLS #/AREA URNS HPF: NORMAL /HPF (ref 0–5)
GLUCOSE UR STRIP.AUTO-MCNC: NEGATIVE MG/DL
HGB UR QL STRIP.AUTO: ABNORMAL
KETONES UR STRIP.AUTO-MCNC: NEGATIVE MG/DL
LEUKOCYTE ESTERASE UR QL STRIP.AUTO: ABNORMAL
NITRITE UR QL STRIP.AUTO: NEGATIVE
PH UR STRIP.AUTO: 7.5 [PH] (ref 5–8)
PROT UR STRIP.AUTO-MCNC: NEGATIVE MG/DL
RBC #/AREA URNS HPF: NORMAL /HPF (ref 0–4)
SP GR UR STRIP.AUTO: 1.01 (ref 1–1.03)
UA COMPLETE W REFLEX CULTURE PNL UR: ABNORMAL
UA DIPSTICK W REFLEX MICRO PNL UR: YES
URN SPEC COLLECT METH UR: ABNORMAL
UROBILINOGEN UR STRIP-ACNC: 0.2 E.U./DL
WBC #/AREA URNS HPF: NORMAL /HPF (ref 0–5)

## 2024-06-04 PROCEDURE — 99284 EMERGENCY DEPT VISIT MOD MDM: CPT

## 2024-06-04 PROCEDURE — 81001 URINALYSIS AUTO W/SCOPE: CPT

## 2024-06-04 PROCEDURE — 6370000000 HC RX 637 (ALT 250 FOR IP): Performed by: EMERGENCY MEDICINE

## 2024-06-04 PROCEDURE — 72070 X-RAY EXAM THORAC SPINE 2VWS: CPT

## 2024-06-04 PROCEDURE — 71101 X-RAY EXAM UNILAT RIBS/CHEST: CPT

## 2024-06-04 RX ORDER — HYDROCODONE BITARTRATE AND ACETAMINOPHEN 5; 325 MG/1; MG/1
1 TABLET ORAL EVERY 6 HOURS PRN
Qty: 12 TABLET | Refills: 0 | Status: SHIPPED | OUTPATIENT
Start: 2024-06-04 | End: 2024-06-07

## 2024-06-04 RX ORDER — TRAMADOL HYDROCHLORIDE 50 MG/1
50 TABLET ORAL ONCE
Status: COMPLETED | OUTPATIENT
Start: 2024-06-04 | End: 2024-06-04

## 2024-06-04 RX ORDER — METHOCARBAMOL 500 MG/1
500 TABLET, FILM COATED ORAL ONCE
Status: COMPLETED | OUTPATIENT
Start: 2024-06-04 | End: 2024-06-04

## 2024-06-04 RX ORDER — METHOCARBAMOL 750 MG/1
750 TABLET, FILM COATED ORAL 4 TIMES DAILY PRN
Qty: 20 TABLET | Refills: 0 | Status: SHIPPED | OUTPATIENT
Start: 2024-06-04 | End: 2024-06-09

## 2024-06-04 RX ADMIN — METHOCARBAMOL TABLETS 500 MG: 500 TABLET, COATED ORAL at 12:35

## 2024-06-04 RX ADMIN — TRAMADOL HYDROCHLORIDE 50 MG: 50 TABLET ORAL at 12:36

## 2024-06-04 ASSESSMENT — PAIN DESCRIPTION - ORIENTATION: ORIENTATION: RIGHT;LEFT;MID;LOWER

## 2024-06-04 ASSESSMENT — PAIN DESCRIPTION - DESCRIPTORS: DESCRIPTORS: SPASM

## 2024-06-04 ASSESSMENT — PAIN SCALES - GENERAL: PAINLEVEL_OUTOF10: 8

## 2024-06-04 ASSESSMENT — LIFESTYLE VARIABLES
HOW MANY STANDARD DRINKS CONTAINING ALCOHOL DO YOU HAVE ON A TYPICAL DAY: PATIENT DOES NOT DRINK
HOW OFTEN DO YOU HAVE A DRINK CONTAINING ALCOHOL: NEVER

## 2024-06-04 ASSESSMENT — PAIN - FUNCTIONAL ASSESSMENT
PAIN_FUNCTIONAL_ASSESSMENT: NONE - DENIES PAIN
PAIN_FUNCTIONAL_ASSESSMENT: 0-10

## 2024-06-04 ASSESSMENT — PAIN DESCRIPTION - LOCATION: LOCATION: BACK

## 2024-06-04 ASSESSMENT — PAIN DESCRIPTION - FREQUENCY: FREQUENCY: INTERMITTENT

## 2024-06-04 NOTE — DISCHARGE INSTRUCTIONS
Ice area to reduce pain and inflammation.  Muscle relaxants and pain medicine as needed.  If symptoms do not improve within 3 to 5 days follow-up with your family doctor for reevaluation.

## 2024-06-04 NOTE — ED PROVIDER NOTES
CC:   Chief Complaint   Patient presents with    Muscle Pain     C/o mid to lower back pain and muscle spasms to her back after picking up sticks for several days in her yard        HPI:  Adriana is a 83 y.o. female with a history of hyperlipidemia, hypertension arthritis who presents to the emergency department with complaints of mid back pain with radiation around the right side of her chest.  She describes having waxing and waning spasm-like pain.  No shortness of breath no anterior chest pain.  No fever or chills.  Symptoms began after she a great deal of bending in the yard picking up greater than 200 sticks [place].  No numbness tingling or weakness.  No urinary symptoms.  No palpitations or shortness of breath.  No calf or leg pain.  Pain is reproduced with movement.  History obtained via the patient    External records reviewed    ROS:  All Pertinent ROS Negative Unless otherwise stated within HPI.    VITALS:  Vitals:    06/04/24 1220   BP: 135/76   Pulse: 76   Resp: 20   Temp: 98.4 °F (36.9 °C)   SpO2: 100%        PHYSICAL EXAM:      Vital signs reviewed  General:  Patient appeared in no distress  Vitals:  Vital signs reviewed, see nurses notes  Neck:  No JVD, or lymphadenopathy.  Cardiovascular:  Regular rhythm, Normal sounds and absence of murmurs, rubs or gallops.  Chest wall: Tenderness to palpation over the right lateral chest wall palpation and movement reproduce her pain.  No swelling or ecchymoses no rash.  Lungs:  Clear to auscultation without rales, ronchi, or wheezing.  Abdomen:  Soft, unremarkable and without evidence of organomegally, masses, or abdominal aortic enlargement, No guarding.  Extremities:  Non-edematous and Normal distal pulses.  Neuro:  Nonfocal and Normal motor and sensation.   Skin exam: No rash      LABS/IMAGING:  Reviewed  XR THORACIC SPINE (2 VIEWS)   Final Result   Degenerative changes in the thoracic spine with no acute fracture or   malalignment.         XR

## 2024-10-22 ENCOUNTER — APPOINTMENT (OUTPATIENT)
Dept: GENERAL RADIOLOGY | Age: 84
End: 2024-10-22
Payer: MEDICARE

## 2024-10-22 ENCOUNTER — HOSPITAL ENCOUNTER (EMERGENCY)
Age: 84
Discharge: HOME | End: 2024-10-22
Attending: EMERGENCY MEDICINE
Payer: MEDICARE

## 2024-10-22 VITALS
OXYGEN SATURATION: 96 % | RESPIRATION RATE: 16 BRPM | WEIGHT: 135.4 LBS | BODY MASS INDEX: 21.21 KG/M2 | HEART RATE: 90 BPM | DIASTOLIC BLOOD PRESSURE: 62 MMHG | SYSTOLIC BLOOD PRESSURE: 140 MMHG | TEMPERATURE: 97.6 F

## 2024-10-22 DIAGNOSIS — M70.21 OLECRANON BURSITIS OF RIGHT ELBOW: Primary | ICD-10-CM

## 2024-10-22 LAB
ALBUMIN SERPL-MCNC: 4.1 G/DL (ref 3.4–5)
ALBUMIN/GLOB SERPL: 1.3 {RATIO} (ref 1.1–2.2)
ALP SERPL-CCNC: 73 U/L (ref 40–129)
ALT SERPL-CCNC: 17 U/L (ref 10–40)
ANION GAP SERPL CALCULATED.3IONS-SCNC: 10 MMOL/L (ref 3–16)
AST SERPL-CCNC: 20 U/L (ref 15–37)
BASOPHILS # BLD: 0 K/UL (ref 0–0.2)
BASOPHILS NFR BLD: 0.3 %
BILIRUB SERPL-MCNC: 0.6 MG/DL (ref 0–1)
BUN SERPL-MCNC: 21 MG/DL (ref 7–20)
CALCIUM SERPL-MCNC: 10.1 MG/DL (ref 8.3–10.6)
CHLORIDE SERPL-SCNC: 101 MMOL/L (ref 99–110)
CK SERPL-CCNC: 99 U/L (ref 26–192)
CO2 SERPL-SCNC: 26 MMOL/L (ref 21–32)
CREAT SERPL-MCNC: 1 MG/DL (ref 0.6–1.2)
DEPRECATED RDW RBC AUTO: 12.5 % (ref 12.4–15.4)
EOSINOPHIL # BLD: 0 K/UL (ref 0–0.6)
EOSINOPHIL NFR BLD: 0.4 %
GFR SERPLBLD CREATININE-BSD FMLA CKD-EPI: 55 ML/MIN/{1.73_M2}
GLUCOSE SERPL-MCNC: 119 MG/DL (ref 70–99)
HCT VFR BLD AUTO: 37.2 % (ref 36–48)
HGB BLD-MCNC: 12.2 G/DL (ref 12–16)
LACTATE BLDV-SCNC: 0.8 MMOL/L (ref 0.4–1.9)
LYMPHOCYTES # BLD: 0.8 K/UL (ref 1–5.1)
LYMPHOCYTES NFR BLD: 9.2 %
MCH RBC QN AUTO: 30.6 PG (ref 26–34)
MCHC RBC AUTO-ENTMCNC: 32.8 G/DL (ref 31–36)
MCV RBC AUTO: 93.3 FL (ref 80–100)
MONOCYTES # BLD: 0.9 K/UL (ref 0–1.3)
MONOCYTES NFR BLD: 10.1 %
NEUTROPHILS # BLD: 7 K/UL (ref 1.7–7.7)
NEUTROPHILS NFR BLD: 80 %
PLATELET # BLD AUTO: 315 K/UL (ref 135–450)
PMV BLD AUTO: 6.8 FL (ref 5–10.5)
POTASSIUM SERPL-SCNC: 4.2 MMOL/L (ref 3.5–5.1)
PROT SERPL-MCNC: 7.2 G/DL (ref 6.4–8.2)
RBC # BLD AUTO: 3.99 M/UL (ref 4–5.2)
SODIUM SERPL-SCNC: 137 MMOL/L (ref 136–145)
WBC # BLD AUTO: 8.7 K/UL (ref 4–11)

## 2024-10-22 PROCEDURE — 36415 COLL VENOUS BLD VENIPUNCTURE: CPT

## 2024-10-22 PROCEDURE — 85025 COMPLETE CBC W/AUTO DIFF WBC: CPT

## 2024-10-22 PROCEDURE — 83605 ASSAY OF LACTIC ACID: CPT

## 2024-10-22 PROCEDURE — 80053 COMPREHEN METABOLIC PANEL: CPT

## 2024-10-22 PROCEDURE — 82550 ASSAY OF CK (CPK): CPT

## 2024-10-22 PROCEDURE — 73080 X-RAY EXAM OF ELBOW: CPT

## 2024-10-22 PROCEDURE — 99284 EMERGENCY DEPT VISIT MOD MDM: CPT

## 2024-10-22 PROCEDURE — 6370000000 HC RX 637 (ALT 250 FOR IP): Performed by: EMERGENCY MEDICINE

## 2024-10-22 RX ORDER — OXYCODONE AND ACETAMINOPHEN 5; 325 MG/1; MG/1
1 TABLET ORAL ONCE
Status: COMPLETED | OUTPATIENT
Start: 2024-10-22 | End: 2024-10-22

## 2024-10-22 RX ORDER — CLINDAMYCIN HYDROCHLORIDE 150 MG/1
450 CAPSULE ORAL 3 TIMES DAILY
Qty: 90 CAPSULE | Refills: 0 | Status: SHIPPED | OUTPATIENT
Start: 2024-10-22 | End: 2024-11-01

## 2024-10-22 RX ADMIN — OXYCODONE HYDROCHLORIDE AND ACETAMINOPHEN 1 TABLET: 5; 325 TABLET ORAL at 12:05

## 2024-10-22 ASSESSMENT — PAIN SCALES - GENERAL
PAINLEVEL_OUTOF10: 5
PAINLEVEL_OUTOF10: 5

## 2024-10-22 ASSESSMENT — PAIN DESCRIPTION - LOCATION
LOCATION: ARM
LOCATION: ELBOW

## 2024-10-22 ASSESSMENT — PAIN DESCRIPTION - ORIENTATION
ORIENTATION: RIGHT
ORIENTATION: RIGHT

## 2024-10-22 ASSESSMENT — PAIN DESCRIPTION - FREQUENCY: FREQUENCY: CONTINUOUS

## 2024-10-22 ASSESSMENT — PAIN - FUNCTIONAL ASSESSMENT: PAIN_FUNCTIONAL_ASSESSMENT: 0-10

## 2024-10-22 ASSESSMENT — PAIN DESCRIPTION - DESCRIPTORS: DESCRIPTORS: OTHER (COMMENT)

## 2024-10-22 ASSESSMENT — LIFESTYLE VARIABLES
HOW OFTEN DO YOU HAVE A DRINK CONTAINING ALCOHOL: NEVER
HOW MANY STANDARD DRINKS CONTAINING ALCOHOL DO YOU HAVE ON A TYPICAL DAY: PATIENT DOES NOT DRINK

## 2024-10-22 ASSESSMENT — PAIN DESCRIPTION - PAIN TYPE: TYPE: ACUTE PAIN

## 2024-10-22 NOTE — ED TRIAGE NOTES
Pt states that she slipped in gravel three days ago, hitting hre RT arm. She has redness, swelling and pain to the rt elbow. She rates pain 5/10

## 2024-10-22 NOTE — ED PROVIDER NOTES
MG TABLET    Take 0.5 tablets by mouth daily    B COMPLEX VITAMINS (VITAMIN-B COMPLEX PO)    Take 1 tablet by mouth daily.    CALCIUM CARBONATE (OSCAL) 500 MG TABS TABLET    Take 1 tablet by mouth daily    COENZYME Q10 (CO Q 10) 100 MG CAPS    Take 1 capsule by mouth daily     LISINOPRIL (PRINIVIL;ZESTRIL) 10 MG TABLET    Take 1 tablet by mouth daily    THERAPEUTIC MULTIVITAMIN-MINERALS (THERAGRAN-M) TABLET    Take 1 tablet by mouth daily       ALLERGIES     Promethazine hcl, Zofran [ondansetron hcl], Ondansetron hcl, and Morphine    FAMILYHISTORY       Family History   Problem Relation Age of Onset    Diabetes Mother     Cancer Father         SOCIAL HISTORY       Social History     Tobacco Use    Smoking status: Never    Smokeless tobacco: Never   Vaping Use    Vaping status: Never Used   Substance Use Topics    Alcohol use: No    Drug use: No       SCREENINGS        [place] Coma Scale  Eye Opening: Spontaneous  Best Verbal Response: Oriented  Best Motor Response: Obeys commands  [place] Coma Scale Score: 15                CIWA Assessment  BP: (!) 140/62  Pulse: 90           PHYSICAL EXAM  1 or more Elements     ED Triage Vitals   BP Systolic BP Percentile Diastolic BP Percentile Temp Temp src Pulse Resp SpO2   -- -- -- -- -- -- -- --      Height Weight         -- --             Physical Exam    My pulse oximetry interpretation is which is within the normal range    Awake and alert.  No acute distress.  Ambulates with a steady gait.  Right elbow shows full extension and flexion.  Small amount of swelling to the olecranon area noted with fluid and overlying erythema and redness.  Redness goes about 2 inches distally down the forearm and does not go up the humerus good radial pulse.  Normal sensation in the fingers.  No circumferential redness.  No redness on the anterior surface of the elbow.  Small superficial abrasion noted with scab on the olecranon area        DIAGNOSTIC RESULTS   LABS:    Labs Reviewed   CBC

## 2024-11-02 ENCOUNTER — HOSPITAL ENCOUNTER (EMERGENCY)
Age: 84
Discharge: HOME | End: 2024-11-02
Attending: EMERGENCY MEDICINE
Payer: MEDICARE

## 2024-11-02 ENCOUNTER — APPOINTMENT (OUTPATIENT)
Dept: GENERAL RADIOLOGY | Age: 84
End: 2024-11-02
Payer: MEDICARE

## 2024-11-02 VITALS
HEART RATE: 84 BPM | TEMPERATURE: 98 F | RESPIRATION RATE: 16 BRPM | BODY MASS INDEX: 21.19 KG/M2 | WEIGHT: 135 LBS | OXYGEN SATURATION: 100 % | SYSTOLIC BLOOD PRESSURE: 132 MMHG | HEIGHT: 67 IN | DIASTOLIC BLOOD PRESSURE: 80 MMHG

## 2024-11-02 DIAGNOSIS — M70.21 OLECRANON BURSITIS OF RIGHT ELBOW: Primary | ICD-10-CM

## 2024-11-02 PROCEDURE — 99283 EMERGENCY DEPT VISIT LOW MDM: CPT

## 2024-11-02 PROCEDURE — 73080 X-RAY EXAM OF ELBOW: CPT

## 2024-11-02 RX ORDER — DOXYCYCLINE HYCLATE 100 MG
100 TABLET ORAL 2 TIMES DAILY
Qty: 14 TABLET | Refills: 0 | Status: SHIPPED | OUTPATIENT
Start: 2024-11-02 | End: 2024-11-09

## 2024-11-02 ASSESSMENT — PAIN - FUNCTIONAL ASSESSMENT
PAIN_FUNCTIONAL_ASSESSMENT: 0-10
PAIN_FUNCTIONAL_ASSESSMENT: ACTIVITIES ARE NOT PREVENTED

## 2024-11-02 ASSESSMENT — PAIN SCALES - GENERAL: PAINLEVEL_OUTOF10: 2

## 2024-11-02 ASSESSMENT — PAIN DESCRIPTION - DESCRIPTORS: DESCRIPTORS: SORE

## 2024-11-02 ASSESSMENT — PAIN DESCRIPTION - PAIN TYPE: TYPE: ACUTE PAIN

## 2024-11-02 ASSESSMENT — PAIN DESCRIPTION - ORIENTATION: ORIENTATION: RIGHT

## 2024-11-02 ASSESSMENT — PAIN DESCRIPTION - LOCATION: LOCATION: ELBOW

## 2024-11-02 ASSESSMENT — PAIN DESCRIPTION - FREQUENCY: FREQUENCY: CONTINUOUS

## 2024-11-02 ASSESSMENT — PAIN DESCRIPTION - ONSET: ONSET: ON-GOING

## 2024-11-02 NOTE — ED PROVIDER NOTES
- No data to display    When ordered only abnormal lab results are displayed. All other labs were within normal range or not returned as of this dictation.    EKG:     RADIOLOGY:   Non-plain film images such as CT, Ultrasound and MRI are read by the radiologist. Plain radiographic images are visualized and preliminarily interpreted by the ED Provider with the below findings:        Interpretation per the Radiologist below, if available at the time of this note:    XR ELBOW RIGHT (MIN 3 VIEWS)   Preliminary Result   Mild soft tissue swelling without acute osseous abnormality.           XR ELBOW RIGHT (MIN 3 VIEWS)    Result Date: 11/2/2024  EXAMINATION: THREE XRAY VIEWS OF THE RIGHT ELBOW 11/2/2024 1:15 pm COMPARISON: 10/22/2024 HISTORY: ORDERING SYSTEM PROVIDED HISTORY: ebow pain, fall two weeks ago with redness still present TECHNOLOGIST PROVIDED HISTORY: Reason for exam:->ebow pain, fall two weeks ago with redness still present Reason for Exam: fall,cellulitis FINDINGS: Mild soft tissue swelling.  Otherwise limited evaluation for joint effusion given suboptimal lateral view.  No acute fracture or dislocation.  Joint spaces and alignment are otherwise maintained.     Mild soft tissue swelling without acute osseous abnormality.       No results found.    PROCEDURES   Unless otherwise noted below, none     Procedures    CRITICAL CARE TIME       EMERGENCY DEPARTMENT COURSE and DIFFERENTIAL DIAGNOSIS/MDM:   Vitals:    Vitals:    11/02/24 1242 11/02/24 1421   BP: 139/61    Pulse: 90    Resp: 16    Temp: 98 °F (36.7 °C)    TempSrc: Oral    SpO2: 100%    Weight:  61.2 kg (135 lb)   Height: 1.702 m (5' 7\")        Patient was given the following medications:  Medications - No data to display         Is this patient to be included in the SEP-1 Core Measure due to severe sepsis or septic shock?       PAST MEDICAL HISTORY      has a past medical history of Anesthesia complication, Arthritis, Cancer (HCC), COVID-19  right elbow          DISPOSITION/PLAN     DISPOSITION Decision To Discharge 11/02/2024 02:22:02 PM           PATIENT REFERRED TO:  Aura Kim MD  [address] [place], [place] [id number]  [phone]    Schedule an appointment as soon as possible for a visit in 2 days        DISCHARGE MEDICATIONS:  New Prescriptions    DOXYCYCLINE HYCLATE (VIBRA-TABS) 100 MG TABLET    Take 1 tablet by mouth 2 times daily for 7 days       DISCONTINUED MEDICATIONS:  Discontinued Medications    No medications on file              (Please note that portions of this note were completed with a voice recognition program.  Efforts were made to edit the dictations but occasionally words are mis-transcribed.)    Sara Calix MD (electronically signed)            Sara Calix MD  11/02/24 0877

## 2024-11-02 NOTE — ED TRIAGE NOTES
Patient was seen 1.5 weeks ago for right elbow infection. States she completed prescribed abx and followed up with her PCP who prescribed Keflex. The patient states that she took two of the Keflex, but thought her tongue felt \"weird\" afterwards and hasn't taken any more. Right elbow with erythema, edema, and heat; no open wound or drainage. Patient denies fever/chills.

## 2025-03-10 ENCOUNTER — HOSPITAL ENCOUNTER (OUTPATIENT)
Age: 85
Discharge: HOME | End: 2025-03-10
Payer: MEDICARE

## 2025-03-10 LAB
ALBUMIN SERPL-MCNC: 4.3 G/DL (ref 3.4–5)
ALBUMIN/GLOB SERPL: 1.6 {RATIO} (ref 1.1–2.2)
ALP SERPL-CCNC: 75 U/L (ref 40–129)
ALT SERPL-CCNC: 29 U/L (ref 10–40)
ANION GAP SERPL CALCULATED.3IONS-SCNC: 10 MMOL/L (ref 3–16)
AST SERPL-CCNC: 30 U/L (ref 15–37)
BILIRUB SERPL-MCNC: 0.5 MG/DL (ref 0–1)
BUN SERPL-MCNC: 21 MG/DL (ref 7–20)
CALCIUM SERPL-MCNC: 9.7 MG/DL (ref 8.3–10.6)
CHLORIDE SERPL-SCNC: 103 MMOL/L (ref 99–110)
CHOLEST SERPL-MCNC: 161 MG/DL (ref 0–199)
CO2 SERPL-SCNC: 26 MMOL/L (ref 21–32)
CREAT SERPL-MCNC: 1.2 MG/DL (ref 0.6–1.2)
DEPRECATED RDW RBC AUTO: 12.8 % (ref 12.4–15.4)
EST. AVERAGE GLUCOSE BLD GHB EST-MCNC: 119.8 MG/DL
GFR SERPLBLD CREATININE-BSD FMLA CKD-EPI: 44 ML/MIN/{1.73_M2}
GLUCOSE SERPL-MCNC: 100 MG/DL (ref 70–99)
HBA1C MFR BLD: 5.8 %
HCT VFR BLD AUTO: 37.2 % (ref 36–48)
HDLC SERPL-MCNC: 74 MG/DL (ref 40–60)
HGB BLD-MCNC: 12.6 G/DL (ref 12–16)
LDLC SERPL CALC-MCNC: 66 MG/DL
MCH RBC QN AUTO: 31.9 PG (ref 26–34)
MCHC RBC AUTO-ENTMCNC: 34 G/DL (ref 31–36)
MCV RBC AUTO: 93.8 FL (ref 80–100)
PLATELET # BLD AUTO: 328 K/UL (ref 135–450)
PMV BLD AUTO: 6.8 FL (ref 5–10.5)
POTASSIUM SERPL-SCNC: 4.4 MMOL/L (ref 3.5–5.1)
PROT SERPL-MCNC: 7 G/DL (ref 6.4–8.2)
RBC # BLD AUTO: 3.96 M/UL (ref 4–5.2)
SODIUM SERPL-SCNC: 139 MMOL/L (ref 136–145)
TRIGL SERPL-MCNC: 103 MG/DL (ref 0–150)
VLDLC SERPL CALC-MCNC: 21 MG/DL
WBC # BLD AUTO: 5.2 K/UL (ref 4–11)

## 2025-03-10 PROCEDURE — 36415 COLL VENOUS BLD VENIPUNCTURE: CPT

## 2025-03-10 PROCEDURE — 83036 HEMOGLOBIN GLYCOSYLATED A1C: CPT

## 2025-03-10 PROCEDURE — 80061 LIPID PANEL: CPT

## 2025-03-10 PROCEDURE — 85027 COMPLETE CBC AUTOMATED: CPT

## 2025-03-10 PROCEDURE — 80053 COMPREHEN METABOLIC PANEL: CPT

## 2025-05-25 ENCOUNTER — APPOINTMENT (OUTPATIENT)
Dept: GENERAL RADIOLOGY | Age: 85
DRG: 640 | End: 2025-05-25
Payer: MEDICARE

## 2025-05-25 ENCOUNTER — HOSPITAL ENCOUNTER (EMERGENCY)
Age: 85
Discharge: HOME | DRG: 640 | End: 2025-05-25
Attending: STUDENT IN AN ORGANIZED HEALTH CARE EDUCATION/TRAINING PROGRAM
Payer: MEDICARE

## 2025-05-25 VITALS
BODY MASS INDEX: 22.11 KG/M2 | WEIGHT: 132.7 LBS | DIASTOLIC BLOOD PRESSURE: 74 MMHG | HEIGHT: 65 IN | SYSTOLIC BLOOD PRESSURE: 138 MMHG | RESPIRATION RATE: 18 BRPM | TEMPERATURE: 98.7 F | OXYGEN SATURATION: 99 % | HEART RATE: 90 BPM

## 2025-05-25 DIAGNOSIS — R05.1 ACUTE COUGH: Primary | ICD-10-CM

## 2025-05-25 DIAGNOSIS — J18.9 COMMUNITY ACQUIRED PNEUMONIA OF RIGHT MIDDLE LOBE OF LUNG: ICD-10-CM

## 2025-05-25 LAB
ANION GAP SERPL CALCULATED.3IONS-SCNC: 11 MMOL/L (ref 3–16)
BASOPHILS # BLD: 0 K/UL (ref 0–0.2)
BASOPHILS NFR BLD: 0.7 %
BUN SERPL-MCNC: 18 MG/DL (ref 7–20)
CALCIUM SERPL-MCNC: 9.5 MG/DL (ref 8.3–10.6)
CHLORIDE SERPL-SCNC: 96 MMOL/L (ref 99–110)
CO2 SERPL-SCNC: 25 MMOL/L (ref 21–32)
CREAT SERPL-MCNC: 1.2 MG/DL (ref 0.6–1.2)
DEPRECATED RDW RBC AUTO: 12.5 % (ref 12.4–15.4)
EOSINOPHIL # BLD: 0 K/UL (ref 0–0.6)
EOSINOPHIL NFR BLD: 0.7 %
GFR SERPLBLD CREATININE-BSD FMLA CKD-EPI: 44 ML/MIN/{1.73_M2}
GLUCOSE SERPL-MCNC: 118 MG/DL (ref 70–99)
HCT VFR BLD AUTO: 38.5 % (ref 36–48)
HGB BLD-MCNC: 12.9 G/DL (ref 12–16)
LYMPHOCYTES # BLD: 0.5 K/UL (ref 1–5.1)
LYMPHOCYTES NFR BLD: 11.8 %
MCH RBC QN AUTO: 31.2 PG (ref 26–34)
MCHC RBC AUTO-ENTMCNC: 33.4 G/DL (ref 31–36)
MCV RBC AUTO: 93.3 FL (ref 80–100)
MONOCYTES # BLD: 0.7 K/UL (ref 0–1.3)
MONOCYTES NFR BLD: 16.1 %
NEUTROPHILS # BLD: 3.2 K/UL (ref 1.7–7.7)
NEUTROPHILS NFR BLD: 70.7 %
PLATELET # BLD AUTO: 236 K/UL (ref 135–450)
PMV BLD AUTO: 6.7 FL (ref 5–10.5)
POTASSIUM SERPL-SCNC: 4.8 MMOL/L (ref 3.5–5.1)
RBC # BLD AUTO: 4.12 M/UL (ref 4–5.2)
SODIUM SERPL-SCNC: 132 MMOL/L (ref 136–145)
WBC # BLD AUTO: 4.5 K/UL (ref 4–11)

## 2025-05-25 PROCEDURE — 6370000000 HC RX 637 (ALT 250 FOR IP): Performed by: STUDENT IN AN ORGANIZED HEALTH CARE EDUCATION/TRAINING PROGRAM

## 2025-05-25 PROCEDURE — 71046 X-RAY EXAM CHEST 2 VIEWS: CPT

## 2025-05-25 PROCEDURE — 36415 COLL VENOUS BLD VENIPUNCTURE: CPT

## 2025-05-25 PROCEDURE — 2580000003 HC RX 258: Performed by: STUDENT IN AN ORGANIZED HEALTH CARE EDUCATION/TRAINING PROGRAM

## 2025-05-25 PROCEDURE — 80048 BASIC METABOLIC PNL TOTAL CA: CPT

## 2025-05-25 PROCEDURE — 85025 COMPLETE CBC W/AUTO DIFF WBC: CPT

## 2025-05-25 RX ORDER — 0.9 % SODIUM CHLORIDE 0.9 %
500 INTRAVENOUS SOLUTION INTRAVENOUS ONCE
Status: COMPLETED | OUTPATIENT
Start: 2025-05-25 | End: 2025-05-25

## 2025-05-25 RX ORDER — DOXYCYCLINE HYCLATE 100 MG
100 TABLET ORAL 2 TIMES DAILY
Qty: 14 TABLET | Refills: 0 | Status: ON HOLD | OUTPATIENT
Start: 2025-05-25 | End: 2025-05-29 | Stop reason: HOSPADM

## 2025-05-25 RX ORDER — BENZONATATE 100 MG/1
100 CAPSULE ORAL ONCE
Status: COMPLETED | OUTPATIENT
Start: 2025-05-25 | End: 2025-05-25

## 2025-05-25 RX ORDER — IPRATROPIUM BROMIDE AND ALBUTEROL SULFATE 2.5; .5 MG/3ML; MG/3ML
1 SOLUTION RESPIRATORY (INHALATION) ONCE
Status: COMPLETED | OUTPATIENT
Start: 2025-05-25 | End: 2025-05-25

## 2025-05-25 RX ORDER — BENZONATATE 100 MG/1
100 CAPSULE ORAL 2 TIMES DAILY PRN
Qty: 10 CAPSULE | Refills: 0 | Status: SHIPPED | OUTPATIENT
Start: 2025-05-25 | End: 2025-05-30

## 2025-05-25 RX ADMIN — SODIUM CHLORIDE 500 ML: 0.9 INJECTION, SOLUTION INTRAVENOUS at 14:11

## 2025-05-25 RX ADMIN — BENZONATATE 100 MG: 100 CAPSULE ORAL at 13:53

## 2025-05-25 RX ADMIN — IPRATROPIUM BROMIDE AND ALBUTEROL SULFATE 1 DOSE: 2.5; .5 SOLUTION RESPIRATORY (INHALATION) at 13:53

## 2025-05-25 ASSESSMENT — PAIN - FUNCTIONAL ASSESSMENT
PAIN_FUNCTIONAL_ASSESSMENT: NONE - DENIES PAIN
PAIN_FUNCTIONAL_ASSESSMENT: NONE - DENIES PAIN

## 2025-05-25 NOTE — ED PROVIDER NOTES
[facility] EMERGENCY DEPARTMENT  EMERGENCY DEPARTMENT ENCOUNTER      Pt Name: Adriana Izquierdo  MRN: [id number]  Birthdate 1940  Date of evaluation: 5/25/2025  Provider: Jermaine Ugalde MD  2:33 PM    CHIEF COMPLAINT       Chief Complaint   Patient presents with    Cold Symptoms     Patient complains of worsening cough and chest congestion x 4 days, states she is coughing so hard \"it takes my breath away.\"          HISTORY OF PRESENT ILLNESS    Adriana Izquierdo is a 84 y.o. female who presents to the emergency department past medical history of hypertension hyperlipidemia presenting with cough for the last 4 days.  She denies chest pain or shortness of breath at rest but states when she gets in a coughing fit it takes her breath away.  Denies fever, chills, nausea, vomiting.  Does have general malaise.  Sick contact with other family member last week with similar symptoms.  Has been taking over-the-counter cough medicine for symptoms    HPI    Nursing Notes were reviewed.    REVIEW OF SYSTEMS       Review of Systems   Constitutional:  Negative for fever.       Except as noted above the remainder of the review of systems was reviewed and negative.       PAST MEDICAL HISTORY     Past Medical History:   Diagnosis Date    Anesthesia complication     \" thrashed about\"    Arthritis     Cancer (HCC)     left breast    COVID-19 12/01/2020    Hyperlipidemia     Hypertension     S/P chemotherapy, time since greater than 12 weeks     Wears dentures          SURGICAL HISTORY       Past Surgical History:   Procedure Laterality Date    BREAST SURGERY Left     mastectomy    BREAST SURGERY Bilateral     implant    CARPAL TUNNEL RELEASE Right 3/28/2023    RIGHT CARPAL TUNNEL RELEASE performed by Jose Nelson MD at [facility] OR    CATARACT EXTRACTION W/ INTRAOCULAR LENS IMPLANT Right     CHOLECYSTECTOMY      COLONOSCOPY      HEMORRHOID SURGERY      HYSTERECTOMY (CERVIX STATUS UNKNOWN)      INTRACAPSULAR CATARACT EXTRACTION Left

## 2025-05-27 ENCOUNTER — HOSPITAL ENCOUNTER (INPATIENT)
Age: 85
LOS: 2 days | Discharge: HOME | DRG: 640 | End: 2025-05-29
Attending: EMERGENCY MEDICINE | Admitting: INTERNAL MEDICINE
Payer: MEDICARE

## 2025-05-27 ENCOUNTER — APPOINTMENT (OUTPATIENT)
Dept: GENERAL RADIOLOGY | Age: 85
DRG: 640 | End: 2025-05-27
Payer: MEDICARE

## 2025-05-27 DIAGNOSIS — R19.7 DIARRHEA, UNSPECIFIED TYPE: ICD-10-CM

## 2025-05-27 DIAGNOSIS — E87.1 HYPONATREMIA: ICD-10-CM

## 2025-05-27 DIAGNOSIS — J18.9 MULTIFOCAL PNEUMONIA: Primary | ICD-10-CM

## 2025-05-27 LAB
ALBUMIN SERPL-MCNC: 3.6 G/DL (ref 3.4–5)
ALBUMIN/GLOB SERPL: 1.1 {RATIO} (ref 1.1–2.2)
ALP SERPL-CCNC: 70 U/L (ref 40–129)
ALT SERPL-CCNC: 25 U/L (ref 10–40)
ANION GAP SERPL CALCULATED.3IONS-SCNC: 11 MMOL/L (ref 3–16)
ANION GAP SERPL CALCULATED.3IONS-SCNC: 11 MMOL/L (ref 3–16)
AST SERPL-CCNC: 40 U/L (ref 15–37)
BASE EXCESS BLDV CALC-SCNC: -2 MMOL/L (ref -3–3)
BASOPHILS # BLD: 0 K/UL (ref 0–0.2)
BASOPHILS NFR BLD: 0.6 %
BILIRUB SERPL-MCNC: 0.4 MG/DL (ref 0–1)
BILIRUB UR QL STRIP.AUTO: NEGATIVE
BUN SERPL-MCNC: 20 MG/DL (ref 7–20)
BUN SERPL-MCNC: 23 MG/DL (ref 7–20)
CALCIUM SERPL-MCNC: 8.1 MG/DL (ref 8.3–10.6)
CALCIUM SERPL-MCNC: 8.2 MG/DL (ref 8.3–10.6)
CHLORIDE SERPL-SCNC: 100 MMOL/L (ref 99–110)
CHLORIDE SERPL-SCNC: 91 MMOL/L (ref 99–110)
CLARITY UR: CLEAR
CO2 BLDV-SCNC: 24 MMOL/L
CO2 SERPL-SCNC: 22 MMOL/L (ref 21–32)
CO2 SERPL-SCNC: 23 MMOL/L (ref 21–32)
COHGB MFR BLDV: 0.6 % (ref 0–1.5)
COLOR UR: YELLOW
CREAT SERPL-MCNC: 0.9 MG/DL (ref 0.6–1.2)
CREAT SERPL-MCNC: 1 MG/DL (ref 0.6–1.2)
DEPRECATED RDW RBC AUTO: 12.4 % (ref 12.4–15.4)
EOSINOPHIL # BLD: 0 K/UL (ref 0–0.6)
EOSINOPHIL NFR BLD: 0.6 %
EPI CELLS #/AREA URNS HPF: NORMAL /HPF (ref 0–5)
FLUAV RNA RESP QL NAA+PROBE: NOT DETECTED
FLUBV RNA RESP QL NAA+PROBE: NOT DETECTED
GFR SERPLBLD CREATININE-BSD FMLA CKD-EPI: 55 ML/MIN/{1.73_M2}
GFR SERPLBLD CREATININE-BSD FMLA CKD-EPI: 63 ML/MIN/{1.73_M2}
GLUCOSE SERPL-MCNC: 104 MG/DL (ref 70–99)
GLUCOSE SERPL-MCNC: 110 MG/DL (ref 70–99)
GLUCOSE UR STRIP.AUTO-MCNC: NEGATIVE MG/DL
HCO3 BLDV-SCNC: 22.7 MMOL/L (ref 23–29)
HCT VFR BLD AUTO: 34.6 % (ref 36–48)
HGB BLD-MCNC: 12.1 G/DL (ref 12–16)
HGB UR QL STRIP.AUTO: ABNORMAL
KETONES UR STRIP.AUTO-MCNC: NEGATIVE MG/DL
LACTATE BLDV-SCNC: 0.9 MMOL/L (ref 0.4–1.9)
LEUKOCYTE ESTERASE UR QL STRIP.AUTO: NEGATIVE
LYMPHOCYTES # BLD: 0.7 K/UL (ref 1–5.1)
LYMPHOCYTES NFR BLD: 18.3 %
MAGNESIUM SERPL-MCNC: 1.85 MG/DL (ref 1.8–2.4)
MCH RBC QN AUTO: 31.5 PG (ref 26–34)
MCHC RBC AUTO-ENTMCNC: 34.8 G/DL (ref 31–36)
MCV RBC AUTO: 90.4 FL (ref 80–100)
METHGB MFR BLDV: 0.1 %
MONOCYTES # BLD: 0.6 K/UL (ref 0–1.3)
MONOCYTES NFR BLD: 15.9 %
NEUTROPHILS # BLD: 2.4 K/UL (ref 1.7–7.7)
NEUTROPHILS NFR BLD: 64.6 %
NITRITE UR QL STRIP.AUTO: NEGATIVE
O2 CT VFR BLDV CALC: 15 VOL %
O2 THERAPY: ABNORMAL
OSMOLALITY SERPL: 273 MOSM/KG (ref 280–301)
OSMOLALITY UR: 119 MOSM/KG (ref 390–1070)
PCO2 BLDV: 38.3 MMHG (ref 40–50)
PH BLDV: 7.39 [PH] (ref 7.35–7.45)
PH UR STRIP.AUTO: 6 [PH] (ref 5–8)
PLATELET # BLD AUTO: 217 K/UL (ref 135–450)
PMV BLD AUTO: 6.6 FL (ref 5–10.5)
PO2 BLDV: 46.2 MMHG (ref 25–40)
POTASSIUM SERPL-SCNC: 4 MMOL/L (ref 3.5–5.1)
POTASSIUM SERPL-SCNC: 4.3 MMOL/L (ref 3.5–5.1)
PROCALCITONIN SERPL IA-MCNC: 0.06 NG/ML (ref 0–0.15)
PROT SERPL-MCNC: 6.8 G/DL (ref 6.4–8.2)
PROT UR STRIP.AUTO-MCNC: NEGATIVE MG/DL
RBC # BLD AUTO: 3.83 M/UL (ref 4–5.2)
RBC #/AREA URNS HPF: NORMAL /HPF (ref 0–4)
SAO2 % BLDV: 82 %
SARS-COV-2 RNA RESP QL NAA+PROBE: NOT DETECTED
SODIUM SERPL-SCNC: 124 MMOL/L (ref 136–145)
SODIUM SERPL-SCNC: 134 MMOL/L (ref 136–145)
SODIUM UR-SCNC: <20 MMOL/L
SP GR UR STRIP.AUTO: <=1.005 (ref 1–1.03)
UA COMPLETE W REFLEX CULTURE PNL UR: ABNORMAL
UA DIPSTICK W REFLEX MICRO PNL UR: YES
URN SPEC COLLECT METH UR: ABNORMAL
UROBILINOGEN UR STRIP-ACNC: 0.2 E.U./DL
WBC # BLD AUTO: 3.6 K/UL (ref 4–11)
WBC #/AREA URNS HPF: NORMAL /HPF (ref 0–5)

## 2025-05-27 PROCEDURE — 6370000000 HC RX 637 (ALT 250 FOR IP)

## 2025-05-27 PROCEDURE — 99223 1ST HOSP IP/OBS HIGH 75: CPT

## 2025-05-27 PROCEDURE — 84145 PROCALCITONIN (PCT): CPT

## 2025-05-27 PROCEDURE — 87449 NOS EACH ORGANISM AG IA: CPT

## 2025-05-27 PROCEDURE — 80053 COMPREHEN METABOLIC PANEL: CPT

## 2025-05-27 PROCEDURE — 82803 BLOOD GASES ANY COMBINATION: CPT

## 2025-05-27 PROCEDURE — 83935 ASSAY OF URINE OSMOLALITY: CPT

## 2025-05-27 PROCEDURE — 36415 COLL VENOUS BLD VENIPUNCTURE: CPT

## 2025-05-27 PROCEDURE — 87040 BLOOD CULTURE FOR BACTERIA: CPT

## 2025-05-27 PROCEDURE — 87070 CULTURE OTHR SPECIMN AEROBIC: CPT

## 2025-05-27 PROCEDURE — 2060000000 HC ICU INTERMEDIATE R&B

## 2025-05-27 PROCEDURE — 2580000003 HC RX 258: Performed by: NURSE PRACTITIONER

## 2025-05-27 PROCEDURE — 6360000002 HC RX W HCPCS: Performed by: NURSE PRACTITIONER

## 2025-05-27 PROCEDURE — 83735 ASSAY OF MAGNESIUM: CPT

## 2025-05-27 PROCEDURE — 85025 COMPLETE CBC W/AUTO DIFF WBC: CPT

## 2025-05-27 PROCEDURE — 99285 EMERGENCY DEPT VISIT HI MDM: CPT

## 2025-05-27 PROCEDURE — 81001 URINALYSIS AUTO W/SCOPE: CPT

## 2025-05-27 PROCEDURE — 83930 ASSAY OF BLOOD OSMOLALITY: CPT

## 2025-05-27 PROCEDURE — 84300 ASSAY OF URINE SODIUM: CPT

## 2025-05-27 PROCEDURE — 71046 X-RAY EXAM CHEST 2 VIEWS: CPT

## 2025-05-27 PROCEDURE — 2580000003 HC RX 258: Performed by: EMERGENCY MEDICINE

## 2025-05-27 PROCEDURE — 87636 SARSCOV2 & INF A&B AMP PRB: CPT

## 2025-05-27 PROCEDURE — 83605 ASSAY OF LACTIC ACID: CPT

## 2025-05-27 PROCEDURE — 93005 ELECTROCARDIOGRAM TRACING: CPT

## 2025-05-27 PROCEDURE — 87205 SMEAR GRAM STAIN: CPT

## 2025-05-27 RX ORDER — BENZONATATE 100 MG/1
100 CAPSULE ORAL 3 TIMES DAILY PRN
Status: DISCONTINUED | OUTPATIENT
Start: 2025-05-27 | End: 2025-05-29 | Stop reason: HOSPADM

## 2025-05-27 RX ORDER — ACETAMINOPHEN 650 MG/1
650 SUPPOSITORY RECTAL EVERY 6 HOURS PRN
Status: DISCONTINUED | OUTPATIENT
Start: 2025-05-27 | End: 2025-05-29 | Stop reason: HOSPADM

## 2025-05-27 RX ORDER — ENOXAPARIN SODIUM 100 MG/ML
40 INJECTION SUBCUTANEOUS DAILY
Status: DISCONTINUED | OUTPATIENT
Start: 2025-05-27 | End: 2025-05-29 | Stop reason: HOSPADM

## 2025-05-27 RX ORDER — GUAIFENESIN 600 MG/1
600 TABLET, EXTENDED RELEASE ORAL 2 TIMES DAILY
Status: DISCONTINUED | OUTPATIENT
Start: 2025-05-27 | End: 2025-05-29 | Stop reason: HOSPADM

## 2025-05-27 RX ORDER — ATORVASTATIN CALCIUM 20 MG/1
20 TABLET, FILM COATED ORAL DAILY
COMMUNITY
Start: 2025-04-01

## 2025-05-27 RX ORDER — SODIUM CHLORIDE 9 MG/ML
1000 INJECTION, SOLUTION INTRAVENOUS CONTINUOUS
Status: DISCONTINUED | OUTPATIENT
Start: 2025-05-27 | End: 2025-05-27

## 2025-05-27 RX ORDER — ACETAMINOPHEN 325 MG/1
650 TABLET ORAL EVERY 6 HOURS PRN
Status: DISCONTINUED | OUTPATIENT
Start: 2025-05-27 | End: 2025-05-29 | Stop reason: HOSPADM

## 2025-05-27 RX ORDER — ALBUTEROL SULFATE 90 UG/1
INHALANT RESPIRATORY (INHALATION)
Status: COMPLETED
Start: 2025-05-27 | End: 2025-05-27

## 2025-05-27 RX ORDER — SODIUM CHLORIDE 0.9 % (FLUSH) 0.9 %
5-40 SYRINGE (ML) INJECTION PRN
Status: DISCONTINUED | OUTPATIENT
Start: 2025-05-27 | End: 2025-05-29 | Stop reason: HOSPADM

## 2025-05-27 RX ORDER — SODIUM CHLORIDE 9 MG/ML
INJECTION, SOLUTION INTRAVENOUS CONTINUOUS
Status: DISCONTINUED | OUTPATIENT
Start: 2025-05-27 | End: 2025-05-28

## 2025-05-27 RX ORDER — AZITHROMYCIN 250 MG/1
500 TABLET, FILM COATED ORAL EVERY 24 HOURS
Status: DISCONTINUED | OUTPATIENT
Start: 2025-05-28 | End: 2025-05-29 | Stop reason: HOSPADM

## 2025-05-27 RX ORDER — SODIUM CHLORIDE 9 MG/ML
INJECTION, SOLUTION INTRAVENOUS PRN
Status: DISCONTINUED | OUTPATIENT
Start: 2025-05-27 | End: 2025-05-29 | Stop reason: HOSPADM

## 2025-05-27 RX ORDER — LISINOPRIL 10 MG/1
10 TABLET ORAL DAILY
Status: DISCONTINUED | OUTPATIENT
Start: 2025-05-28 | End: 2025-05-29 | Stop reason: HOSPADM

## 2025-05-27 RX ORDER — LACTOBACILLUS RHAMNOSUS GG 10B CELL
1 CAPSULE ORAL
Status: DISCONTINUED | OUTPATIENT
Start: 2025-05-28 | End: 2025-05-29 | Stop reason: HOSPADM

## 2025-05-27 RX ORDER — SODIUM CHLORIDE 0.9 % (FLUSH) 0.9 %
5-40 SYRINGE (ML) INJECTION EVERY 12 HOURS SCHEDULED
Status: DISCONTINUED | OUTPATIENT
Start: 2025-05-27 | End: 2025-05-29 | Stop reason: HOSPADM

## 2025-05-27 RX ORDER — ATORVASTATIN CALCIUM 10 MG/1
20 TABLET, FILM COATED ORAL DAILY
Status: DISCONTINUED | OUTPATIENT
Start: 2025-05-27 | End: 2025-05-29 | Stop reason: HOSPADM

## 2025-05-27 RX ORDER — ALBUTEROL SULFATE 90 UG/1
2 INHALANT RESPIRATORY (INHALATION) ONCE
Status: COMPLETED | OUTPATIENT
Start: 2025-05-27 | End: 2025-05-27

## 2025-05-27 RX ORDER — POLYETHYLENE GLYCOL 3350 17 G/17G
17 POWDER, FOR SOLUTION ORAL DAILY PRN
Status: DISCONTINUED | OUTPATIENT
Start: 2025-05-27 | End: 2025-05-29 | Stop reason: HOSPADM

## 2025-05-27 RX ADMIN — GUAIFENESIN 600 MG: 600 TABLET, EXTENDED RELEASE ORAL at 21:17

## 2025-05-27 RX ADMIN — ALBUTEROL SULFATE 2 PUFF: 90 INHALANT RESPIRATORY (INHALATION) at 10:04

## 2025-05-27 RX ADMIN — SODIUM CHLORIDE 1000 MG: 9 INJECTION, SOLUTION INTRAVENOUS at 13:31

## 2025-05-27 RX ADMIN — SODIUM CHLORIDE 1000 ML: 0.9 INJECTION, SOLUTION INTRAVENOUS at 11:29

## 2025-05-27 RX ADMIN — AZITHROMYCIN MONOHYDRATE 500 MG: 500 INJECTION, POWDER, LYOPHILIZED, FOR SOLUTION INTRAVENOUS at 11:31

## 2025-05-27 RX ADMIN — ALBUTEROL SULFATE 2 PUFF: 90 AEROSOL, METERED RESPIRATORY (INHALATION) at 10:04

## 2025-05-27 ASSESSMENT — PAIN SCALES - GENERAL: PAINLEVEL_OUTOF10: 0

## 2025-05-27 ASSESSMENT — PAIN - FUNCTIONAL ASSESSMENT: PAIN_FUNCTIONAL_ASSESSMENT: 0-10

## 2025-05-27 NOTE — H&P
Hospital Medicine History & Physical      PCP: Aura Kim MD    Date of Admission: 5/27/2025    Date of Service: Pt seen/examined on 05/27/25    Chief Complaint:    Chief Complaint   Patient presents with    Respiratory Distress     At [place] told she has a touch of pneumonia and bronchitis.  Meds giving diarrhea, can't sleep.          History Of Present Illness:      The patient is a 84 y.o. female with a PMHx of hypertension, hyperlipidemia, breast cancer (1996) who presents to [facility] with cough.  Patient reports that symptoms started about 4 to 5 days ago.  She was seen at [facility] ED 5/25/2025 and dx with pneumonia. She was given prescription for doxycycline and Tessalon.  She reports that since that time, she has had diarrhea.  Says every time that she goes to the bathroom, she has a little bit of diarrhea.  Has not been able to sleep.  Reports that she has not eaten in 2 to 3 days.  Has an ongoing productive cough.  Denies any shortness of breath.  Denies any fevers, however, does endorse chills.  Endorses nausea, no vomiting.  Overall feeling very fatigued with minor weakness. Denies any chest pain, abdominal pain, syncope    History obtained from the patient and review of EMR.     Past Medical History:        Diagnosis Date    Anesthesia complication     \" thrashed about\"    Arthritis     Cancer (HCC)     left breast    COVID-19 12/01/2020    Hyperlipidemia     Hypertension     S/P chemotherapy, time since greater than 12 weeks     Wears dentures        Past Surgical History:        Procedure Laterality Date    BREAST SURGERY Left     mastectomy    BREAST SURGERY Bilateral     implant    CARPAL TUNNEL RELEASE Right 3/28/2023    RIGHT CARPAL TUNNEL RELEASE performed by Jose Nelson MD at [facility] OR    CATARACT EXTRACTION W/ INTRAOCULAR LENS IMPLANT Right     CHOLECYSTECTOMY      COLONOSCOPY      HEMORRHOID SURGERY      HYSTERECTOMY (CERVIX STATUS UNKNOWN)      INTRACAPSULAR CATARACT

## 2025-05-27 NOTE — ED NOTES
Moved patient to room 03 from the A chairs, put on cardiac monitor, patient blood pressure went up to 133/64

## 2025-05-27 NOTE — CARE COORDINATION
Case Management Assessment  Initial Evaluation    Date/Time of Evaluation: 5/27/2025 3:26 PM  Assessment Completed by: Cecelia Camara RN    If patient is discharged prior to next notation, then this note serves as note for discharge by case management.    Patient Name: Adriana Izquierdo                   YOB: 1940  Diagnosis: Hyponatremia [E87.1]  Diarrhea, unspecified type [R19.7]  Multifocal pneumonia [J18.9]                   Date / Time: 5/27/2025  9:20 AM    Patient Admission Status: Inpatient   Readmission Risk (Low < 19, Mod (19-27), High > 27): Readmission Risk Score: 9.3    Current PCP: Aura Kim MD  PCP verified by CM? Yes    Chart Reviewed: Yes      History Provided by: Patient  Patient Orientation: Alert and Oriented    Patient Cognition: Alert    Hospitalization in the last 30 days (Readmission):  No    If yes, Readmission Assessment in CM Navigator will be completed.    Advance Directives:      Code Status: Full Code   Patient's Primary Decision Maker is: Legal Next of Kin    Primary Decision Maker: Reynaldo Taylorsaira - Child - [phone]    Discharge Planning:    Patient lives with: Alone Type of Home: House  Primary Care Giver: Self  Patient Support Systems include: Children   Current Financial resources: Medicare  Current community resources: None  Current services prior to admission: None            Current DME:              Type of Home Care services:  None    ADLS  Prior functional level: Independent in ADLs/IADLs  Current functional level: Independent in ADLs/IADLs    PT AM-PAC:   /24  OT AM-PAC:   /24    Family can provide assistance at DC:    Would you like Case Management to discuss the discharge plan with any other family members/significant others, and if so, who? No  Plans to Return to Present Housing: Yes  Other Identified Issues/Barriers to RETURNING to current housing: na  Potential Assistance needed at discharge: N/A            Potential DME:    Patient expects to

## 2025-05-27 NOTE — ED NOTES
Adriana Izquierdo is a 84 y.o. female admitted for  Principal Problem:    Hyponatremia  Resolved Problems:    * No resolved hospital problems. *  .   Patient Home via self with   Chief Complaint   Patient presents with    Respiratory Distress     At [place] told she has a touch of pneumonia and bronchitis.  Meds giving diarrhea, can't sleep.    .  Patient is alert and Person, Place, Time, and Situation  Patient's baseline mobility: Baseline Mobility: Independent   Code Status: Prior   Cardiac Rhythm:       Is patient on baseline Oxygen: no how many Liters:   Abnormal Assessment Findings: cough    Isolation: None        NIH Score:    C-SSRS: Risk of Suicide: No Risk  Bedside swallow:        Active LDA's:   Peripheral IV 05/27/25 Right Antecubital (Active)     Patient admitted with a melara: no If the melara is chronic was it exchanged:  Reason for melara:   Patient admitted with Central Line:  NA . PICC line placement confirmed: YES OR NO:[id number]}   Reason for Central line:   Was central line Inserted from an outside facility:        Family/Caregiver Present yes Any Concerns: no   Restraints no  Sitter no         Vitals:      Vitals:    05/27/25 0924 05/27/25 0925 05/27/25 1030 05/27/25 1055   BP:  133/70 (!) 106/58 133/64   Pulse:  79 74 75   Resp:  18  16   Temp: 97.5 °F (36.4 °C) 97.5 °F (36.4 °C) 97.9 °F (36.6 °C)    TempSrc: Oral Oral     SpO2:  97% 95% 98%   Weight:  61.7 kg (136 lb)     Height:  1.651 m (5' 5\")         Last documented pain score (0-10 scale) Pain Level: 0  Pain medication administered No.    Pertinent or High Risk Medications/Drips: No.    Pending Blood Product Administration: no    Abnormal labs:   Abnormal Labs Reviewed   CBC WITH AUTO DIFFERENTIAL - Abnormal; Notable for the following components:       Result Value    WBC 3.6 (*)     RBC 3.83 (*)     Hematocrit 34.6 (*)     Lymphocytes Absolute 0.7 (*)     All other components within normal limits   COMPREHENSIVE METABOLIC PANEL W/ REFLEX TO MG

## 2025-05-27 NOTE — ED PROVIDER NOTES
Emergency Department Attending Provider Note  Location: [facility] EMERGENCY DEPARTMENT  5/27/2025     Patient Identification  Adriana Izquierdo is a 84 y.o. female      Adriana Izquierdo was evaluated in the Emergency Department for pneumonia.     HPI: as noted above    Physical Exam: No apparent distress speaking full sentences after receiving breathing treatment.  Rhonchi at the bases bilaterally.  No pedal edema      Bedside Ultrasound  No results found.     Patient seen and evaluated.  Relevant records reviewed.  Patient presents with symptoms noted above.  Work appears notable for multifocal pneumonia, hyponatremia 124.  She appears to be hypovolemic given her active diarrhea.  Would benefit from broad-spectrum antibiotics, IV fluids judiciously correction of electrolytes and treatment of infection.    Although initial history and physical exam information was obtained by TIMOTHY/NPP/MD/DO (who also dictated a record of this visit), I personally saw the patient and made/approved the management plan and take responsibility for patient management. I, Dr. Du, am the primary clinician of record.     I personally saw the patient and independently provided 10 minutes of non-concurrent critical care out of the total shared critical care time excluding separately billed procedures.    This chart was generated in part by using Dragon Dictation system and may contain errors related to that system including errors in grammar, punctuation, and spelling, as well as words and phrases that may be inappropriate. If there are any questions or concerns please feel free to contact the dictating provider for clarification.     Spencer Du MD   Acute Care [facility]        Spencer Du MD  05/27/25 4070    
discussed)  IP CONSULT TO PALLIATIVE CARE  IP CONSULT TO NEPHROLOGY      Records Reviewed (External, Source and Summary)     CC/HPI Summary, DDx, ED Course, and Reassessment:   Request admission for multifocal pneumonia, failed outpatient treatment with oral antibiotics.  Patient presented to the ER today with symptoms of shortness of breath.  She has wheezing on exam.  Her vital signs are stable with oxygen saturation of 95% on room air.  She is mildly hyponatremic with sodium of 124.  She is also had a few episodes of diarrhea since starting doxy on Sunday. No symptoms of abdominal pain.  States that she believes she is having some intolerance to the antibiotic prescribed.  She was seen couple days ago at [facility] ER and had chest x-ray displayed right sided pneumonia.  Today chest xray displays multifocal pneumonia.  She was placed on doxycycline and Tessalon [place] without any relief.  She denies chest pain or abdominal pain.  No other acute concerns.  Patient does not meet sepsis criteria.  She does have white blood cell count of 3.6.  She has sodium of 124 which is down from 132.  VBG is unremarkable.  2 sets blood cultures obtained.  Lactic pending.  Started on Rocephin and azithromycin.  She was provided albuterol treatment for her symptoms of wheezing which improved, but not resolved.  Patient agrees with treatment plan and admission.    Disposition Considerations (tests considered but not done, Admit vs D/C, Shared Decision Making, Pt Expectation of Test or Tx.):      The patient tolerated their visit well.  The patient and / or the family were informed of the results of any tests, a time was given to answer questions, a plan was proposed and they agreed with plan.    I am the Primary Clinician of Record.  FINAL IMPRESSION      1. Multifocal pneumonia    2. Hyponatremia    3. Diarrhea, unspecified type          DISPOSITION/PLAN     DISPOSITION Admitted 05/27/2025 11:11:18 AM               PATIENT REFERRED

## 2025-05-27 NOTE — ACP (ADVANCE CARE PLANNING)
Advance Care Planning     General Advance Care Planning (ACP) Conversation    Date of Conversation: 5/27/2025  Conducted with: Patient with Decision Making Capacity  Other persons present: None    Healthcare Decision Maker:   Primary Decision Maker: BrandonReynaldosaira - Child - [phone]       Content/Action Overview:  DECLINED ACP Conversation - will revisit periodically  Reviewed DNR/DNI and patient elects Full Code (Attempt Resuscitation)        Length of Voluntary ACP Conversation in minutes:  <16 minutes (Non-Billable)    Cecelia Camara RN

## 2025-05-28 PROBLEM — R19.7 DIARRHEA: Status: ACTIVE | Noted: 2025-05-28

## 2025-05-28 LAB
ANION GAP SERPL CALCULATED.3IONS-SCNC: 8 MMOL/L (ref 3–16)
BASOPHILS # BLD: 0 K/UL (ref 0–0.2)
BASOPHILS NFR BLD: 0.7 %
BUN SERPL-MCNC: 18 MG/DL (ref 7–20)
CALCIUM SERPL-MCNC: 8.2 MG/DL (ref 8.3–10.6)
CHLORIDE SERPL-SCNC: 102 MMOL/L (ref 99–110)
CO2 SERPL-SCNC: 23 MMOL/L (ref 21–32)
CREAT SERPL-MCNC: 1 MG/DL (ref 0.6–1.2)
DEPRECATED RDW RBC AUTO: 12.2 % (ref 12.4–15.4)
EKG ATRIAL RATE: 78 BPM
EKG DIAGNOSIS: NORMAL
EKG P AXIS: 85 DEGREES
EKG P-R INTERVAL: 142 MS
EKG Q-T INTERVAL: 368 MS
EKG QRS DURATION: 78 MS
EKG QTC CALCULATION (BAZETT): 419 MS
EKG R AXIS: 72 DEGREES
EKG T AXIS: 87 DEGREES
EKG VENTRICULAR RATE: 78 BPM
EOSINOPHIL # BLD: 0.1 K/UL (ref 0–0.6)
EOSINOPHIL NFR BLD: 3.8 %
GFR SERPLBLD CREATININE-BSD FMLA CKD-EPI: 55 ML/MIN/{1.73_M2}
GLUCOSE SERPL-MCNC: 88 MG/DL (ref 70–99)
HCT VFR BLD AUTO: 31.7 % (ref 36–48)
HGB BLD-MCNC: 11.2 G/DL (ref 12–16)
LEGIONELLA AG UR QL: NORMAL
LYMPHOCYTES # BLD: 1.2 K/UL (ref 1–5.1)
LYMPHOCYTES NFR BLD: 33.5 %
MCH RBC QN AUTO: 32 PG (ref 26–34)
MCHC RBC AUTO-ENTMCNC: 35.3 G/DL (ref 31–36)
MCV RBC AUTO: 90.6 FL (ref 80–100)
MONOCYTES # BLD: 0.7 K/UL (ref 0–1.3)
MONOCYTES NFR BLD: 20.5 %
NEUTROPHILS # BLD: 1.5 K/UL (ref 1.7–7.7)
NEUTROPHILS NFR BLD: 41.5 %
OSMOLALITY UR: 141 MOSM/KG (ref 390–1070)
PLATELET # BLD AUTO: 224 K/UL (ref 135–450)
PMV BLD AUTO: 6.8 FL (ref 5–10.5)
POTASSIUM SERPL-SCNC: 4.1 MMOL/L (ref 3.5–5.1)
RBC # BLD AUTO: 3.5 M/UL (ref 4–5.2)
S PNEUM AG UR QL: NORMAL
SODIUM SERPL-SCNC: 133 MMOL/L (ref 136–145)
SODIUM SERPL-SCNC: 135 MMOL/L (ref 136–145)
SODIUM SERPL-SCNC: 136 MMOL/L (ref 136–145)
WBC # BLD AUTO: 3.5 K/UL (ref 4–11)

## 2025-05-28 PROCEDURE — 36415 COLL VENOUS BLD VENIPUNCTURE: CPT

## 2025-05-28 PROCEDURE — 84295 ASSAY OF SERUM SODIUM: CPT

## 2025-05-28 PROCEDURE — 6370000000 HC RX 637 (ALT 250 FOR IP)

## 2025-05-28 PROCEDURE — 2500000003 HC RX 250 WO HCPCS

## 2025-05-28 PROCEDURE — 85025 COMPLETE CBC W/AUTO DIFF WBC: CPT

## 2025-05-28 PROCEDURE — 2580000003 HC RX 258

## 2025-05-28 PROCEDURE — 93010 ELECTROCARDIOGRAM REPORT: CPT | Performed by: INTERNAL MEDICINE

## 2025-05-28 PROCEDURE — 2060000000 HC ICU INTERMEDIATE R&B

## 2025-05-28 PROCEDURE — 80048 BASIC METABOLIC PNL TOTAL CA: CPT

## 2025-05-28 PROCEDURE — 99232 SBSQ HOSP IP/OBS MODERATE 35: CPT | Performed by: INTERNAL MEDICINE

## 2025-05-28 PROCEDURE — 6360000002 HC RX W HCPCS

## 2025-05-28 RX ADMIN — Medication 10 ML: at 20:14

## 2025-05-28 RX ADMIN — GUAIFENESIN 600 MG: 600 TABLET, EXTENDED RELEASE ORAL at 20:15

## 2025-05-28 RX ADMIN — Medication 10 ML: at 08:04

## 2025-05-28 RX ADMIN — GUAIFENESIN 600 MG: 600 TABLET, EXTENDED RELEASE ORAL at 08:02

## 2025-05-28 RX ADMIN — SODIUM CHLORIDE: 0.9 INJECTION, SOLUTION INTRAVENOUS at 01:13

## 2025-05-28 RX ADMIN — Medication 1 CAPSULE: at 08:03

## 2025-05-28 RX ADMIN — AZITHROMYCIN DIHYDRATE 500 MG: 250 TABLET ORAL at 11:00

## 2025-05-28 RX ADMIN — LISINOPRIL 10 MG: 10 TABLET ORAL at 08:02

## 2025-05-28 RX ADMIN — SODIUM CHLORIDE 1000 MG: 9 INJECTION, SOLUTION INTRAVENOUS at 13:29

## 2025-05-28 RX ADMIN — ENOXAPARIN SODIUM 40 MG: 100 INJECTION SUBCUTANEOUS at 08:03

## 2025-05-28 NOTE — FLOWSHEET NOTE
05/28/25 0759   Vitals   Temp 97.7 °F (36.5 °C)   Temp Source Oral   Pulse 73   Heart Rate Source Monitor   Respirations 18   BP (!) 149/74   MAP (Calculated) 99   BP Location Right lower arm   BP Upper/Lower Lower   BP Method Automatic   Patient Position Semi fowlers   Oxygen Therapy   SpO2 95 %   O2 Device None (Room air)     Shift assessment completed-see flow sheet. Patient sitting up in bed. Awake,alert and oriented x4.  Vitals obtained. Lung sounds clear. Sinus on monitor,HR 73. Bowel sounds active x4.  Morning medications given per order.   Patient denies any further needs at this time,call light within reach.

## 2025-05-28 NOTE — PLAN OF CARE
Problem: Discharge Planning  Goal: Discharge to home or other facility with appropriate resources  5/27/2025 2159 by Liam Marino, RN  Outcome: Progressing  Flowsheets (Taken 5/27/2025 2159)  Discharge to home or other facility with appropriate resources:   Identify barriers to discharge with patient and caregiver   Arrange for needed discharge resources and transportation as appropriate     Problem: Pain  Goal: Verbalizes/displays adequate comfort level or baseline comfort level  5/27/2025 2159 by Liam Marino, RN  Outcome: Progressing  Flowsheets (Taken 5/27/2025 2159)  Verbalizes/displays adequate comfort level or baseline comfort level:   Encourage patient to monitor pain and request assistance   Assess pain using appropriate pain scale   Administer analgesics based on type and severity of pain and evaluate response   Implement non-pharmacological measures as appropriate and evaluate response     Problem: Safety - Adult  Goal: Free from fall injury  5/27/2025 2159 by Liam Marino, RN  Outcome: Progressing  Flowsheets (Taken 5/27/2025 2159)  Free From Fall Injury: Instruct family/caregiver on patient safety

## 2025-05-28 NOTE — PLAN OF CARE
Problem: Discharge Planning  Goal: Discharge to home or other facility with appropriate resources  5/28/2025 0938 by Ros Whitmore RN  Outcome: Progressing  Flowsheets (Taken 5/28/2025 0938)  Discharge to home or other facility with appropriate resources: Identify barriers to discharge with patient and caregiver  5/27/2025 2159 by Liam Marino RN  Outcome: Progressing  Flowsheets (Taken 5/27/2025 2159)  Discharge to home or other facility with appropriate resources:   Identify barriers to discharge with patient and caregiver   Arrange for needed discharge resources and transportation as appropriate     Problem: Pain  Goal: Verbalizes/displays adequate comfort level or baseline comfort level  5/28/2025 0938 by Ros Whitmore, RN  Outcome: Progressing  Flowsheets (Taken 5/28/2025 0938)  Verbalizes/displays adequate comfort level or baseline comfort level: Encourage patient to monitor pain and request assistance  5/27/2025 2159 by Liam Marino RN  Outcome: Progressing  Flowsheets (Taken 5/27/2025 2159)  Verbalizes/displays adequate comfort level or baseline comfort level:   Encourage patient to monitor pain and request assistance   Assess pain using appropriate pain scale   Administer analgesics based on type and severity of pain and evaluate response   Implement non-pharmacological measures as appropriate and evaluate response     Problem: Safety - Adult  Goal: Free from fall injury  5/27/2025 2159 by Liam Marino RN  Outcome: Progressing  Flowsheets (Taken 5/27/2025 2159)  Free From Fall Injury: Instruct family/caregiver on patient safety

## 2025-05-28 NOTE — CONSULTS
Nephrology Consult Note    Reason for Consult:  low serum Na  Requesting Physician:  Dr. Jenkins    Chief Complaint:  cough and felt terrible  History Obtained From:  patient, electronic medical record    History of Present Ilness:    84 year old female who lives alone.  She went to urgent care on Sunday and with cough and feeling poorly overall.  She was given treatment for bronchitis and PNA.  She went home and took her medications and felt worse and came to the ED for evaluation for failure to improve on outpatient treatment  She was noted to have a serum Na of 124 on admit to the hospital, this has already improved and up to 133 range now.  She tells me that she was not eating well for the 2-3 days prior to feeling poorly.    She was also having some loose stools as well.  CXR on admit shows multifocal PNA.      Past Medical History:        Diagnosis Date    Anesthesia complication     \" thrashed about\"    Arthritis     Cancer (HCC)     left breast    COVID-19 12/01/2020    Hyperlipidemia     Hypertension     S/P chemotherapy, time since greater than 12 weeks     Wears dentures        Past Surgical History:        Procedure Laterality Date    BREAST SURGERY Left     mastectomy    BREAST SURGERY Bilateral     implant    CARPAL TUNNEL RELEASE Right 3/28/2023    RIGHT CARPAL TUNNEL RELEASE performed by Jose Nelson MD at [facility] OR    CATARACT EXTRACTION W/ INTRAOCULAR LENS IMPLANT Right     CHOLECYSTECTOMY      COLONOSCOPY      HEMORRHOID SURGERY      HYSTERECTOMY (CERVIX STATUS UNKNOWN)      INTRACAPSULAR CATARACT EXTRACTION Left 12/12/2019    PHACO EMULSIFICATION OF CATARACT WITH INTRAOCULAR LENS IMPLANT LEFT EYE performed by Handy Hughes MD at [facility]    KNEE CARTILAGE SURGERY Left     TOTAL KNEE ARTHROPLASTY Left 11/08/2013       Current Medications:    Scheduled Meds:   sodium chloride flush  5-40 mL IntraVENous 2 times per day    enoxaparin  40 mg SubCUTAneous Daily    guaiFENesin  600 mg Oral  Discharge Instructions    Discharged to other by medical transportation with escort. Discharged via wheelchair, hospital escort: Yes.  Special equipment needed: Not Applicable    Be sure to schedule a follow-up appointment with your primary care doctor or any specialists as instructed.     Discharge Plan:   Diet Plan: Discussed  Activity Level: Discussed  Smoking Cessation Offered: Patient Refused  Confirmed Follow up Appointment: No (Comments) (patient discharged to [facility])  Confirmed Symptoms Management: Discussed  Pneumococcal Vaccine Administered/Refused: Given (See MAR)  Influenza Vaccine Indication: Indicated: 9 to 64 years of age  Influenza Vaccine Given - only chart on this line when given: Influenza Vaccine Given (See MAR)    I understand that a diet low in cholesterol, fat, and sodium is recommended for good health. Unless I have been given specific instructions below for another diet, I accept this instruction as my diet prescription.   Other diet:     Special Instructions: None    · Is patient discharged on Warfarin / Coumadin?   No     Depression / Suicide Risk    As you are discharged from this [facility], it is important to learn how to keep safe from harming yourself.    Recognize the warning signs:  · Abrupt changes in personality, positive or negative- including increase in energy   · Giving away possessions  · Change in eating patterns- significant weight changes-  positive or negative  · Change in sleeping patterns- unable to sleep or sleeping all the time   · Unwillingness or inability to communicate  · Depression  · Unusual sadness, discouragement and loneliness  · Talk of wanting to die  · Neglect of personal appearance   · Rebelliousness- reckless behavior  · Withdrawal from people/activities they love  · Confusion- inability to concentrate     If you or a loved one observes any of these behaviors or has concerns about self-harm, here's what you can do:  · Talk about it- your  feelings and reasons for harming yourself  · Remove any means that you might use to hurt yourself (examples: pills, rope, extension cords, firearm)  · Get professional help from the community (Mental Health, Substance Abuse, psychological counseling)  · Do not be alone:Call your Safe Contact- someone whom you trust who will be there for you.  · Call your local CRISIS HOTLINE 162-6482 or 220-876-2119  · Call your local Children's Mobile Crisis Response Team [place] (680) 221-0363 or [employer]  · Call the toll free National Suicide Prevention Hotlines   · National Suicide Prevention Lifeline 525-071-ETRG (1294)  · [place] Line Network 800-SUICIDE (425-7619)      Acute Pancreatitis  Introduction  Acute pancreatitis is a condition in which the pancreas suddenly gets irritated and swollen (has inflammation). The pancreas is a large gland behind the stomach. It makes enzymes that help to digest food. The pancreas also makes hormones that help to control your blood sugar. Acute pancreatitis happens when the enzymes attack the pancreas and damage it. Most attacks last a couple of days and can cause serious problems.  Follow these instructions at home:  Eating and drinking  · Follow instructions from your doctor about diet. You may need to:  ¨ Avoid alcohol.  ¨ Limit how much fat is in your diet.  · Eat small meals often. Avoid eating big meals.  · Drink enough fluid to keep your pee (urine) clear or pale yellow.  · Do not drink alcohol if it caused your condition.  General instructions  · Take over-the-counter and prescription medicines only as told by your doctor.  · Do not use any tobacco products. These include cigarettes, chewing tobacco, and e-cigarettes. If you need help quitting, ask your doctor.  · Get plenty of rest.  · If directed, check your blood sugar at home as told by your doctor.  · Keep all follow-up visits as told by your doctor. This is important.  Contact a doctor if:  · You do not  get better as quickly as expected.  · You have new symptoms.  · Your symptoms get worse.  · You have lasting pain or weakness.  · You continue to feel sick to your stomach (nauseous).  · You get better and then you have another pain attack.  · You have a fever.  Get help right away if:  · You cannot eat or keep fluids down.  · Your pain becomes very bad.  · Your skin or the white part of your eyes turns yellow (jaundice).  · You throw up (vomit).  · You feel dizzy or you pass out (faint).  · Your blood sugar is high (over 300 mg/dL).  This information is not intended to replace advice given to you by your health care provider. Make sure you discuss any questions you have with your health care provider.  Document Released: 06/05/2009 Document Revised: 05/25/2017 Document Reviewed: 09/20/2016  © 2017 [employer]    Metoprolol tablets  What is this medicine?  METOPROLOL (me TOE proe lole) is a beta-blocker. Beta-blockers reduce the workload on the heart and help it to beat more regularly. This medicine is used to treat high blood pressure and to prevent chest pain. It is also used to after a heart attack and to prevent an additional heart attack from occurring.  This medicine may be used for other purposes; ask your health care provider or pharmacist if you have questions.  COMMON BRAND NAME(S): Lopressor  What should I tell my health care provider before I take this medicine?  They need to know if you have any of these conditions:  -diabetes  -heart or vessel disease like slow heart rate, worsening heart failure, heart block, sick sinus syndrome or Raynaud's disease  -kidney disease  -liver disease  -lung or breathing disease, like asthma or emphysema  -pheochromocytoma  -thyroid disease  -an unusual or allergic reaction to metoprolol, other beta-blockers, medicines, foods, dyes, or preservatives  -pregnant or trying to get pregnant  -breast-feeding  How should I use this medicine?  Take this medicine by mouth with a  drink of water. Follow the directions on the prescription label. Take this medicine immediately after meals. Take your doses at regular intervals. Do not take more medicine than directed. Do not stop taking this medicine suddenly. This could lead to serious heart-related effects.  Talk to your pediatrician regarding the use of this medicine in children. Special care may be needed.  Overdosage: If you think you have taken too much of this medicine contact a poison control center or emergency room at once.  NOTE: This medicine is only for you. Do not share this medicine with others.  What if I miss a dose?  If you miss a dose, take it as soon as you can. If it is almost time for your next dose, take only that dose. Do not take double or extra doses.  What may interact with this medicine?  This medicine may interact with the following medications:  -certain medicines for blood pressure, heart disease, irregular heart beat  -certain medicines for depression like monoamine oxidase (MAO) inhibitors, fluoxetine, or paroxetine  -clonidine  -dobutamine  -epinephrine  -isoproterenol  -reserpine  This list may not describe all possible interactions. Give your health care provider a list of all the medicines, herbs, non-prescription drugs, or dietary supplements you use. Also tell them if you smoke, drink alcohol, or use illegal drugs. Some items may interact with your medicine.  What should I watch for while using this medicine?  Visit your doctor or health care professional for regular check ups. Contact your doctor right away if your symptoms worsen. Check your blood pressure and pulse rate regularly. Ask your health care professional what your blood pressure and pulse rate should be, and when you should contact them.  You may get drowsy or dizzy. Do not drive, use machinery, or do anything that needs mental alertness until you know how this medicine affects you. Do not sit or stand up quickly, especially if you are an older  patient. This reduces the risk of dizzy or fainting spells. Contact your doctor if these symptoms continue. Alcohol may interfere with the effect of this medicine. Avoid alcoholic drinks.  What side effects may I notice from receiving this medicine?  Side effects that you should report to your doctor or health care professional as soon as possible:  -allergic reactions like skin rash, itching or hives  -cold or numb hands or feet  -depression  -difficulty breathing  -faint  -fever with sore throat  -irregular heartbeat, chest pain  -rapid weight gain  -swollen legs or ankles  Side effects that usually do not require medical attention (report to your doctor or health care professional if they continue or are bothersome):  -anxiety or nervousness  -change in sex drive or performance  -dry skin  -headache  -nightmares or trouble sleeping  -short term memory loss  -stomach upset or diarrhea  -unusually tired  This list may not describe all possible side effects. Call your doctor for medical advice about side effects. You may report side effects to FDA at 9-893-FDA-2463.  Where should I keep my medicine?  Keep out of the reach of children.  Store at room temperature between 15 and 30 degrees C (59 and 86 degrees F). Throw away any unused medicine after the expiration date.  NOTE: This sheet is a summary. It may not cover all possible information. If you have questions about this medicine, talk to your doctor, pharmacist, or health care provider.  © 2018 Elsevier/Gold Standard (2014-08-22 14:40:36)    Simvastatin tablets  What is this medicine?  SIMVASTATIN (SIM va stat in) is known as a HMG-CoA reductase inhibitor or 'statin'. It lowers the level of cholesterol and triglycerides in the blood. This drug may also reduce the risk of heart attack, stroke, or other health problems in patients with risk factors for heart or blood vessel disease. Diet and lifestyle changes are often used with this drug.  This medicine may be  used for other purposes; ask your health care provider or pharmacist if you have questions.  COMMON BRAND NAME(S): Cira  What should I tell my health care provider before I take this medicine?  They need to know if you have any of these conditions:  -frequently drink alcoholic beverages  -kidney disease  -liver disease  -muscle aches or weakness  -other medical condition  -an unusual or allergic reaction to simvastatin, other medicines, foods, dyes, or preservatives  -pregnant or trying to get pregnant  -breast-feeding  How should I use this medicine?  Take this medicine by mouth with a glass of water. Follow the directions on the prescription label. You can take this medicine with or without food. Take your doses at regular intervals. Do not take your medicine more often than directed.  Talk to your pediatrician regarding the use of this medicine in children. Special care may be needed.  Overdosage: If you think you have taken too much of this medicine contact a poison control center or emergency room at once.  NOTE: This medicine is only for you. Do not share this medicine with others.  What if I miss a dose?  If you miss a dose, take it as soon as you can. If it is almost time for your next dose, take only that dose. Do not take double or extra doses.  What may interact with this medicine?  Do not take this medicine with any of the following medications:  -boceprevir  -cyclosporine  -danazol  -gemfibrozil  -medicines for fungal infections like itraconazole, ketoconazole, posaconazole, voriconazole  -medicines for HIV infection  -mifepristone, RU-486  -nefazodone  -red yeast rice  -some antibiotics like clarithromycin, erythromycin, telithromycin  -telaprevir  This medicine may also interact with the following medications:  -alcohol  -amiodarone  -amlodipine  -colchicine  -digoxin  -diltiazem  -dronedarone  -fluconazole  -grapefruit juice  -niacin  -ranolazine  -verapamil  -warfarin  This list may not describe  all possible interactions. Give your health care provider a list of all the medicines, herbs, non-prescription drugs, or dietary supplements you use. Also tell them if you smoke, drink alcohol, or use illegal drugs. Some items may interact with your medicine.  What should I watch for while using this medicine?  Visit your doctor or health care professional for regular check-ups. You may need regular tests to make sure your liver is working properly.  Tell your doctor or health care professional right away if you get any unexplained muscle pain, tenderness, or weakness, especially if you also have a fever and tiredness. Your doctor or health care professional may tell you to stop taking this medicine if you develop muscle problems. If your muscle problems do not go away after stopping this medicine, contact your health care professional.  This medicine may affect blood sugar levels. If you have diabetes, check with your doctor or health care professional before you change your diet or the dose of your diabetic medicine.  This drug is only part of a total heart-health program. Your doctor or a dietician can suggest a low-cholesterol and low-fat diet to help. Avoid alcohol and smoking, and keep a proper exercise schedule.  Do not use this drug if you are pregnant or breast-feeding. Serious side effects to an unborn child or to an infant are possible. Talk to your doctor or pharmacist for more information.  If you are going to have surgery tell your health care professional that you are taking this drug.  Some drugs may increase the risk of side effects from simvastatin. If you are given certain antibiotics or antifungals, your doctor or health care professional may stop simvastatin for a short time. Check with your doctor or pharmacist for advice.  What side effects may I notice from receiving this medicine?  Side effects that you should report to your doctor or health care professional as soon as possible:  -allergic  reactions like skin rash, itching or hives, swelling of the face, lips, or tongue  -confusion  -dark urine  -fever  -joint pain  -loss of memory  -muscle cramps, pain  -redness, blistering, peeling or loosening of the skin, including inside the mouth  -trouble passing urine or change in the amount of urine  -unusually weak or tired  -yellowing of the eyes or skin  Side effects that usually do not require medical attention (report to your doctor or health care professional if they continue or are bothersome):  -constipation  -heartburn  -stomach gas, pain, upset  This list may not describe all possible side effects. Call your doctor for medical advice about side effects. You may report side effects to FDA at 4-244-FDA-3814.  Where should I keep my medicine?  Keep out of the reach of children.  Store at room temperature below 30 degrees C (86 degrees F). Keep container tightly closed. Throw away any unused medicine after the expiration date.  NOTE: This sheet is a summary. It may not cover all possible information. If you have questions about this medicine, talk to your doctor, pharmacist, or health care provider.  © 2018 Else[employer]/Gold Standard (2012-11-06 10:40:36)    Gabapentin capsules or tablets  What is this medicine?  GABAPENTIN (GA ba pen tin) is used to control partial seizures in adults with epilepsy. It is also used to treat certain types of nerve pain.  This medicine may be used for other purposes; ask your health care provider or pharmacist if you have questions.  COMMON BRAND NAME(S): Active-PAC with Gabapentin, Gabarone, Neurontin  What should I tell my health care provider before I take this medicine?  They need to know if you have any of these conditions:  -kidney disease  -suicidal thoughts, plans, or attempt; a previous suicide attempt by you or a family member  -an unusual or allergic reaction to gabapentin, other medicines, foods, dyes, or preservatives  -pregnant or trying to get  pregnant  -breast-feeding  How should I use this medicine?  Take this medicine by mouth with a glass of water. Follow the directions on the prescription label. You can take it with or without food. If it upsets your stomach, take it with food.Take your medicine at regular intervals. Do not take it more often than directed. Do not stop taking except on your doctor's advice.  If you are directed to break the 600 or 800 mg tablets in half as part of your dose, the extra half tablet should be used for the next dose. If you have not used the extra half tablet within 28 days, it should be thrown away.  A special MedGuide will be given to you by the pharmacist with each prescription and refill. Be sure to read this information carefully each time.  Talk to your pediatrician regarding the use of this medicine in children. Special care may be needed.  Overdosage: If you think you have taken too much of this medicine contact a poison control center or emergency room at once.  NOTE: This medicine is only for you. Do not share this medicine with others.  What if I miss a dose?  If you miss a dose, take it as soon as you can. If it is almost time for your next dose, take only that dose. Do not take double or extra doses.  What may interact with this medicine?  Do not take this medicine with any of the following medications:  -other gabapentin products  This medicine may also interact with the following medications:  -alcohol  -antacids  -antihistamines for allergy, cough and cold  -certain medicines for anxiety or sleep  -certain medicines for depression or psychotic disturbances  -homatropine; hydrocodone  -naproxen  -narcotic medicines (opiates) for pain  -phenothiazines like chlorpromazine, mesoridazine, prochlorperazine, thioridazine  This list may not describe all possible interactions. Give your health care provider a list of all the medicines, herbs, non-prescription drugs, or dietary supplements you use. Also tell them  if you smoke, drink alcohol, or use illegal drugs. Some items may interact with your medicine.  What should I watch for while using this medicine?  Visit your doctor or health care professional for regular checks on your progress. You may want to keep a record at home of how you feel your condition is responding to treatment. You may want to share this information with your doctor or health care professional at each visit. You should contact your doctor or health care professional if your seizures get worse or if you have any new types of seizures. Do not stop taking this medicine or any of your seizure medicines unless instructed by your doctor or health care professional. Stopping your medicine suddenly can increase your seizures or their severity.  Wear a medical identification bracelet or chain if you are taking this medicine for seizures, and carry a card that lists all your medications.  You may get drowsy, dizzy, or have blurred vision. Do not drive, use machinery, or do anything that needs mental alertness until you know how this medicine affects you. To reduce dizzy or fainting spells, do not sit or stand up quickly, especially if you are an older patient. Alcohol can increase drowsiness and dizziness. Avoid alcoholic drinks.  Your mouth may get dry. Chewing sugarless gum or sucking hard candy, and drinking plenty of water will help.  The use of this medicine may increase the chance of suicidal thoughts or actions. Pay special attention to how you are responding while on this medicine. Any worsening of mood, or thoughts of suicide or dying should be reported to your health care professional right away.  Women who become pregnant while using this medicine may enroll in the [place] Antiepileptic Drug Pregnancy Registry by calling 1-485.416.4222. This registry collects information about the safety of antiepileptic drug use during pregnancy.  What side effects may I notice from receiving this  medicine?  Side effects that you should report to your doctor or health care professional as soon as possible:  -allergic reactions like skin rash, itching or hives, swelling of the face, lips, or tongue  -worsening of mood, thoughts or actions of suicide or dying  Side effects that usually do not require medical attention (report to your doctor or health care professional if they continue or are bothersome):  -constipation  -difficulty walking or controlling muscle movements  -dizziness  -nausea  -slurred speech  -tiredness  -tremors  -weight gain  This list may not describe all possible side effects. Call your doctor for medical advice about side effects. You may report side effects to FDA at 5-397-FDA-1731.  Where should I keep my medicine?  Keep out of reach of children.  This medicine may cause accidental overdose and death if it taken by other adults, children, or pets. Mix any unused medicine with a substance like cat litter or coffee grounds. Then throw the medicine away in a sealed container like a sealed bag or a coffee can with a lid. Do not use the medicine after the expiration date.  Store at room temperature between 15 and 30 degrees C (59 and 86 degrees F).  NOTE: This sheet is a summary. It may not cover all possible information. If you have questions about this medicine, talk to your doctor, pharmacist, or health care provider.  © 2018 [employer]/Gold Standard (2015-02-13 15:26:50)    Chest Pain Observation  It is often hard to give a specific diagnosis for the cause of chest pain. Among other possibilities your symptoms might be caused by inadequate oxygen delivery to your heart (angina). Angina that is not treated or evaluated can lead to a heart attack (myocardial infarction) or death.  Blood tests, electrocardiograms, and X-rays may have been done to help determine a possible cause of your chest pain. After evaluation and observation, your health care provider has determined that it is unlikely  your pain was caused by an unstable condition that requires hospitalization. However, a full evaluation of your pain may need to be completed, with additional diagnostic testing as directed. It is very important to keep your follow-up appointments. Not keeping your follow-up appointments could result in permanent heart damage, disability, or death. If there is any problem keeping your follow-up appointments, you must call your health care provider.  HOME CARE INSTRUCTIONS   Due to the slight chance that your pain could be angina, it is important to follow your health care provider's treatment plan and also maintain a healthy lifestyle:  · Maintain or work toward achieving a healthy weight.  · Stay physically active and exercise regularly.  · Decrease your salt intake.  · Eat a balanced, healthy diet. Talk to a dietitian to learn about heart-healthy foods.  · Increase your fiber intake by including whole grains, vegetables, fruits, and nuts in your diet.  · Avoid situations that cause stress, anger, or depression.  · Take medicines as advised by your health care provider. Report any side effects to your health care provider. Do not stop medicines or adjust the dosages on your own.  · Quit smoking. Do not use nicotine patches or gum until you check with your health care provider.  · Keep your blood pressure, blood sugar, and cholesterol levels within normal limits.  · Limit alcohol intake to no more than 1 drink per day for women who are not pregnant and 2 drinks per day for men.  · Do not abuse drugs.  SEEK IMMEDIATE MEDICAL CARE IF:  You have severe chest pain or pressure which may include symptoms such as:  · You feel pain or pressure in your arms, neck, jaw, or back.  · You have severe back or abdominal pain, feel sick to your stomach (nauseous), or throw up (vomit).  · You are sweating profusely.  · You are having a fast or irregular heartbeat.  · You feel short of breath while at rest.  · You notice increasing  shortness of breath during rest, sleep, or with activity.  · You have chest pain that does not get better after rest or after taking your usual medicine.  · You wake from sleep with chest pain.  · You are unable to sleep because you cannot breathe.  · You develop a frequent cough or you are coughing up blood.  · You feel dizzy, faint, or experience extreme fatigue.  · You develop severe weakness, dizziness, fainting, or chills.  Any of these symptoms may represent a serious problem that is an emergency. Do not wait to see if the symptoms will go away. Call your local emergency services (911 in the [place] Do not drive yourself to the hospital.  MAKE SURE YOU:  · Understand these instructions.  · Will watch your condition.  · Will get help right away if you are not doing well or get worse.     This information is not intended to replace advice given to you by your health care provider. Make sure you discuss any questions you have with your health care provider.     Document Released: 01/20/2012 Document Revised: 12/23/2014 Document Reviewed: 06/19/2014  [employer] Patient Education ©2016 [employer]

## 2025-05-29 VITALS
SYSTOLIC BLOOD PRESSURE: 145 MMHG | RESPIRATION RATE: 18 BRPM | TEMPERATURE: 97.9 F | HEIGHT: 65 IN | DIASTOLIC BLOOD PRESSURE: 77 MMHG | OXYGEN SATURATION: 93 % | WEIGHT: 132.4 LBS | HEART RATE: 74 BPM | BODY MASS INDEX: 22.06 KG/M2

## 2025-05-29 LAB
ANION GAP SERPL CALCULATED.3IONS-SCNC: 11 MMOL/L (ref 3–16)
BASOPHILS # BLD: 0.1 K/UL (ref 0–0.2)
BASOPHILS NFR BLD: 2.4 %
BUN SERPL-MCNC: 16 MG/DL (ref 7–20)
CALCIUM SERPL-MCNC: 8.3 MG/DL (ref 8.3–10.6)
CHLORIDE SERPL-SCNC: 104 MMOL/L (ref 99–110)
CO2 SERPL-SCNC: 22 MMOL/L (ref 21–32)
CREAT SERPL-MCNC: 1 MG/DL (ref 0.6–1.2)
DEPRECATED RDW RBC AUTO: 12.4 % (ref 12.4–15.4)
EOSINOPHIL # BLD: 0.2 K/UL (ref 0–0.6)
EOSINOPHIL NFR BLD: 4.6 %
GFR SERPLBLD CREATININE-BSD FMLA CKD-EPI: 55 ML/MIN/{1.73_M2}
GLUCOSE SERPL-MCNC: 93 MG/DL (ref 70–99)
HCT VFR BLD AUTO: 32.1 % (ref 36–48)
HGB BLD-MCNC: 11.2 G/DL (ref 12–16)
LYMPHOCYTES # BLD: 1 K/UL (ref 1–5.1)
LYMPHOCYTES NFR BLD: 29.1 %
MCH RBC QN AUTO: 31.6 PG (ref 26–34)
MCHC RBC AUTO-ENTMCNC: 35.1 G/DL (ref 31–36)
MCV RBC AUTO: 90.2 FL (ref 80–100)
MONOCYTES # BLD: 0.5 K/UL (ref 0–1.3)
MONOCYTES NFR BLD: 13.6 %
NEUTROPHILS # BLD: 1.7 K/UL (ref 1.7–7.7)
NEUTROPHILS NFR BLD: 50.3 %
PLATELET # BLD AUTO: 257 K/UL (ref 135–450)
PMV BLD AUTO: 6.6 FL (ref 5–10.5)
POTASSIUM SERPL-SCNC: 4.4 MMOL/L (ref 3.5–5.1)
RBC # BLD AUTO: 3.55 M/UL (ref 4–5.2)
SODIUM SERPL-SCNC: 135 MMOL/L (ref 136–145)
SODIUM SERPL-SCNC: 137 MMOL/L (ref 136–145)
WBC # BLD AUTO: 3.5 K/UL (ref 4–11)

## 2025-05-29 PROCEDURE — 2580000003 HC RX 258

## 2025-05-29 PROCEDURE — 6370000000 HC RX 637 (ALT 250 FOR IP)

## 2025-05-29 PROCEDURE — 85025 COMPLETE CBC W/AUTO DIFF WBC: CPT

## 2025-05-29 PROCEDURE — 80048 BASIC METABOLIC PNL TOTAL CA: CPT

## 2025-05-29 PROCEDURE — 6360000002 HC RX W HCPCS

## 2025-05-29 PROCEDURE — 84295 ASSAY OF SERUM SODIUM: CPT

## 2025-05-29 PROCEDURE — 36415 COLL VENOUS BLD VENIPUNCTURE: CPT

## 2025-05-29 RX ORDER — CEFDINIR 300 MG/1
300 CAPSULE ORAL 2 TIMES DAILY
Qty: 4 CAPSULE | Refills: 0 | Status: SHIPPED | OUTPATIENT
Start: 2025-05-30 | End: 2025-06-01

## 2025-05-29 RX ORDER — GUAIFENESIN 600 MG/1
600 TABLET, EXTENDED RELEASE ORAL 2 TIMES DAILY
COMMUNITY
Start: 2025-05-29

## 2025-05-29 RX ORDER — LACTOBACILLUS RHAMNOSUS GG 10B CELL
1 CAPSULE ORAL
COMMUNITY
Start: 2025-05-30

## 2025-05-29 RX ADMIN — Medication 1 CAPSULE: at 09:47

## 2025-05-29 RX ADMIN — ENOXAPARIN SODIUM 40 MG: 100 INJECTION SUBCUTANEOUS at 09:47

## 2025-05-29 RX ADMIN — LISINOPRIL 10 MG: 10 TABLET ORAL at 09:47

## 2025-05-29 RX ADMIN — ATORVASTATIN CALCIUM 20 MG: 10 TABLET, FILM COATED ORAL at 09:47

## 2025-05-29 RX ADMIN — AZITHROMYCIN DIHYDRATE 500 MG: 250 TABLET ORAL at 11:51

## 2025-05-29 RX ADMIN — Medication: at 05:55

## 2025-05-29 RX ADMIN — GUAIFENESIN 600 MG: 600 TABLET, EXTENDED RELEASE ORAL at 09:47

## 2025-05-29 RX ADMIN — SODIUM CHLORIDE 1000 MG: 9 INJECTION, SOLUTION INTRAVENOUS at 11:51

## 2025-05-29 NOTE — PROGRESS NOTES
/68   Pulse 82   Temp 97.7 °F (36.5 °C) (Oral)   Resp 18   Ht 1.651 m (5' 5\")   Wt 60.1 kg (132 lb 6.4 oz)   SpO2 95%   BMI 22.03 kg/m²     Patient alert and oriented resting in bed, watching tv. With telemetry. On room air. Assessment completed. Respiration easy, even and unlabored. Patient tolerated night meds well. Call light and bedside table within reach. Bed at lowest position, locked, side rails x2, bed alarm on. Pt denies needs at this time.      
Consult has been added to Deandra Tafoya RN  on 5/27/25. 4:16 PM    Caron Posey  5/27/2025  
[facility] Progress Note    Daily Progress Note for 2025 8:13 PM /0313-01  Adriana Izquierdo : 1940 Age: 84 y.o. Sex: female  Length of Stay:  1    Interval History:      CC: F/U Respiratory Distress (At [facility] told she has a touch of pneumonia and bronchitis.  Meds giving diarrhea, can't sleep. )    Subjective:       Still has cough and congestion but feeling better. No diarrhea.     Objective:     Vitals:    25 0759 25 1039 25 1620 25   BP: (!) 149/74 111/72 125/66 126/70   Pulse: 73 73 78 75   Resp: 18 18 16 18   Temp: 97.7 °F (36.5 °C) 97.7 °F (36.5 °C) 97.6 °F (36.4 °C) 97.5 °F (36.4 °C)   TempSrc: Oral Oral Oral Oral   SpO2: 95% 93% 98% 96%   Weight:       Height:              Intake/Output Summary (Last 24 hours) at 2025  Last data filed at 2025 1623  Gross per 24 hour   Intake 1403.75 ml   Output 1750 ml   Net -346.25 ml     Body mass index is 22.03 kg/m².    Physical Exam:  General: Cooperative, pleasant/  HEENT:  Head: normocephalic,atraumatic, anicteric sclera, clear conjunctiva  Neck: Normal size, Jugular venous pulsations: normal  Respiratory:unlabored breathing, clear to auscultation with no crackles, wheezes rhonchi  Heart: Regular rate and rhythm, S1, S2-normal, No murmurs  Abdomen: soft, nondistended, nontender, normoactive bowel sounds,  Neurological/Psych: Alert and oriented times three, no focal neurological deficits, Mood and affect appropriate.  Skin: No obvious rashes    Extremities:  no edema, Pedal pulses 2+ bilaterally    Scheduled Medications:  sodium chloride flush, 5-40 mL, 2 times per day  enoxaparin, 40 mg, Daily  guaiFENesin, 600 mg, BID  cefTRIAXone (ROCEPHIN) IV, 1,000 mg, Q24H   And  azithromycin, 500 mg, Q24H  lactobacillus, 1 capsule, Daily with breakfast  atorvastatin, 20 mg, Daily  lisinopril, 10 mg, Daily        PRN Medications:  sodium chloride flush, 5-40 mL, PRN  sodium chloride, , 
Notified by [facility] pt with run on [place] at this time-state EKG done NSR due for labs recheck.Pt update on plan of care  
Patient admitted to room 313 from ER. Patient oriented to room, call light, bed rails, phone, lights and bathroom. Patient instructed about the schedule of the day including: vital sign frequency, lab draws, possible tests, frequency of MD and staff rounds, daily weights, I &O's and prescribed diet. REg Telemetry box in place, patient aware of placement and reason. Bed locked, in lowest position, side rails up 2/4, call light within reach.        Recliner Assessment  Patient is able to demonstrate the ability to move from a reclining position to an upright position within the recliner.       4 Eyes Skin Assessment     NAME:  Adriana Izquierdo  YOB: 1940  MEDICAL RECORD NUMBER:  [id number]    The patient is being assessed for  Admission    I agree that at least one RN has performed a thorough Head to Toe Skin Assessment on the patient. ALL assessment sites listed below have been assessed.      Areas assessed by both nurses:    Head, Face, Ears, Shoulders, Back, Chest, Arms, Elbows, Hands, Sacrum. Buttock, Coccyx, Ischium, Legs. Feet and Heels, and Under Medical Devices         Does the Patient have a Wound? No noted wound(s)       Kyle Prevention initiated by RN: No  Wound Care Orders initiated by RN: No    Pressure Injury (Stage 3,4, Unstageable, DTI, NWPT, and Complex wounds) if present, place Wound referral order by RN under : No    New Ostomies, if present place, Ostomy referral order under : No     Nurse 1 eSignature: Electronically signed by Kindra Durham RN on 5/27/25 at 3:12 PM EDT    **SHARE this note so that the co-signing nurse can place an eSignature**    Nurse 2 eSignature: Electronically signed by Erika Yeh RN on 5/27/25 at 6:17 PM EDT   
Patient educated on discharge instructions as well as new medications use, dosage, administration and possible side effects.  Patient verified knowledge. IV removed without difficulty and dry dressing in place. Telemetry monitor removed and returned to [facility]. Pt left facility in stable condition to Home with all of their personal belongings.    
Patient is awake on bed. Still with Telemetry. Still with IV infusion regulated via alaris. Respiration regular, unlabored. Routine morning care done. Needs attended. Call light and bedside table within reach. Bed at lowest position, locked, side rails x2, bed alarm on.    
Patient is sleeping on bed but easily to wake up.  Still with Telemetry.  No distress noted at this time.  Will continue to monitor patient.  Denies need at this time.  Call light and bedside table within reach. Bed at lowest position, locked, side rails x2, bed alarm on.    
Patient is sleeping on bed but easily to wake up. Still with Telemetry.  Respiration regular, unlabored. Routine morning care done. Needs attended. Call light and bedside table within reach. Bed at lowest position, locked, side rails x2, bed alarm on.    
Report given to Liam LAW Pt. Stable. Care transferred at this time.   
Transferred care to ANI Moseley. Face to face bedside report given, no need voiced at this time. Pt awake in bed.   No signs of distress noted.  Call light within reach.   Denies needs.    
Transferred care to ANI Verde. Face to face bedside report given, no need voiced at this time.  Pt awake in bed.   No signs of distress noted.  Call light within reach.   Denies needs.    
31.7* 32.1*   MCV 90.4 90.6 90.2    224 257     Recent Labs     05/27/25  1905 05/28/25  0303 05/28/25  1126 05/28/25  1906 05/29/25  0321   * 133* 136 135* 137   K 4.3 4.1  --   --  4.4    102  --   --  104   CO2 23 23  --   --  22   GLUCOSE 104* 88  --   --  93   MG 1.85  --   --   --   --    BUN 20 18  --   --  16   CREATININE 1.0 1.0  --   --  1.0   LABGLOM 55* 55*  --   --  55*       Please do not hesitate to contact me if you have any questions regarding the patients care or my recommendations.  If you need assistance after 5 pm, please call my office ([phone]) for the on call nephrologist  __________________________________________________________________  Jae Haq MD, MINERVA  [facility]

## 2025-05-29 NOTE — PLAN OF CARE
Problem: Safety - Adult  Goal: Free from fall injury  Outcome: Progressing  Flowsheets (Taken 5/28/2025 2245)  Free From Fall Injury: Instruct family/caregiver on patient safety     Problem: Neurosensory - Adult  Goal: Absence of seizures  Outcome: Progressing  Flowsheets (Taken 5/28/2025 2246)  Absence of seizures: Monitor for seizure activity.  If seizure occurs, document type and location of movements and any associated apnea

## 2025-05-29 NOTE — FLOWSHEET NOTE
05/29/25 0705   Vital Signs   Temp 97.5 °F (36.4 °C)   Temp Source Axillary   Pulse 74   Heart Rate Source Monitor   Respirations 19   BP (!) 166/71   MAP (Calculated) 103   BP Location Left upper arm   BP Method Automatic   Patient Position Sitting   Oxygen Therapy   SpO2 97 %   O2 Device None (Room air)     Shift assessment completed. See flow sheet. Medications given. Patient is A&O x4. Vitals are stable. Patient denies further needs. Call light within reach.

## 2025-05-29 NOTE — DISCHARGE INSTRUCTIONS
Please make an appointment with your primary care provider to make sure you are continuing to recover well.

## 2025-05-29 NOTE — FLOWSHEET NOTE
/70   Pulse 75   Temp 97.5 °F (36.4 °C) (Oral)   Resp 18   Ht 1.651 m (5' 5\")   Wt 60.1 kg (132 lb 6.4 oz)   SpO2 96%   BMI 22.03 kg/m²     Patient alert and oriented resting in bed. With telemetry. On room air.  Assessment completed. Respiration easy, even and unlabored. Patient tolerated night meds well. Call light and bedside table within reach. Bed at lowest position, locked, side rails x2, bed alarm on. Pt denies needs at this time.

## 2025-05-29 NOTE — CONSULTS
[facility]  Palliative Medicine Consultation Note      Date Of Admission:5/27/2025  Date of consult: 05/29/25  Seen by PC in the past:  No    Recommendations:        Writer met with the pt at bedside to introduce palliative/hospice options and had a voluntary discussion related to advance care planning. Palliative care consulted for goals of care and code status discussion. Code status discussed in detail with the pt and pt would like time to consider any changes. Pt remains Full Code at this time. Pt plans return home with family living two doors down and grandson to stay with the pt for the summer months. Pt declines HTP or palliative care to follow along.    Message sent to FELICIA Rai related to above conversation.      1. Goals of Care/Advanced Care planning/Code status: Full code  2. Pain: denies  3. SOB: denies  4. Disposition: return home with grandson    Reason for Consult:         [x]  Goals of Care  []  Code Status Discussion/Advanced Care Planning   []  Psychosocial/Family Support  []  Symptom Management  []  Other (Specify)    Requesting Physician: Dr. Betty Jenkins    CHIEF COMPLAINT:  Hyponatremia,Diarrhea    History Obtained From:  electronic medical record    History of Present Illness:         Adriana Izquierdo is a 84 y.o. female with PMH of (see below list) who presented  to the ED past medical history of hypertension hyperlipidemia presenting with cough for the last 4 days.  She denies chest pain or shortness of breath at rest but states when she gets in a coughing fit it takes her breath away.  Denies fever, chills, nausea, vomiting.  Does have general malaise.  Sick contact with other family member last week with similar symptoms.  Has been taking over-the-counter cough medicine for symptoms .    Subjective:         Past Medical History:        Diagnosis Date    Anesthesia complication     \" thrashed about\"    Arthritis     Cancer (HCC)     left breast    COVID-19 12/01/2020

## 2025-05-29 NOTE — DISCHARGE SUMMARY
Hospital Medicine Discharge Summary    Patient: Adriana Izquierdo     Gender: female  : 1940   Age: 84 y.o.  MRN: [id number]    Admitting Physician: Juvencio Perry MD  Discharge Physician: Betty Jenkins,     Code Status: Full Code     Admit Date: 2025   Discharge Date: 2025    Discharge Diagnoses:    Active Hospital Problems    Diagnosis Date Noted    Diarrhea [R19.7] 2025    Hyponatremia [E87.1] 2025    Multifocal pneumonia [J18.9] 2020    HTN (hypertension) [I10] 2012    HLD (hyperlipidemia) [E78.5] 2012     Condition at Discharge: Stable    Hospital Course:     Hyponatremia.  -Na 124, decreased from 132 on , then back up to 133 rapidly, repeat stable however 133, stopped normal saline -> 137, stable.   -Nephrology consulted for further recs  -likely in the setting of GI losses from diarrhea as well as decreased oral intake over the last 2-3 days.  -Na q8 hours.  -urine/serum osmol, urine sodium - urine sodium < 20     Multifocal pneumonia.  -failed outpatient tx for right-sided pneumonia with doxycycline.  -CXR from  showed right-sided pneumonia, today showing multifocal pneumonia.  -afebrile, no leukocytosis, VSS, normal lactic and procal.  -stable on RA.  -sputum culture, strep/legionella antigen, pneumonia panel - negative  -probiotic initiated.  -IV Rocephin and azithromycin.  -sputum culture in process     Diarrhea.  -suspect 2/2 doxycycline.  -c diff, giardia, rotavirus, GI PCR - pending. However patient had no further diarrhea.   -IVF per above.  -supportive care.      Hypertension.  -continue home lisinopril.      Hyperlipidemia.   -continue statin.      Bilateral vitreous opacities & brow ptosis.   -follows with ophthalmology.      Hx breast cancer.  -initial hx .  -s/p CMF, s/p tamoxifen.      Additional findings or notes to primary provider:  None at this time    Discharge Medications:   Current Discharge Medication List        START

## 2025-05-29 NOTE — FLOWSHEET NOTE
05/29/25 1100   Vital Signs   Temp 97.9 °F (36.6 °C)   Temp Source Oral   Pulse 74   Heart Rate Source Monitor   Respirations 18   BP (!) 145/77   MAP (Calculated) 100   BP Location Left upper arm   BP Method Automatic   Patient Position Sitting   Oxygen Therapy   SpO2 93 %   O2 Device None (Room air)     Patient remains awake and alert. Call light within reach.   PEDIATRICS PICU

## 2025-05-30 LAB
BACTERIA SPEC RESP CULT: NORMAL
GRAM STN SPEC: NORMAL

## 2025-05-31 LAB
BACTERIA BLD CULT ORG #2: NORMAL
BACTERIA BLD CULT: NORMAL

## 2025-06-15 ENCOUNTER — APPOINTMENT (OUTPATIENT)
Dept: GENERAL RADIOLOGY | Age: 85
DRG: 392 | End: 2025-06-15
Payer: MEDICARE

## 2025-06-15 ENCOUNTER — HOSPITAL ENCOUNTER (INPATIENT)
Age: 85
LOS: 1 days | Discharge: HOME | DRG: 392 | End: 2025-06-18
Attending: EMERGENCY MEDICINE | Admitting: INTERNAL MEDICINE
Payer: MEDICARE

## 2025-06-15 DIAGNOSIS — R00.2 PALPITATIONS: ICD-10-CM

## 2025-06-15 DIAGNOSIS — R79.89 ELEVATED TROPONIN: ICD-10-CM

## 2025-06-15 DIAGNOSIS — I10 HYPERTENSION, UNSPECIFIED TYPE: ICD-10-CM

## 2025-06-15 DIAGNOSIS — R07.89 ATYPICAL CHEST PAIN: Primary | ICD-10-CM

## 2025-06-15 LAB
ALBUMIN SERPL-MCNC: 3.9 G/DL (ref 3.4–5)
ALBUMIN/GLOB SERPL: 1.6 {RATIO} (ref 1.1–2.2)
ALP SERPL-CCNC: 92 U/L (ref 40–129)
ALT SERPL-CCNC: 33 U/L (ref 10–40)
ANION GAP SERPL CALCULATED.3IONS-SCNC: 10 MMOL/L (ref 3–16)
ANION GAP SERPL CALCULATED.3IONS-SCNC: 11 MMOL/L (ref 3–16)
AST SERPL-CCNC: 25 U/L (ref 15–37)
BASOPHILS # BLD: 0 K/UL (ref 0–0.2)
BASOPHILS NFR BLD: 0.9 %
BILIRUB SERPL-MCNC: 0.3 MG/DL (ref 0–1)
BUN SERPL-MCNC: 11 MG/DL (ref 7–20)
BUN SERPL-MCNC: 12 MG/DL (ref 7–20)
CALCIUM SERPL-MCNC: 8.6 MG/DL (ref 8.3–10.6)
CALCIUM SERPL-MCNC: 8.8 MG/DL (ref 8.3–10.6)
CHLORIDE SERPL-SCNC: 93 MMOL/L (ref 99–110)
CHLORIDE SERPL-SCNC: 96 MMOL/L (ref 99–110)
CHOLEST SERPL-MCNC: 130 MG/DL (ref 0–199)
CO2 SERPL-SCNC: 23 MMOL/L (ref 21–32)
CO2 SERPL-SCNC: 24 MMOL/L (ref 21–32)
CREAT SERPL-MCNC: 0.9 MG/DL (ref 0.6–1.2)
CREAT SERPL-MCNC: 0.9 MG/DL (ref 0.6–1.2)
D-DIMER QUANTITATIVE: 0.44 UG/ML FEU (ref 0–0.6)
DEPRECATED RDW RBC AUTO: 12.3 % (ref 12.4–15.4)
DEPRECATED RDW RBC AUTO: 12.6 % (ref 12.4–15.4)
EKG ATRIAL RATE: 60 BPM
EKG DIAGNOSIS: NORMAL
EKG P AXIS: 81 DEGREES
EKG P-R INTERVAL: 154 MS
EKG Q-T INTERVAL: 398 MS
EKG QRS DURATION: 80 MS
EKG QTC CALCULATION (BAZETT): 398 MS
EKG R AXIS: 43 DEGREES
EKG T AXIS: 72 DEGREES
EKG VENTRICULAR RATE: 60 BPM
EOSINOPHIL # BLD: 0.2 K/UL (ref 0–0.6)
EOSINOPHIL NFR BLD: 3.7 %
GFR SERPLBLD CREATININE-BSD FMLA CKD-EPI: 63 ML/MIN/{1.73_M2}
GFR SERPLBLD CREATININE-BSD FMLA CKD-EPI: 63 ML/MIN/{1.73_M2}
GLUCOSE SERPL-MCNC: 102 MG/DL (ref 70–99)
GLUCOSE SERPL-MCNC: 103 MG/DL (ref 70–99)
HCT VFR BLD AUTO: 34.8 % (ref 36–48)
HCT VFR BLD AUTO: 35.5 % (ref 36–48)
HDLC SERPL-MCNC: 54 MG/DL (ref 40–60)
HGB BLD-MCNC: 12.1 G/DL (ref 12–16)
HGB BLD-MCNC: 12.2 G/DL (ref 12–16)
LDLC SERPL CALC-MCNC: 63 MG/DL
LYMPHOCYTES # BLD: 1 K/UL (ref 1–5.1)
LYMPHOCYTES NFR BLD: 22.7 %
MAGNESIUM SERPL-MCNC: 1.84 MG/DL (ref 1.8–2.4)
MCH RBC QN AUTO: 31.1 PG (ref 26–34)
MCH RBC QN AUTO: 31.5 PG (ref 26–34)
MCHC RBC AUTO-ENTMCNC: 34.1 G/DL (ref 31–36)
MCHC RBC AUTO-ENTMCNC: 34.9 G/DL (ref 31–36)
MCV RBC AUTO: 90.2 FL (ref 80–100)
MCV RBC AUTO: 91.2 FL (ref 80–100)
MONOCYTES # BLD: 0.6 K/UL (ref 0–1.3)
MONOCYTES NFR BLD: 14 %
NEUTROPHILS # BLD: 2.6 K/UL (ref 1.7–7.7)
NEUTROPHILS NFR BLD: 58.7 %
PHOSPHATE SERPL-MCNC: 3.4 MG/DL (ref 2.5–4.9)
PLATELET # BLD AUTO: 321 K/UL (ref 135–450)
PLATELET # BLD AUTO: 366 K/UL (ref 135–450)
PMV BLD AUTO: 6.3 FL (ref 5–10.5)
PMV BLD AUTO: 6.4 FL (ref 5–10.5)
POTASSIUM SERPL-SCNC: 4.4 MMOL/L (ref 3.5–5.1)
POTASSIUM SERPL-SCNC: 4.5 MMOL/L (ref 3.5–5.1)
PROT SERPL-MCNC: 6.4 G/DL (ref 6.4–8.2)
RBC # BLD AUTO: 3.86 M/UL (ref 4–5.2)
RBC # BLD AUTO: 3.89 M/UL (ref 4–5.2)
SODIUM SERPL-SCNC: 128 MMOL/L (ref 136–145)
SODIUM SERPL-SCNC: 129 MMOL/L (ref 136–145)
TRIGL SERPL-MCNC: 63 MG/DL (ref 0–150)
TROPONIN, HIGH SENSITIVITY: 15 NG/L (ref 0–14)
TROPONIN, HIGH SENSITIVITY: 15 NG/L (ref 0–14)
TROPONIN, HIGH SENSITIVITY: 16 NG/L (ref 0–14)
TROPONIN, HIGH SENSITIVITY: 17 NG/L (ref 0–14)
TROPONIN, HIGH SENSITIVITY: 18 NG/L (ref 0–14)
VLDLC SERPL CALC-MCNC: 13 MG/DL
WBC # BLD AUTO: 4.4 K/UL (ref 4–11)
WBC # BLD AUTO: 4.6 K/UL (ref 4–11)

## 2025-06-15 PROCEDURE — 84484 ASSAY OF TROPONIN QUANT: CPT

## 2025-06-15 PROCEDURE — 83036 HEMOGLOBIN GLYCOSYLATED A1C: CPT

## 2025-06-15 PROCEDURE — 6370000000 HC RX 637 (ALT 250 FOR IP): Performed by: STUDENT IN AN ORGANIZED HEALTH CARE EDUCATION/TRAINING PROGRAM

## 2025-06-15 PROCEDURE — G0378 HOSPITAL OBSERVATION PER HR: HCPCS

## 2025-06-15 PROCEDURE — 96374 THER/PROPH/DIAG INJ IV PUSH: CPT

## 2025-06-15 PROCEDURE — 2500000003 HC RX 250 WO HCPCS: Performed by: STUDENT IN AN ORGANIZED HEALTH CARE EDUCATION/TRAINING PROGRAM

## 2025-06-15 PROCEDURE — 85025 COMPLETE CBC W/AUTO DIFF WBC: CPT

## 2025-06-15 PROCEDURE — 96372 THER/PROPH/DIAG INJ SC/IM: CPT

## 2025-06-15 PROCEDURE — 36415 COLL VENOUS BLD VENIPUNCTURE: CPT

## 2025-06-15 PROCEDURE — 85027 COMPLETE CBC AUTOMATED: CPT

## 2025-06-15 PROCEDURE — 84100 ASSAY OF PHOSPHORUS: CPT

## 2025-06-15 PROCEDURE — 93005 ELECTROCARDIOGRAM TRACING: CPT | Performed by: EMERGENCY MEDICINE

## 2025-06-15 PROCEDURE — 71046 X-RAY EXAM CHEST 2 VIEWS: CPT

## 2025-06-15 PROCEDURE — 96376 TX/PRO/DX INJ SAME DRUG ADON: CPT

## 2025-06-15 PROCEDURE — 99285 EMERGENCY DEPT VISIT HI MDM: CPT

## 2025-06-15 PROCEDURE — 83735 ASSAY OF MAGNESIUM: CPT

## 2025-06-15 PROCEDURE — 80061 LIPID PANEL: CPT

## 2025-06-15 PROCEDURE — 80053 COMPREHEN METABOLIC PANEL: CPT

## 2025-06-15 PROCEDURE — 6360000002 HC RX W HCPCS: Performed by: STUDENT IN AN ORGANIZED HEALTH CARE EDUCATION/TRAINING PROGRAM

## 2025-06-15 PROCEDURE — 85379 FIBRIN DEGRADATION QUANT: CPT

## 2025-06-15 RX ORDER — ASPIRIN 81 MG/1
81 TABLET, CHEWABLE ORAL DAILY
Status: DISCONTINUED | OUTPATIENT
Start: 2025-06-15 | End: 2025-06-18 | Stop reason: HOSPADM

## 2025-06-15 RX ORDER — POTASSIUM CHLORIDE 7.45 MG/ML
10 INJECTION INTRAVENOUS PRN
Status: DISCONTINUED | OUTPATIENT
Start: 2025-06-15 | End: 2025-06-18 | Stop reason: HOSPADM

## 2025-06-15 RX ORDER — ATORVASTATIN CALCIUM 40 MG/1
40 TABLET, FILM COATED ORAL NIGHTLY
Status: DISCONTINUED | OUTPATIENT
Start: 2025-06-15 | End: 2025-06-18 | Stop reason: HOSPADM

## 2025-06-15 RX ORDER — CLONIDINE HYDROCHLORIDE 0.1 MG/1
0.1 TABLET ORAL 2 TIMES DAILY
Status: ON HOLD | COMMUNITY
End: 2025-06-18 | Stop reason: HOSPADM

## 2025-06-15 RX ORDER — SODIUM CHLORIDE 0.9 % (FLUSH) 0.9 %
5-40 SYRINGE (ML) INJECTION PRN
Status: DISCONTINUED | OUTPATIENT
Start: 2025-06-15 | End: 2025-06-18 | Stop reason: HOSPADM

## 2025-06-15 RX ORDER — ONDANSETRON 4 MG/1
4 TABLET, ORALLY DISINTEGRATING ORAL EVERY 8 HOURS PRN
Status: DISCONTINUED | OUTPATIENT
Start: 2025-06-15 | End: 2025-06-15

## 2025-06-15 RX ORDER — POTASSIUM CHLORIDE 1500 MG/1
40 TABLET, EXTENDED RELEASE ORAL PRN
Status: DISCONTINUED | OUTPATIENT
Start: 2025-06-15 | End: 2025-06-18 | Stop reason: HOSPADM

## 2025-06-15 RX ORDER — SODIUM CHLORIDE 9 MG/ML
INJECTION, SOLUTION INTRAVENOUS PRN
Status: DISCONTINUED | OUTPATIENT
Start: 2025-06-15 | End: 2025-06-18 | Stop reason: HOSPADM

## 2025-06-15 RX ORDER — SODIUM CHLORIDE 0.9 % (FLUSH) 0.9 %
5-40 SYRINGE (ML) INJECTION EVERY 12 HOURS SCHEDULED
Status: DISCONTINUED | OUTPATIENT
Start: 2025-06-15 | End: 2025-06-18 | Stop reason: HOSPADM

## 2025-06-15 RX ORDER — POLYETHYLENE GLYCOL 3350 17 G/17G
17 POWDER, FOR SOLUTION ORAL DAILY PRN
Status: DISCONTINUED | OUTPATIENT
Start: 2025-06-15 | End: 2025-06-16

## 2025-06-15 RX ORDER — ACETAMINOPHEN 325 MG/1
650 TABLET ORAL EVERY 6 HOURS PRN
Status: DISCONTINUED | OUTPATIENT
Start: 2025-06-15 | End: 2025-06-18 | Stop reason: HOSPADM

## 2025-06-15 RX ORDER — ACETAMINOPHEN 650 MG/1
650 SUPPOSITORY RECTAL EVERY 6 HOURS PRN
Status: DISCONTINUED | OUTPATIENT
Start: 2025-06-15 | End: 2025-06-18 | Stop reason: HOSPADM

## 2025-06-15 RX ORDER — LISINOPRIL 10 MG/1
10 TABLET ORAL DAILY
Status: DISCONTINUED | OUTPATIENT
Start: 2025-06-15 | End: 2025-06-16

## 2025-06-15 RX ORDER — MAGNESIUM SULFATE IN WATER 40 MG/ML
2000 INJECTION, SOLUTION INTRAVENOUS PRN
Status: DISCONTINUED | OUTPATIENT
Start: 2025-06-15 | End: 2025-06-18 | Stop reason: HOSPADM

## 2025-06-15 RX ORDER — ENOXAPARIN SODIUM 100 MG/ML
40 INJECTION SUBCUTANEOUS DAILY
Status: DISCONTINUED | OUTPATIENT
Start: 2025-06-15 | End: 2025-06-18 | Stop reason: HOSPADM

## 2025-06-15 RX ORDER — ONDANSETRON 2 MG/ML
4 INJECTION INTRAMUSCULAR; INTRAVENOUS EVERY 6 HOURS PRN
Status: DISCONTINUED | OUTPATIENT
Start: 2025-06-15 | End: 2025-06-15

## 2025-06-15 RX ADMIN — ATORVASTATIN CALCIUM 40 MG: 40 TABLET, FILM COATED ORAL at 22:23

## 2025-06-15 RX ADMIN — LISINOPRIL 10 MG: 10 TABLET ORAL at 08:59

## 2025-06-15 RX ADMIN — SODIUM CHLORIDE, PRESERVATIVE FREE 10 ML: 5 INJECTION INTRAVENOUS at 22:24

## 2025-06-15 RX ADMIN — ASPIRIN 81 MG: 81 TABLET, CHEWABLE ORAL at 08:59

## 2025-06-15 RX ADMIN — SODIUM CHLORIDE, PRESERVATIVE FREE 10 ML: 5 INJECTION INTRAVENOUS at 09:05

## 2025-06-15 RX ADMIN — ENOXAPARIN SODIUM 40 MG: 100 INJECTION SUBCUTANEOUS at 08:58

## 2025-06-15 RX ADMIN — PANTOPRAZOLE SODIUM 40 MG: 40 INJECTION, POWDER, FOR SOLUTION INTRAVENOUS at 09:00

## 2025-06-15 ASSESSMENT — HEART SCORE: ECG: NORMAL

## 2025-06-15 NOTE — ED PROVIDER NOTES
[facility] EMERGENCY DEPARTMENT  EMERGENCY DEPARTMENT ENCOUNTER      Pt Name: Adriana Izquierdo  MRN: [id number]  Birthdate 1940  Date of evaluation: 6/15/2025  Provider: Morenita Thomson MD    CHIEF COMPLAINT       Chief Complaint   Patient presents with    Irregular Heart Beat     Patient states that it feel like her heart is going slow then fast, this started after taking her clonidine          HISTORY OF PRESENT ILLNESS   (Location/Symptom, Timing/Onset, Context/Setting, Quality, Duration, Modifying Factors, Severity)  Note limiting factors.   Adriana Izquierdo is a 84 y.o. female who presents to the emergency department with palpitations.  She states that that started after she started taking her clonidine tonight.  Patient was recently admitted to the hospital with a \"double pneumonia\" and discharged about 2 days ago.  No new medications.  Denies diaphoresis, shortness of breath, nausea, vomiting, leg swelling, calf pain.  She denies exacerbating or relieving factors.  No chest pain, but she is feeling a \"fluttering sensation\" in the epigastrium.      This patient is at risk for a communicable infection. Therefore, personal protection equipment consisting of a mask and gloves worn for the exam.     Nursing Notes were reviewed.    REVIEW OF SYSTEMS    (2-9 systems for level 4, 10 or more for level 5)     As per HPI    Except as noted above the remainder of the review of systems was reviewed and negative.       PAST MEDICAL HISTORY     Past Medical History:   Diagnosis Date    Anesthesia complication     \" thrashed about\"    Arthritis     Cancer (HCC)     left breast    COVID-19 12/01/2020    Hyperlipidemia     Hypertension     S/P chemotherapy, time since greater than 12 weeks     Wears dentures          SURGICAL HISTORY       Past Surgical History:   Procedure Laterality Date    BREAST SURGERY Left     mastectomy    BREAST SURGERY Bilateral     implant    CARPAL TUNNEL RELEASE Right 3/28/2023    RIGHT CARPAL

## 2025-06-15 NOTE — ED TRIAGE NOTES
Patient states that it feel like her heart is going slow then fast, this started after taking her clonidine

## 2025-06-15 NOTE — ED NOTES
Adriana Izquierdo is a 84 y.o. female admitted for  Principal Problem:    Palpitations  Resolved Problems:    * No resolved hospital problems. *  .   Patient Home via family with   Chief Complaint   Patient presents with    Irregular Heart Beat     Patient states that it feel like her heart is going slow then fast, this started after taking her clonidine    .  Patient is alert and Person place   Patient's baseline mobility: Baseline Mobility: Independent   Code Status: Full Code   Cardiac Rhythm:       Is patient on baseline Oxygen: no how many Liters  Abnormal Assessment Findings:     Isolation: None      NIH Score:    C-SSRS: Risk of Suicide: No Risk  Bedside swallow:        Active LDA's:   Peripheral IV 06/15/25 Right Antecubital (Active)   Site Assessment Clean, dry & intact 06/15/25 0250   Line Status Blood return noted;Normal saline locked;Specimen collected 06/15/25 0250     Patient admitted with a melara: no If the melara is chronic was it exchanged:  Reason for melara:   Patient admitted with Central Line:  . PICC line placement confirmed: YES OR NO:[id number]}   Reason for Central line:   Was central line Inserted from an outside facility:        Family/Caregiver Present yes Any Concerns: no   Restraints no  Sitter no         Vitals:      Vitals:    06/15/25 0600 06/15/25 0800 06/15/25 0900 06/15/25 1141   BP: (!) 152/60  123/79 (!) 153/73   Pulse: 75  66 67   Resp: 15  14 18   Temp:       TempSrc:       SpO2:  97% 98% 98%   Weight:       Height:           Last documented pain score (0-10 scale)    Pain medication administered Yes- see MAR.    Pertinent or High Risk Medications/Drips: No.    Pending Blood Product Administration: no    Abnormal labs:   Abnormal Labs Reviewed   COMPREHENSIVE METABOLIC PANEL W/ REFLEX TO MG FOR LOW K - Abnormal; Notable for the following components:       Result Value    Sodium 128 (*)     Chloride 93 (*)     Glucose 102 (*)     All other components within normal limits   CBC WITH AUTO

## 2025-06-15 NOTE — FLOWSHEET NOTE
06/15/25 1315   Vital Signs   Temp 97.7 °F (36.5 °C)   Temp Source Oral   Pulse 64   Heart Rate Source Monitor   Respirations 16   BP (!) 161/74   MAP (Calculated) 103   BP Location Right upper arm   BP Method Automatic   Patient Position Semi fowlers   Pain Assessment   Pain Assessment None - Denies Pain   Opioid-Induced Sedation   POSS Score 1   RASS   Posada Agitation Sedation Scale (RASS) 0   Oxygen Therapy   SpO2 99 %   O2 Device None (Room air)   Height and Weight   Weight - Scale 58.3 kg (128 lb 9.6 oz)   Weight Method Bed scale   BMI (Calculated) 21.4     Admission questions complete, home medications have been verified, and a head-to-toe assessment has been completed. Patient has been orientated to the unit and the room. Call light is in reach and has been explained. No further needs noted at this time. Will continue to monitor.

## 2025-06-15 NOTE — CARE COORDINATION
Case Management Assessment  Initial Evaluation    Date/Time of Evaluation: 6/15/2025 1:35 PM  Assessment Completed by: Leydi Hoyos RN    If patient is discharged prior to next notation, then this note serves as note for discharge by case management.    Patient Name: Adriana Izquierdo                   YOB: 1940  Diagnosis: Palpitations [R00.2]  Atypical chest pain [R07.89]                   Date / Time: 6/15/2025  2:31 AM    Patient Admission Status: Observation   Readmission Risk (Low < 19, Mod (19-27), High > 27): Readmission Risk Score: 9    Current PCP: Aura Kim MD  PCP verified by CM? Yes    Chart Reviewed: Yes      History Provided by: Patient  Patient Orientation: Alert and Oriented    Patient Cognition: Alert    Hospitalization in the last 30 days (Readmission):  No    If yes, Readmission Assessment in CM Navigator will be completed.    Advance Directives:      Code Status: Full Code   Patient's Primary Decision Maker is: Legal Next of Kin    Primary Decision Maker: Lazaro Taylor - Child - [phone]    Discharge Planning:    Patient lives with: Family Members Type of Home: House  Primary Care Giver: Self  Patient Support Systems include: Children, Family Members   Current Financial resources: Medicare  Current community resources: None  Current services prior to admission: None            Current DME:              Type of Home Care services:  None    ADLS  Prior functional level: Independent in ADLs/IADLs  Current functional level: Independent in ADLs/IADLs    PT AM-PAC:   /24  OT AM-PAC:   /24    Family can provide assistance at DC: Yes  Would you like Case Management to discuss the discharge plan with any other family members/significant others, and if so, who? No  Plans to Return to Present Housing: Yes  Other Identified Issues/Barriers to RETURNING to current housing: na    Potential Assistance needed at discharge: N/A            Potential DME:    Patient expects to discharge

## 2025-06-15 NOTE — ACP (ADVANCE CARE PLANNING)
Advance Care Planning     General Advance Care Planning (ACP) Conversation    Date of Conversation: 6/15/2025  Conducted with: Patient with Decision Making Capacity  Other persons present: Son Reynaldo    Healthcare Decision Maker:   Primary Decision Maker: Lazaro Taylor - Child - [phone]       Content/Action Overview:  DECLINED ACP Conversation - will revisit periodically  Reviewed DNR/DNI and patient elects Full Code (Attempt Resuscitation)        Length of Voluntary ACP Conversation in minutes:  <16 minutes (Non-Billable)    Leydi Hoyos RN

## 2025-06-15 NOTE — PROGRESS NOTES
Handoff report and transfer of care given at bedside to [facility] .  Patient in stable condition, denies needs/concerns at this time.  Call light within reach.

## 2025-06-15 NOTE — H&P
V2.0  History and Physical      Name:  Adriana Izquierdo /Age/Sex: 1940  (84 y.o. female)   MRN & CSN:  [id number] & [id number] Encounter Date/Time: 6/15/2025 4:45 AM EDT   Location:   PCP: Aura Kim MD       Hospital Day: 1    Assessment and Plan:   Adriana Izquierdo is a 84 y.o. female  who presents with Palpitations    Hospital Problems           Last Modified POA    * (Principal) Palpitations 6/15/2025 Yes         Assessment and Plan:  Palpitations with epigastric discomfort.  ACS rule out.  Heart score of 4.  Cardiology consulted n.p.o. at midnight for consideration of stress test in the morning.  Telemetry in place admitted under observation.  Started on aspirin and statin.  Benign essential hypertension on lisinopril  Recent admission for multifocal pneumonia resolved with the p.o. and IV antibiotics.  Last antibiotic was around a month ago  Hyperlipidemia on atorvastatin at home        Advance Care Planning    10 minutes were spent discussing the patient's resuscitation status, advance care planning, and end of life care with the patient and/or family/surrogate.    The patient and/or family/surrogate voluntarily agreed to participate in ACP services.    Patient's cognitive capacity: Alert and oriented X3    I answered all the patient/family questions that I could within the range and scope of the current medical situation.  We discussed the medical conditions, risks, benefits, outcomes, and goals of care at this time for the patient's medical issues at hand in the face of the patient's chronic issues and current presentation.    Summary of Discussion:     Outcome:    Home Meds Documented: [] Yes [x] Reconciled as much as possible (based on acuity) through chart review and patient discussion, need completion [] Needs Reconciled    Medical Decision Making:  The following items were considered in medical decision making:  Discussion of patient care with other providers  Reviewed clinical lab tests

## 2025-06-15 NOTE — PROGRESS NOTES
4 Eyes Skin Assessment     NAME:  Adriana Izquierdo  YOB: 1940  MEDICAL RECORD NUMBER:  [id number]    The patient is being assessed for  Admission    I agree that at least one RN has performed a thorough Head to Toe Skin Assessment on the patient. ALL assessment sites listed below have been assessed.      Areas assessed by both nurses:    Head, Face, Ears, Shoulders, Back, Chest, Arms, Elbows, Hands, and Legs. Feet and Heels        Does the Patient have a Wound? No noted wound(s)    Scattered bruises and abrasions, old scars (left breast mastectomy)       Kyle Prevention initiated by RN: No  Wound Care Orders initiated by RN: No    Pressure Injury (Stage 3,4, Unstageable, DTI, NWPT, and Complex wounds) if present, place Wound referral order by RN under : No    New Ostomies, if present place, Ostomy referral order under : No     Nurse 1 eSignature: Electronically signed by Sherri Espana RN (Mandy) on 6/15/25 at 1:46 PM EDT    **SHARE this note so that the co-signing nurse can place an eSignature**    Nurse 2 eSignature: Electronically signed by Sherri Pena RN on 6/15/25 at 1:56 PM EDT

## 2025-06-16 ENCOUNTER — APPOINTMENT (OUTPATIENT)
Age: 85
DRG: 392 | End: 2025-06-16
Attending: INTERNAL MEDICINE
Payer: MEDICARE

## 2025-06-16 ENCOUNTER — APPOINTMENT (OUTPATIENT)
Dept: CT IMAGING | Age: 85
DRG: 392 | End: 2025-06-16
Payer: MEDICARE

## 2025-06-16 PROBLEM — R07.89 ATYPICAL CHEST PAIN: Status: ACTIVE | Noted: 2025-06-16

## 2025-06-16 PROBLEM — R10.13 EPIGASTRIC PAIN: Status: ACTIVE | Noted: 2025-06-16

## 2025-06-16 PROBLEM — R79.89 ELEVATED TROPONIN: Status: ACTIVE | Noted: 2025-06-16

## 2025-06-16 PROBLEM — R10.30 LOWER ABDOMINAL PAIN: Status: ACTIVE | Noted: 2025-06-16

## 2025-06-16 LAB
ANION GAP SERPL CALCULATED.3IONS-SCNC: 10 MMOL/L (ref 3–16)
BUN SERPL-MCNC: 13 MG/DL (ref 7–20)
CALCIUM SERPL-MCNC: 8.9 MG/DL (ref 8.3–10.6)
CHLORIDE SERPL-SCNC: 99 MMOL/L (ref 99–110)
CO2 SERPL-SCNC: 26 MMOL/L (ref 21–32)
CREAT SERPL-MCNC: 1 MG/DL (ref 0.6–1.2)
DEPRECATED RDW RBC AUTO: 12.8 % (ref 12.4–15.4)
EST. AVERAGE GLUCOSE BLD GHB EST-MCNC: 122.6 MG/DL
GFR SERPLBLD CREATININE-BSD FMLA CKD-EPI: 55 ML/MIN/{1.73_M2}
GLUCOSE SERPL-MCNC: 89 MG/DL (ref 70–99)
HBA1C MFR BLD: 5.9 %
HCT VFR BLD AUTO: 37.1 % (ref 36–48)
HGB BLD-MCNC: 12.8 G/DL (ref 12–16)
MAGNESIUM SERPL-MCNC: 2.07 MG/DL (ref 1.8–2.4)
MCH RBC QN AUTO: 31.3 PG (ref 26–34)
MCHC RBC AUTO-ENTMCNC: 34.6 G/DL (ref 31–36)
MCV RBC AUTO: 90.4 FL (ref 80–100)
PHOSPHATE SERPL-MCNC: 3.4 MG/DL (ref 2.5–4.9)
PLATELET # BLD AUTO: 329 K/UL (ref 135–450)
PMV BLD AUTO: 6.3 FL (ref 5–10.5)
POTASSIUM SERPL-SCNC: 4.7 MMOL/L (ref 3.5–5.1)
RBC # BLD AUTO: 4.1 M/UL (ref 4–5.2)
SODIUM SERPL-SCNC: 135 MMOL/L (ref 136–145)
TROPONIN, HIGH SENSITIVITY: 17 NG/L (ref 0–14)
TROPONIN, HIGH SENSITIVITY: 17 NG/L (ref 0–14)
TROPONIN, HIGH SENSITIVITY: 21 NG/L (ref 0–14)
WBC # BLD AUTO: 3.9 K/UL (ref 4–11)

## 2025-06-16 PROCEDURE — 85027 COMPLETE CBC AUTOMATED: CPT

## 2025-06-16 PROCEDURE — 6370000000 HC RX 637 (ALT 250 FOR IP): Performed by: STUDENT IN AN ORGANIZED HEALTH CARE EDUCATION/TRAINING PROGRAM

## 2025-06-16 PROCEDURE — 93306 TTE W/DOPPLER COMPLETE: CPT

## 2025-06-16 PROCEDURE — 6370000000 HC RX 637 (ALT 250 FOR IP): Performed by: INTERNAL MEDICINE

## 2025-06-16 PROCEDURE — 99223 1ST HOSP IP/OBS HIGH 75: CPT | Performed by: INTERNAL MEDICINE

## 2025-06-16 PROCEDURE — 2500000003 HC RX 250 WO HCPCS: Performed by: STUDENT IN AN ORGANIZED HEALTH CARE EDUCATION/TRAINING PROGRAM

## 2025-06-16 PROCEDURE — G0378 HOSPITAL OBSERVATION PER HR: HCPCS | Performed by: NURSE PRACTITIONER

## 2025-06-16 PROCEDURE — 96372 THER/PROPH/DIAG INJ SC/IM: CPT

## 2025-06-16 PROCEDURE — G0378 HOSPITAL OBSERVATION PER HR: HCPCS

## 2025-06-16 PROCEDURE — 6360000004 HC RX CONTRAST MEDICATION: Performed by: NURSE PRACTITIONER

## 2025-06-16 PROCEDURE — 6370000000 HC RX 637 (ALT 250 FOR IP): Performed by: NURSE PRACTITIONER

## 2025-06-16 PROCEDURE — 99232 SBSQ HOSP IP/OBS MODERATE 35: CPT | Performed by: NURSE PRACTITIONER

## 2025-06-16 PROCEDURE — 74174 CTA ABD&PLVS W/CONTRAST: CPT

## 2025-06-16 PROCEDURE — 6360000002 HC RX W HCPCS: Performed by: STUDENT IN AN ORGANIZED HEALTH CARE EDUCATION/TRAINING PROGRAM

## 2025-06-16 PROCEDURE — 96376 TX/PRO/DX INJ SAME DRUG ADON: CPT

## 2025-06-16 PROCEDURE — 84484 ASSAY OF TROPONIN QUANT: CPT

## 2025-06-16 PROCEDURE — 80048 BASIC METABOLIC PNL TOTAL CA: CPT

## 2025-06-16 PROCEDURE — 84100 ASSAY OF PHOSPHORUS: CPT

## 2025-06-16 PROCEDURE — 36415 COLL VENOUS BLD VENIPUNCTURE: CPT

## 2025-06-16 PROCEDURE — 83735 ASSAY OF MAGNESIUM: CPT

## 2025-06-16 RX ORDER — LISINOPRIL 10 MG/1
10 TABLET ORAL ONCE
Status: COMPLETED | OUTPATIENT
Start: 2025-06-16 | End: 2025-06-16

## 2025-06-16 RX ORDER — AMLODIPINE BESYLATE 5 MG/1
5 TABLET ORAL DAILY
Status: DISCONTINUED | OUTPATIENT
Start: 2025-06-16 | End: 2025-06-18 | Stop reason: HOSPADM

## 2025-06-16 RX ORDER — POLYETHYLENE GLYCOL 3350 17 G/17G
17 POWDER, FOR SOLUTION ORAL 2 TIMES DAILY
Status: DISCONTINUED | OUTPATIENT
Start: 2025-06-16 | End: 2025-06-18 | Stop reason: HOSPADM

## 2025-06-16 RX ORDER — LISINOPRIL 20 MG/1
20 TABLET ORAL DAILY
Status: DISCONTINUED | OUTPATIENT
Start: 2025-06-17 | End: 2025-06-18 | Stop reason: HOSPADM

## 2025-06-16 RX ORDER — HYDROCHLOROTHIAZIDE 25 MG/1
25 TABLET ORAL DAILY
Status: DISCONTINUED | OUTPATIENT
Start: 2025-06-16 | End: 2025-06-16

## 2025-06-16 RX ORDER — SENNOSIDES 8.6 MG/1
1 TABLET ORAL 2 TIMES DAILY
Status: DISCONTINUED | OUTPATIENT
Start: 2025-06-16 | End: 2025-06-18 | Stop reason: HOSPADM

## 2025-06-16 RX ORDER — IOPAMIDOL 755 MG/ML
85 INJECTION, SOLUTION INTRAVASCULAR
Status: COMPLETED | OUTPATIENT
Start: 2025-06-16 | End: 2025-06-16

## 2025-06-16 RX ADMIN — ATORVASTATIN CALCIUM 40 MG: 40 TABLET, FILM COATED ORAL at 20:36

## 2025-06-16 RX ADMIN — POLYETHYLENE GLYCOL (3350) 17 G: 17 POWDER, FOR SOLUTION ORAL at 17:46

## 2025-06-16 RX ADMIN — PANTOPRAZOLE SODIUM 40 MG: 40 INJECTION, POWDER, FOR SOLUTION INTRAVENOUS at 08:44

## 2025-06-16 RX ADMIN — SODIUM CHLORIDE, PRESERVATIVE FREE 10 ML: 5 INJECTION INTRAVENOUS at 20:36

## 2025-06-16 RX ADMIN — IOPAMIDOL 85 ML: 755 INJECTION, SOLUTION INTRAVENOUS at 12:38

## 2025-06-16 RX ADMIN — SODIUM CHLORIDE, PRESERVATIVE FREE 5 ML: 5 INJECTION INTRAVENOUS at 09:45

## 2025-06-16 RX ADMIN — SENNOSIDES 8.6 MG: 8.6 TABLET, FILM COATED ORAL at 17:46

## 2025-06-16 RX ADMIN — LISINOPRIL 10 MG: 10 TABLET ORAL at 13:18

## 2025-06-16 RX ADMIN — LISINOPRIL 10 MG: 10 TABLET ORAL at 09:44

## 2025-06-16 RX ADMIN — AMLODIPINE BESYLATE 5 MG: 5 TABLET ORAL at 13:18

## 2025-06-16 RX ADMIN — ENOXAPARIN SODIUM 40 MG: 100 INJECTION SUBCUTANEOUS at 08:44

## 2025-06-16 RX ADMIN — ASPIRIN 81 MG: 81 TABLET, CHEWABLE ORAL at 09:44

## 2025-06-16 NOTE — CONSULTS
Carpal tunnel release (Right, 3/28/2023).     Social History:   reports that she has never smoked. She has never used smokeless tobacco. She reports that she does not drink alcohol and does not use drugs.     Family History:  family history includes Cancer in her father; Diabetes in her mother.     Home Medications:  Were reviewed and are listed in nursing record. and/or listed below  Prior to Admission medications    Medication Sig Start Date End Date Taking? Authorizing Provider   cloNIDine (CATAPRES) 0.1 MG tablet Take 1 tablet by mouth 2 times daily   Yes Viry Barker MD   atorvastatin (LIPITOR) 20 MG tablet Take 1 tablet by mouth daily 4/1/25  Yes Viry Barker MD   lisinopril (PRINIVIL;ZESTRIL) 10 MG tablet Take 1 tablet by mouth daily   Yes Viry Barker MD   lactobacillus (CULTURELLE) capsule Take 1 capsule by mouth daily (with breakfast) 5/30/25   Betty Jenkins DO   guaiFENesin (MUCINEX) 600 MG extended release tablet Take 1 tablet by mouth 2 times daily 5/29/25   Betty Jenkins DO   dextromethorphan-guaiFENesin (MUCINEX DM)  MG per extended release tablet Take 1 tablet by mouth every 12 hours as needed for Cough    Viry Barker MD   Coenzyme Q10 (CO Q 10) 100 MG CAPS Take 1 capsule by mouth daily   Patient not taking: Reported on 5/27/2025    Viry Barker MD   B Complex Vitamins (VITAMIN-B COMPLEX PO) Take 1 tablet by mouth daily.  Patient not taking: Reported on 5/27/2025    Viry Barker MD   calcium carbonate (OSCAL) 500 MG TABS tablet Take 1 tablet by mouth daily  Patient not taking: Reported on 5/27/2025    Viry Barker MD   therapeutic multivitamin-minerals (THERAGRAN-M) tablet Take 1 tablet by mouth daily  Patient not taking: Reported on 5/27/2025    Viry Barker MD        Allergies:  Promethazine hcl, Zofran [ondansetron hcl], Ondansetron hcl, and Morphine     Review of Systems:   12 point ROS negative in all areas as listed  abnormalities.   Follow telemetery. She c/o palps but no obvious arrhythmia thus far    Note HTN, elevated Alfie increases her morbidity and mortality requiring med tx and testing.     Patient Active Problem List   Diagnosis    HTN (hypertension)    HLD (hyperlipidemia)    Abnormal EKG    Primary localized osteoarthrosis, lower leg    Multifocal pneumonia    Hypoxia    Diarrhea due to COVID-19    Carpal tunnel syndrome of right wrist    Right carpal tunnel syndrome    Hyponatremia    Diarrhea    Palpitations      Thank you for allowing to us to participate in the care or chuck Inlow. Further evaluation will be based upon the patient's clinical course and testing results.

## 2025-06-16 NOTE — CONSULTS
Gastroenterology Consult Note    Patient:   Adriana Izquierdo   :    1940   Facility:   [facility]   Referring/PCP: Aura Kim MD  Date:     2025  Consultant:   SHANTA Murguia - CNP      Chief Complaint   Patient presents with    Irregular Heart Beat     Patient states that it feel like her heart is going slow then fast, this started after taking her clonidine         History of Present illness   Patient is an 83 yo female with pmx of HTN, HLD, and breast cancer s/p chemotherapy who presented to ED 6/15 with c/o high blood pressure. She reports heart palpitations at home. She reports taking her blood pressure at home, which was elevated. She reports vague, intermittent \"quivering of stomach.\" She reports \"fluttering\" of left lower quadrant. She reports her blood pressure becomes higher after this sensation. She denies change in bowel habits such as diarrhea, constipation, hematochezia. She denies acid reflux, dysphagia, nausea, vomiting, fever, chills, and medication changes. She reports tolerating regular diet. She currently denies pain. Cardiology has been consulted this admission with benign workup. She reports being diagnosed with pneumonia one month ago. She reports taking doxycycline. She was hospitalized 25 with pneumonia. She denies taking PPI. She reports her last colonoscopy was normal about 15 years ago. She denies having EGD. She denies NSAID, alcohol, tobacco, and marijuana use.     Past Medical History:   Diagnosis Date    Anesthesia complication     \" thrashed about\"    Arthritis     Cancer (HCC)     left breast    COVID-19 2020    Hyperlipidemia     Hypertension     S/P chemotherapy, time since greater than 12 weeks     Wears dentures      Past Surgical History:   Procedure Laterality Date    BREAST SURGERY Left     mastectomy    BREAST SURGERY Bilateral     implant    CARPAL TUNNEL RELEASE Right 3/28/2023    RIGHT CARPAL TUNNEL RELEASE

## 2025-06-16 NOTE — CARE COORDINATION
INTERDISCIPLINARY PLAN OF CARE CONFERENCE    Date/Time: 6/16/2025 11:21 AM  Completed by: Angelica Grover RN, Case Management      Patient Name:  Adriana Izquierdo  YOB: 1940  Admitting Diagnosis: Palpitations [R00.2]  Atypical chest pain [R07.89]     Admit Date/Time:  6/15/2025  2:31 AM    Chart reviewed. Interdisciplinary team contacted or reviewed plan related to patient progress and discharge plans.   Disciplines included Case Management, Nursing, and Dietitian.    Current Status:Stable  PT/OT recommendation for discharge plan of care: N/A    Expected D/C Disposition:  Home  Confirmed plan with patient  Yes   Met with:patient  Discharge Plan Comments: Reviewed chart and met with pt who cont plan to go home with grandson. States IPTA. Will follow for poss HHC needs at [place].    Home O2 in place on admit: No  Pt informed of need to bring portable home O2 tank on day of discharge for nursing to connect prior to leaving:  Not Indicated  Verbalized agreement/Understanding:  Not Indicated

## 2025-06-16 NOTE — FLOWSHEET NOTE
06/15/25 1936   Vital Signs   Temp 97.3 °F (36.3 °C)   Temp Source Oral   Pulse 65   Heart Rate Source Monitor   Respirations 16   /74   MAP (Calculated) 95   BP Location Right lower arm   BP Method Automatic   Patient Position Semi fowlers   Oxygen Therapy   SpO2 97 %   O2 Device None (Room air)     Patient A+Ox4, vitals and assessments done at this time. Bed in lowest position, call light within reach.

## 2025-06-16 NOTE — PROGRESS NOTES
Spiritual Health History and Assessment/Progress Note  [facility]    (P) Initial Encounter,  ,  ,      Name: Adriana Izquierdo MRN: [id number]    Age: 84 y.o.     Sex: female   Language: English   Mandaeism: Synagogue   Palpitations     Date: 6/16/2025            Total Time Calculated: (P) 45 min              Spiritual Assessment began in [address] MEDICAL-SURGICAL        Referral/Consult From: (P) Nurse   Encounter Overview/Reason: (P) Initial Encounter  Service Provided For: (P) Patient    Janell, Belief, Meaning:   Patient is connected with a janell tradition or spiritual practice  Family/Friends No family/friends present      Importance and Influence:  Patient has spiritual/personal beliefs that influence decisions regarding their health  Family/Friends No family/friends present    Community:  Patient is connected with a spiritual community  Family/Friends No family/friends present    Assessment and Plan of Care:     Patient Interventions include: Facilitated expression of thoughts and feelings, Explored spiritual coping/struggle/distress, Affirmed coping skills/support systems, and Provided sacramental/Anabaptism ritual  Family/Friends Interventions include: No family/friends present    Patient Plan of Care: Spiritual Care available upon further referral  Family/Friends Plan of Care: No family/friends present    Electronically signed by AAYUSH Zuniga on 6/16/2025 at 3:23 PM

## 2025-06-16 NOTE — PROGRESS NOTES
NUTRITION     Nutrition screening referral was triggered based on results obtained during nursing admission assessment for Unintentional Weight Loss and Decreased appetite. Patient has not had any significant weight loss since at least 2020. Patient reported adequate appetite and po intake PTA.     The patient's chart was reviewed and nutrition assessment is not indicated at this time.  Patient will be seen per nutrition standards of care.         Georgie Coto RD, LD   [phone]

## 2025-06-16 NOTE — PROGRESS NOTES
Progress Note    Admit Date:  6/15/2025    Admitted for palpitations   +abdominal pain with intermittent pulsating pain to abdomen     Subjective:  Ms. Izquierdo is resting in bed, stated some epigastric pain but more complaints of pulsating intermittent abdominal pain. Currently pain free.    Objective:   Patient Vitals for the past 4 hrs:   BP Temp Temp src Pulse Resp SpO2   06/16/25 0830 (!) 165/74 97.3 °F (36.3 °C) Oral 66 17 97 %          Intake/Output Summary (Last 24 hours) at 6/16/2025 1116  Last data filed at 6/15/2025 2224  Gross per 24 hour   Intake 5 ml   Output --   Net 5 ml       Physical Exam:    Gen: No distress. Alert. Pleasant elderly female, thin   Eyes: PERRL. No sclera icterus. No conjunctival injection.   ENT: No discharge. Pharynx clear.   Neck: No JVD.  Trachea midline.  Resp: No accessory muscle use. No crackles. No wheezes. No rhonchi.   CV: Regular rate. Regular rhythm. No murmur.  No rub. No edema.   Capillary Refill: Brisk,< 3 seconds   GI: Non-tender. Non-distended.  Normal bowel sounds.  Skin: Warm and dry. No nodule on exposed extremities. No rash on exposed extremities.   M/S: No cyanosis. No joint deformity. No clubbing.   Neuro: Awake. Grossly nonfocal    Psych: Oriented x 3. No anxiety or agitation.      Scheduled Meds:   [START ON 6/17/2025] lisinopril  20 mg Oral Daily    hydroCHLOROthiazide  25 mg Oral Daily    lisinopril  10 mg Oral Once    aluminum & magnesium hydroxide-simethicone (MAALOX PLUS) 30 mL, lidocaine viscous hcl (XYLOCAINE) 5 mL (GI COCKTAIL)   Oral Once    pantoprazole (PROTONIX) 40 mg in sodium chloride (PF) 0.9 % 10 mL injection  40 mg IntraVENous Daily    atorvastatin  40 mg Oral Nightly    aspirin  81 mg Oral Daily    sodium chloride flush  5-40 mL IntraVENous 2 times per day    enoxaparin  40 mg SubCUTAneous Daily       Continuous Infusions:   sodium chloride         PRN Meds:  sulfur hexafluoride microspheres, sodium chloride flush, sodium chloride, potassium  chloride **OR** potassium alternative oral replacement **OR** potassium chloride, magnesium sulfate, polyethylene glycol, acetaminophen **OR** acetaminophen      Data:  CBC:   Recent Labs     06/15/25  0247 06/15/25  0625 06/16/25  0542   WBC 4.4 4.6 3.9*   HGB 12.1 12.2 12.8   HCT 35.5* 34.8* 37.1   MCV 91.2 90.2 90.4    321 329     BMP:   Recent Labs     06/15/25  0247 06/15/25  0625 06/16/25  0542   * 129* 135*   K 4.4 4.5 4.7   CL 93* 96* 99   CO2 24 23 26   PHOS  --  3.4 3.4   BUN 12 11 13   CREATININE 0.9 0.9 1.0     LIVER PROFILE:   Recent Labs     06/15/25  0247   AST 25   ALT 33   BILITOT 0.3   ALKPHOS 92     PT/INR: No results for input(s): \"PROTIME\", \"INR\" in the last 72 hours.    CULTURES    Results       ** No results found for the last 336 hours. **            RADIOLOGY    XR CHEST (2 VW)   Final Result   Unchanged patchy opacity overlying the left lower lung zone likely represents   atelectasis versus scarring.              Assessment/Plan:    Palpitations  Epigastric Discomfort  Pulsating abdominal pain   - cardiology consulted  - started on aspirin and statin  - monitor on telemetry  - discussed with Dr. Damno, no stress test  - will check echo and further recommendations  - troponin flat   - cardiology deferred GI work-up to primary, will order GI consult   - discussed with cardiology will do CTA as she is complaining of intermittent pulsating abdominal pain and high blood pressure.       HTN- elevated   - cardiology increased Lisinopirl 10--20  - added HCTZ, although concerned with recent hyponatremia --discussed with Dr. Damon will add Norvasc instead of HCTZ  - monitor blood pressure and adjust regimen     Recent admission with hyponatremia  - noted to be 129 on admission---135 today  - monitor BMP    HLD  - Continue statin      Bilateral vitreous opacities & brow ptosis.   - follows with ophthalmology     Recent admission for multifocal PNA    Hx of breast cancer     Plan of care

## 2025-06-17 ENCOUNTER — APPOINTMENT (OUTPATIENT)
Dept: GENERAL RADIOLOGY | Age: 85
DRG: 392 | End: 2025-06-17
Payer: MEDICARE

## 2025-06-17 PROBLEM — R07.9 CHEST PAIN: Status: ACTIVE | Noted: 2025-06-17

## 2025-06-17 LAB
ANION GAP SERPL CALCULATED.3IONS-SCNC: 15 MMOL/L (ref 3–16)
BUN SERPL-MCNC: 16 MG/DL (ref 7–20)
CALCIUM SERPL-MCNC: 8.7 MG/DL (ref 8.3–10.6)
CHLORIDE SERPL-SCNC: 99 MMOL/L (ref 99–110)
CO2 SERPL-SCNC: 20 MMOL/L (ref 21–32)
CREAT SERPL-MCNC: 0.9 MG/DL (ref 0.6–1.2)
DEPRECATED RDW RBC AUTO: 13.3 % (ref 12.4–15.4)
ECHO BSA: 1.63 M2
ECHO EST RA PRESSURE: 3 MMHG
ECHO LV EF PHYSICIAN: 58 %
ECHO PV MAX VELOCITY: 0.9 M/S
ECHO PV PEAK GRADIENT: 3 MMHG
GFR SERPLBLD CREATININE-BSD FMLA CKD-EPI: 63 ML/MIN/{1.73_M2}
GLUCOSE SERPL-MCNC: 88 MG/DL (ref 70–99)
HCT VFR BLD AUTO: 38.4 % (ref 36–48)
HGB BLD-MCNC: 13 G/DL (ref 12–16)
MAGNESIUM SERPL-MCNC: 2.68 MG/DL (ref 1.8–2.4)
MCH RBC QN AUTO: 31.3 PG (ref 26–34)
MCHC RBC AUTO-ENTMCNC: 33.9 G/DL (ref 31–36)
MCV RBC AUTO: 92.4 FL (ref 80–100)
PHOSPHATE SERPL-MCNC: 3.3 MG/DL (ref 2.5–4.9)
PLATELET # BLD AUTO: 327 K/UL (ref 135–450)
PMV BLD AUTO: 6.7 FL (ref 5–10.5)
POTASSIUM SERPL-SCNC: 4.7 MMOL/L (ref 3.5–5.1)
RBC # BLD AUTO: 4.15 M/UL (ref 4–5.2)
SODIUM SERPL-SCNC: 134 MMOL/L (ref 136–145)
TSH SERPL DL<=0.005 MIU/L-ACNC: 1.8 UIU/ML (ref 0.27–4.2)
WBC # BLD AUTO: 4.5 K/UL (ref 4–11)

## 2025-06-17 PROCEDURE — 1200000000 HC SEMI PRIVATE

## 2025-06-17 PROCEDURE — 6370000000 HC RX 637 (ALT 250 FOR IP): Performed by: NURSE PRACTITIONER

## 2025-06-17 PROCEDURE — 83735 ASSAY OF MAGNESIUM: CPT

## 2025-06-17 PROCEDURE — 96372 THER/PROPH/DIAG INJ SC/IM: CPT

## 2025-06-17 PROCEDURE — 84443 ASSAY THYROID STIM HORMONE: CPT

## 2025-06-17 PROCEDURE — 93306 TTE W/DOPPLER COMPLETE: CPT | Performed by: INTERNAL MEDICINE

## 2025-06-17 PROCEDURE — G0378 HOSPITAL OBSERVATION PER HR: HCPCS

## 2025-06-17 PROCEDURE — 6360000002 HC RX W HCPCS: Performed by: STUDENT IN AN ORGANIZED HEALTH CARE EDUCATION/TRAINING PROGRAM

## 2025-06-17 PROCEDURE — 2500000003 HC RX 250 WO HCPCS: Performed by: STUDENT IN AN ORGANIZED HEALTH CARE EDUCATION/TRAINING PROGRAM

## 2025-06-17 PROCEDURE — 36415 COLL VENOUS BLD VENIPUNCTURE: CPT

## 2025-06-17 PROCEDURE — 6370000000 HC RX 637 (ALT 250 FOR IP): Performed by: STUDENT IN AN ORGANIZED HEALTH CARE EDUCATION/TRAINING PROGRAM

## 2025-06-17 PROCEDURE — 84100 ASSAY OF PHOSPHORUS: CPT

## 2025-06-17 PROCEDURE — 96376 TX/PRO/DX INJ SAME DRUG ADON: CPT

## 2025-06-17 PROCEDURE — 99232 SBSQ HOSP IP/OBS MODERATE 35: CPT | Performed by: NURSE PRACTITIONER

## 2025-06-17 PROCEDURE — 6370000000 HC RX 637 (ALT 250 FOR IP): Performed by: INTERNAL MEDICINE

## 2025-06-17 PROCEDURE — 74018 RADEX ABDOMEN 1 VIEW: CPT

## 2025-06-17 PROCEDURE — 6370000000 HC RX 637 (ALT 250 FOR IP)

## 2025-06-17 PROCEDURE — 80048 BASIC METABOLIC PNL TOTAL CA: CPT

## 2025-06-17 PROCEDURE — 99233 SBSQ HOSP IP/OBS HIGH 50: CPT | Performed by: INTERNAL MEDICINE

## 2025-06-17 PROCEDURE — 92610 EVALUATE SWALLOWING FUNCTION: CPT

## 2025-06-17 PROCEDURE — 85027 COMPLETE CBC AUTOMATED: CPT

## 2025-06-17 RX ORDER — POLYETHYLENE GLYCOL 3350 17 G/17G
119 POWDER, FOR SOLUTION ORAL ONCE
Status: COMPLETED | OUTPATIENT
Start: 2025-06-17 | End: 2025-06-17

## 2025-06-17 RX ORDER — POLYETHYLENE GLYCOL 3350 17 G/17G
238 POWDER, FOR SOLUTION ORAL ONCE
Status: DISCONTINUED | OUTPATIENT
Start: 2025-06-17 | End: 2025-06-18 | Stop reason: HOSPADM

## 2025-06-17 RX ORDER — HYDROXYZINE HYDROCHLORIDE 25 MG/1
25 TABLET, FILM COATED ORAL 3 TIMES DAILY PRN
Status: DISCONTINUED | OUTPATIENT
Start: 2025-06-17 | End: 2025-06-18 | Stop reason: HOSPADM

## 2025-06-17 RX ORDER — BISACODYL 5 MG/1
20 TABLET, DELAYED RELEASE ORAL ONCE
Status: COMPLETED | OUTPATIENT
Start: 2025-06-17 | End: 2025-06-17

## 2025-06-17 RX ORDER — POLYETHYLENE GLYCOL 3350 17 G/17G
119 POWDER, FOR SOLUTION ORAL ONCE
Status: DISCONTINUED | OUTPATIENT
Start: 2025-06-18 | End: 2025-06-18 | Stop reason: HOSPADM

## 2025-06-17 RX ADMIN — SODIUM CHLORIDE, PRESERVATIVE FREE 10 ML: 5 INJECTION INTRAVENOUS at 20:14

## 2025-06-17 RX ADMIN — PANTOPRAZOLE SODIUM 40 MG: 40 INJECTION, POWDER, FOR SOLUTION INTRAVENOUS at 09:19

## 2025-06-17 RX ADMIN — HYDROXYZINE HYDROCHLORIDE 25 MG: 25 TABLET ORAL at 21:58

## 2025-06-17 RX ADMIN — BISACODYL 20 MG: 5 TABLET, COATED ORAL at 18:25

## 2025-06-17 RX ADMIN — ASPIRIN 81 MG: 81 TABLET, CHEWABLE ORAL at 09:19

## 2025-06-17 RX ADMIN — SODIUM CHLORIDE, PRESERVATIVE FREE 5 ML: 5 INJECTION INTRAVENOUS at 09:23

## 2025-06-17 RX ADMIN — POLYETHYLENE GLYCOL 3350 119 G: 17 POWDER, FOR SOLUTION ORAL at 18:34

## 2025-06-17 RX ADMIN — POLYETHYLENE GLYCOL (3350) 17 G: 17 POWDER, FOR SOLUTION ORAL at 09:19

## 2025-06-17 RX ADMIN — ATORVASTATIN CALCIUM 40 MG: 40 TABLET, FILM COATED ORAL at 20:14

## 2025-06-17 RX ADMIN — LISINOPRIL 20 MG: 20 TABLET ORAL at 09:19

## 2025-06-17 RX ADMIN — ENOXAPARIN SODIUM 40 MG: 100 INJECTION SUBCUTANEOUS at 09:19

## 2025-06-17 RX ADMIN — SENNOSIDES 8.6 MG: 8.6 TABLET, FILM COATED ORAL at 09:19

## 2025-06-17 RX ADMIN — AMLODIPINE BESYLATE 5 MG: 5 TABLET ORAL at 09:19

## 2025-06-17 NOTE — PROGRESS NOTES
Perfect serve sent to Claudia, message . Call out to GI, pharmacy wants clarification on bowel prep orders. Awaiting response from GI      On call Augustine called back states he will forward message to Claudia. Message was read on perfect serve at 523pm

## 2025-06-17 NOTE — PROGRESS NOTES
Speech Language Pathology  Swallowing Disorders and Dysphagia  Clinical Bedside Swallow Assessment  Facility/Department: [address] MEDICAL-SURGICAL    Instrumentation: Not clinically indicated at this time  Diet recommendation: Clear liquids per GI; ; Meds PO as tolerated  Risk management: upright for all intake, stay upright for at least 30 mins after intake, small bites/sips, oral care 2-3x/day to reduce adverse affects in the event of aspiration, increase physical mobility as able, slow rate of intake, general aspiration precautions, and hold PO and contact SLP if s/s of aspiration or worsening respiratory status develop.    NAME:Adriana Izquierdo  : 1940 (84 y.o.)   MRN: [id number]  ROOM: [address] [place] [address]  ADMISSION DATE: 6/15/2025  Chief Complaint   Patient presents with    Irregular Heart Beat     Patient states that it feel like her heart is going slow then fast, this started after taking her clonidine      Past Medical History:   Diagnosis Date    Anesthesia complication     \" thrashed about\"    Arthritis     Cancer (HCC)     left breast    COVID-19 2020    Hyperlipidemia     Hypertension     S/P chemotherapy, time since greater than 12 weeks     Wears dentures      Past Surgical History:   Procedure Laterality Date    BREAST SURGERY Left     mastectomy    BREAST SURGERY Bilateral     implant    CARPAL TUNNEL RELEASE Right 3/28/2023    RIGHT CARPAL TUNNEL RELEASE performed by Jose Nelson MD at [facility] OR    CATARACT EXTRACTION W/ INTRAOCULAR LENS IMPLANT Right     CHOLECYSTECTOMY      COLONOSCOPY      HEMORRHOID SURGERY      HYSTERECTOMY (CERVIX STATUS UNKNOWN)      INTRACAPSULAR CATARACT EXTRACTION Left 2019    PHACO EMULSIFICATION OF CATARACT WITH INTRAOCULAR LENS IMPLANT LEFT EYE performed by Handy Hughes MD at [facility] [place]    KNEE CARTILAGE SURGERY Left     TOTAL KNEE ARTHROPLASTY Left 2013         DATE ONSET: 6/15/2025    Date of Evaluation: 2025   Evaluating Therapist: Natalee  Good    Recommended Intervention:   Dysphagia treatment  Therapeutic PO Trials when permitted to advance per medical team                 Dysphagia Therapeutic Intervention:   Oral care  Diet tolerance monitoring  Patient/family education  Therapeutic PO trials    Goals:  Short Term Goals:  Timeframe for Short Term Goals: (5 days 06/22/2025)  Goal 1: The patient will tolerate recommended diet with no clinical s/s of aspiration 5/5  Goal 2: The patient will tolerate therapeutic diet upgrade trials with no clinical s/s of aspiration 5/5  Goal 3: The patient/caregiver will demonstrate understanding of compensatory swallow strategies, for improved swallow safety      Long Term Goals:   Timeframe for Long Term Goals: (7 days 06/24/2025)  Goal 1: The patient will tolerate least restrictive diet with no clinical s/s of aspiration or worsening respiratory/pulmonary status      Pt Education: SLP educated the patient re: Role of SLP, rationale for completion of assessment, anatomical components of swallow structures as they pertain to airway protection, results of assessment, recommendations, and POC  Pt Education Response: verbalized understanding, demonstrated understanding, and RN aware    Duration/Frequency of Tx: 1-2x/wk    Individuals Consulted:   Patient   RN      Safety Devices / Report:   All fall risk precautions in place  call light in reach  Safety handoff completed with RN                            Total Treatment Time / Charges       Time in Time out Total Time / units   Swallow Eval/Tx Time  1545 1600 15 min/1 unit     Signature:  Natalee Max M.A. SLP  Speech-Language Pathologist  [place] Lic #[id number]  [phone]

## 2025-06-17 NOTE — PROGRESS NOTES
Progress Note    Admit Date:  6/15/2025    Admitted for palpitations   +abdominal pain with intermittent pulsating pain to abdomen     Subjective:  Ms. Izquierdo is resting in bed.  No current pain but remains to be concerned about the fluttering in her abdomen.  Discussed CTA results with patient.     Objective:   Patient Vitals for the past 4 hrs:   BP Temp Temp src Pulse Resp SpO2   06/17/25 0915 (!) 139/59 97.5 °F (36.4 °C) Oral 90 17 98 %        No intake or output data in the 24 hours ending 06/17/25 1048      Physical Exam:    Gen: No distress. Alert. Pleasant elderly female, thin   Eyes: PERRL. No sclera icterus. No conjunctival injection.   ENT: No discharge. Pharynx clear.   Neck: No JVD.  Trachea midline.  Resp: No accessory muscle use. No crackles. No wheezes. No rhonchi.   CV: Regular rate. Regular rhythm. No murmur.  No rub. No edema.   Capillary Refill: Brisk,< 3 seconds   GI: Non-tender. Non-distended.  Normal bowel sounds.  Skin: Warm and dry. No nodule on exposed extremities. No rash on exposed extremities.   M/S: No cyanosis. No joint deformity. No clubbing.   Neuro: Awake. Grossly nonfocal    Psych: Oriented x 3. No anxiety or agitation.      Scheduled Meds:   lisinopril  20 mg Oral Daily    amLODIPine  5 mg Oral Daily    senna  1 tablet Oral BID    polyethylene glycol  17 g Oral BID    aluminum & magnesium hydroxide-simethicone (MAALOX PLUS) 30 mL, lidocaine viscous hcl (XYLOCAINE) 5 mL (GI COCKTAIL)   Oral Once    pantoprazole (PROTONIX) 40 mg in sodium chloride (PF) 0.9 % 10 mL injection  40 mg IntraVENous Daily    atorvastatin  40 mg Oral Nightly    aspirin  81 mg Oral Daily    sodium chloride flush  5-40 mL IntraVENous 2 times per day    enoxaparin  40 mg SubCUTAneous Daily       Continuous Infusions:   sodium chloride         PRN Meds:  sulfur hexafluoride microspheres, sodium chloride flush, sodium chloride, potassium chloride **OR** potassium alternative oral replacement **OR** potassium

## 2025-06-17 NOTE — FLOWSHEET NOTE
06/16/25 1944   Vital Signs   Temp 98.4 °F (36.9 °C)   Temp Source Oral   Pulse 76   Heart Rate Source Monitor   Respirations 16   /68   MAP (Calculated) 90   BP Location Left upper arm   BP Method Automatic   Patient Position Semi fowlers   Oxygen Therapy   SpO2 98 %     Patient A+Ox4, vitals and assessments done at this time. Bed in lowest position, call light within reach.

## 2025-06-17 NOTE — PROGRESS NOTES
PROGRESS NOTE  S:84 yrs Patient  admitted on 6/15/2025 with Palpitations [R00.2]  Atypical chest pain [R07.89] .  Today, she reports abdominal \"fluttering\" this morning. She reports small bowel movement today. She reports tolerating regular diet.     Exam:   Vitals:    06/17/25 0915   BP: (!) 139/59   Pulse: 90   Resp: 17   Temp: 97.5 °F (36.4 °C)   SpO2: 98%   Generalized: alert, no acute distress  HEENT: sclera clear, anicteric  Neck supple, trachea midline  Heart: RR  Abdomen soft, NT, ND  Extremities: no edema     Medications: Reviewed    Labs:  CBC:   Recent Labs     06/15/25  0625 06/16/25  0542 06/17/25  0509   WBC 4.6 3.9* 4.5   HGB 12.2 12.8 13.0   HCT 34.8* 37.1 38.4   MCV 90.2 90.4 92.4    329 327     BMP:   Recent Labs     06/15/25  0625 06/16/25  0542 06/17/25  0509   * 135* 134*   K 4.5 4.7 4.7   CL 96* 99 99   CO2 23 26 20*   PHOS 3.4 3.4 3.3   BUN 11 13 16   CREATININE 0.9 1.0 0.9     Imaging  CTA abd/pelvis w con, 6/16  No abdominal aortic aneurysm, dissection or stenosis. 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  There is 60-70%  narrowing of the celiac artery and approximately 60% narrowing of the right  renal artery proximally.  No acute findings in the abdomen or pelvis.  Hepatic steatosis.  Mild reticulonodular opacities in the lower lobes which are more prominent  on the left with mild areas of soft tissue attenuation in the airways of the  lower lobes posteriorly. This could represent aspiration or  bronchitis/bronchiolitis.    KUB, 6/17  Nonobstructive bowel gas pattern. Moderate stool burden.    Attending Supervising Physician's Attestation Statement  The patient is a 84 y.o. female. I have performed a history and physical examination of the patient. I discussed the case with LOYD Briscoe    I reviewed the patient's Past

## 2025-06-17 NOTE — PROGRESS NOTES
Patient states that she feels shaky and does not feel like she can finish bowel prep. Patient educated on the importance of colonoscopy. Patient states she understands but she can not finish. Message sent to MD Ramirez. GI not contacted at this time,oncoming nurse educated. GI to be contacted in AM. All safety precautions in place

## 2025-06-17 NOTE — PROGRESS NOTES
[facility]   Progress Note  Cardiology      HPI: Seeing Ms. Izquierdo today for palpitations, elevated Alfie, HTN. She feels better but \"funny in head\" at times. No CP. Tele 92bpm.      Physical Examination:    Vitals:    06/17/25 0415   BP: (!) 158/75   Pulse: 72   Resp: 17   Temp: 98.1 °F (36.7 °C)   SpO2: 98%          Constitutional and General Appearance: NAD   Respiratory:  Normal excursion and expansion without use of accessory muscles  Resp Auscultation: Normal breath sounds without dullness  Cardiovascular:  The apical impulses not displaced  Heart tones are crisp and normal  Cervical veins are not engorged  The carotid upstroke is normal in amplitude and contour without delay or bruit  Normal S1S2, No S3, No Murmur  Peripheral pulses are symmetrical and full  There is no clubbing, cyanosis of the extremities.  No edema  Pedal Pulses: 2+ and equal   Abdomen:  No masses or tenderness  Liver/Spleen: No Abnormalities Noted  Neurological/Psychiatric:  Alert and oriented in all spheres  Moves all extremities well  No abnormalities of mood, affect, memory, mentation, or behavior are noted  Skin: warm and dry    Lab Results   Component Value Date    WBC 4.5 06/17/2025    HGB 13.0 06/17/2025    HCT 38.4 06/17/2025    MCV 92.4 06/17/2025     06/17/2025     Lab Results   Component Value Date    CREATININE 0.9 06/17/2025    BUN 16 06/17/2025     (L) 06/17/2025    K 4.7 06/17/2025    CL 99 06/17/2025    CO2 20 (L) 06/17/2025     Lab Results   Component Value Date    INR 1.03 12/02/2020    PROTIME 11.9 12/02/2020      ECHO 6/16/25    Left Ventricle: Normal left ventricular systolic function with a visually estimated EF of 55 - 60% in limited views. Normal wall motion. Indeterminate diastolic function.    Right Ventricle: Right ventricle size appears normal. Unable to assess systolic function.    Mitral Valve: There is mild annular calcification noted. Mildly thickened, at the anterior leaflet.    Image

## 2025-06-17 NOTE — PLAN OF CARE
Problem: Discharge Planning  Goal: Discharge to home or other facility with appropriate resources  6/17/2025 1111 by Adele Spangler, RN  Outcome: Progressing  6/17/2025 0034 by Jacqueline Gilmore, RN  Outcome: Progressing

## 2025-06-17 NOTE — PLAN OF CARE
Problem: Discharge Planning  Goal: Discharge to home or other facility with appropriate resources  6/17/2025 0034 by Jacqueline Gilmore, RN  Outcome: Progressing  6/16/2025 1147 by Adele Spangler, RN  Outcome: Progressing

## 2025-06-17 NOTE — PROGRESS NOTES
Glycolax was not administered at 430pm due to medication not being sent up from pharmacy. Pharmacy contacted. Medication was supposed to be brought up.

## 2025-06-17 NOTE — PROGRESS NOTES
Spoke with Dr. Bradshaw regarding pt refusing bowel prep and colonoscopy for tomorrow. Ok to resume regular diet. Lindsay Hunter RN

## 2025-06-18 VITALS
SYSTOLIC BLOOD PRESSURE: 132 MMHG | WEIGHT: 128 LBS | BODY MASS INDEX: 21.33 KG/M2 | OXYGEN SATURATION: 98 % | HEIGHT: 65 IN | HEART RATE: 75 BPM | TEMPERATURE: 97.2 F | DIASTOLIC BLOOD PRESSURE: 76 MMHG | RESPIRATION RATE: 16 BRPM

## 2025-06-18 PROCEDURE — 99239 HOSP IP/OBS DSCHRG MGMT >30: CPT | Performed by: INTERNAL MEDICINE

## 2025-06-18 PROCEDURE — 6370000000 HC RX 637 (ALT 250 FOR IP): Performed by: STUDENT IN AN ORGANIZED HEALTH CARE EDUCATION/TRAINING PROGRAM

## 2025-06-18 PROCEDURE — 6360000002 HC RX W HCPCS: Performed by: STUDENT IN AN ORGANIZED HEALTH CARE EDUCATION/TRAINING PROGRAM

## 2025-06-18 PROCEDURE — 2500000003 HC RX 250 WO HCPCS: Performed by: STUDENT IN AN ORGANIZED HEALTH CARE EDUCATION/TRAINING PROGRAM

## 2025-06-18 PROCEDURE — G0378 HOSPITAL OBSERVATION PER HR: HCPCS

## 2025-06-18 PROCEDURE — 96376 TX/PRO/DX INJ SAME DRUG ADON: CPT

## 2025-06-18 PROCEDURE — 6370000000 HC RX 637 (ALT 250 FOR IP): Performed by: INTERNAL MEDICINE

## 2025-06-18 PROCEDURE — 6370000000 HC RX 637 (ALT 250 FOR IP): Performed by: NURSE PRACTITIONER

## 2025-06-18 RX ORDER — LISINOPRIL 20 MG/1
20 TABLET ORAL DAILY
Qty: 30 TABLET | Refills: 1 | Status: SHIPPED | OUTPATIENT
Start: 2025-06-19

## 2025-06-18 RX ORDER — SENNOSIDES 8.6 MG/1
1 TABLET ORAL 2 TIMES DAILY PRN
COMMUNITY
Start: 2025-06-18 | End: 2025-07-18

## 2025-06-18 RX ORDER — AMLODIPINE BESYLATE 5 MG/1
5 TABLET ORAL DAILY
Qty: 30 TABLET | Refills: 1 | Status: SHIPPED | OUTPATIENT
Start: 2025-06-19

## 2025-06-18 RX ADMIN — POLYETHYLENE GLYCOL (3350) 17 G: 17 POWDER, FOR SOLUTION ORAL at 08:58

## 2025-06-18 RX ADMIN — AMLODIPINE BESYLATE 5 MG: 5 TABLET ORAL at 08:57

## 2025-06-18 RX ADMIN — SODIUM CHLORIDE, PRESERVATIVE FREE 10 ML: 5 INJECTION INTRAVENOUS at 08:58

## 2025-06-18 RX ADMIN — SENNOSIDES 8.6 MG: 8.6 TABLET, FILM COATED ORAL at 08:57

## 2025-06-18 RX ADMIN — HYDROXYZINE HYDROCHLORIDE 25 MG: 25 TABLET ORAL at 05:55

## 2025-06-18 RX ADMIN — LISINOPRIL 20 MG: 20 TABLET ORAL at 08:57

## 2025-06-18 RX ADMIN — PANTOPRAZOLE SODIUM 40 MG: 40 INJECTION, POWDER, FOR SOLUTION INTRAVENOUS at 08:57

## 2025-06-18 NOTE — PROGRESS NOTES
Pt sitting on couch in room this AM during shift assessment. She is alert and oriented and accepting of care. Pt vitals stable. Pt reports intermittent \"heart fluttering\" and no abd pain this AM. Pt unable to do colonoscopy today and wants to do outpatient, GI aware and ok with this. Cardiology has already signed off and no new arrythmias or concerns on tele monitoring. Pt continues to state \"I still think something is wrong with me\" but is ok for discharge. Pt aware of need to follow-up with PCP, GI, and cardiology. Awaiting family for transport home.    Bedside Mobility Assessment Tool (BMAT):     Assessment Level 1- Sit and Shake    1. From a semi-reclined position, ask patient to sit up and rotate to a seated position at the side of the bed. Can use the bedrail.    2. Ask patient to reach out and grab your hand and shake making sure patient reaches across his/her midline.   Pass- Patient is able to come to a seated position, maintain core strength. Maintains seated balance while reaching across midline. Move on to Assessment Level 2.     Assessment Level 2- Stretch and Point   1. With patient in seated position at the side of the bed, have patient place both feet on the floor (or stool) with knees no higher than hips.    2. Ask patient to stretch one leg and straighten the knee, then bend the ankle/flex and point the toes. If appropriate, repeat with the other leg.   Pass- Patient is able to demonstrate appropriate quad strength on intended weight bearing limb(s). Move onto Assessment Level 3.     Assessment Level 3- Stand   1. Ask patient to elevate off the bed or chair (seated to standing) using an assistive device (cane, bedrail).    2. Patient should be able to raise buttocks off be and hold for a count of five. May repeat once.   Pass- Patient maintains standing stability for at least 5 seconds, proceed to assessment level 4.    Assessment Level 4- Walk   1. Ask patient to march in place at bedside.    2.

## 2025-06-18 NOTE — DISCHARGE INSTRUCTIONS
Your information:  Name: Adriana Izquierdo  : 1940    Your instructions:    More work is needed to determine the cause of your abdominal pain. Please follow-up with primary care and the GI doctor.     Check your blood pressures at home twice daily every day after at last 5 minutes at rest with feet on the ground and back supported. Write down your results on a piece of paper. Bring your paper as well as your cuff to the doctors office to compare. Call your primary care or cardiologist tomorrow to make an appointment for hospital follow-up.     Follow-up with PCP.  Follow-up with GI. You need outpatient colonoscopy.   Follow-up with cardiology. .    What to do after you leave the hospital:    Recommended diet: regular diet    Recommended activity: activity as tolerated        The following personal items were collected during your admission and were returned to you:    Belongings  Dental Appliances: Uppers, Lowers  Vision - Corrective Lenses: None  Hearing Aid: None  Clothing: Robe, Shirt, Pants, Footwear, Undergarments, At bedside  Jewelry: Earrings  Electronic Devices: None  Weapons (Notify Protective Services/Security): None  Home Medications: None  Valuables Given To: Other (Comment) (son)  Provide Name(s) of Who Valuable(s) Were Given To: ashkan    Information obtained by:  By signing below, I understand that if any problems occur once I leave the hospital I am to contact PCP.  I understand and acknowledge receipt of the instructions indicated above.

## 2025-06-18 NOTE — PLAN OF CARE
Problem: Discharge Planning  Goal: Discharge to home or other facility with appropriate resources  6/18/2025 1013 by Myrna Gunn RN  Outcome: Completed  6/17/2025 2354 by Lindsay Hunter RN  Outcome: Progressing  Flowsheets (Taken 6/17/2025 2015)  Discharge to home or other facility with appropriate resources: Identify barriers to discharge with patient and caregiver     Problem: Cardiovascular - Adult  Goal: Maintains optimal cardiac output and hemodynamic stability  6/18/2025 1013 by Myrna Gunn RN  Outcome: Completed  6/17/2025 2354 by Lindsay Hunter RN  Outcome: Progressing     Problem: Skin/Tissue Integrity - Adult  Goal: Skin integrity remains intact  6/18/2025 1013 by Myrna Gunn RN  Outcome: Completed  6/17/2025 2354 by Lindsay Hunter RN  Outcome: Progressing     Problem: Anxiety  Goal: Will report anxiety at manageable levels  Description: INTERVENTIONS:  1. Administer medication as ordered  2. Teach and rehearse alternative coping skills  3. Provide emotional support with 1:1 interaction with staff  6/18/2025 1013 by Myrna Gunn RN  Outcome: Completed  6/17/2025 2354 by Lindsay Hunter RN  Outcome: Progressing     Problem: Coping  Goal: Pt/Family able to verbalize concerns and demonstrate effective coping strategies  Description: INTERVENTIONS:  1. Assist patient/family to identify coping skills, available support systems and cultural and spiritual values  2. Provide emotional support, including active listening and acknowledgement of concerns of patient and caregivers  3. Reduce environmental stimuli, as able  4. Instruct patient/family in relaxation techniques, as appropriate  5. Assess for spiritual pain/suffering and initiate Spiritual Care, Psychosocial Clinical Specialist consults as needed  6/18/2025 1013 by Myrna Gunn RN  Outcome: Completed  6/17/2025 2354 by Lindsay Hunter RN  Outcome: Progressing     Problem: Pain  Goal: Verbalizes/displays adequate comfort level or

## 2025-06-18 NOTE — CARE COORDINATION
CASE MANAGEMENT DISCHARGE SUMMARY      Discharge to: home    IMM given: 6/18/2025 -FELICIA delivered second IMM to patient. Verbal explanation provided of 4 hours to review notice. Patient voiced understanding and is agreeable to discharge.      Transportation:      Family/car: yes       Confirmed discharge plan with:     Patient: yes     Family:  no - pt called herself     RN name: Myrna LAW    Note: Discharging nurse to complete KUMAR, reconcile AVS, and place final copy with patient's discharge packet. RN to ensure that written prescriptions for  Level II medications are sent with patient to the facility as per protocol.

## 2025-06-18 NOTE — DISCHARGE INSTR - COC
Continuity of Care Form    Patient Name: Adriana Izquierdo   :  1940  MRN:  [id number]    Admit date:  6/15/2025  Discharge date:  ***    Code Status Order: Full Code   Advance Directives:     Admitting Physician:  Pierec Ramirez MD  PCP: Aura Kim MD    Discharging Nurse: ***  Discharging Hospital Unit/Room#: 0202/0202-01  Discharging Unit Phone Number: ***    Emergency Contact:   Extended Emergency Contact Information  Primary Emergency Contact: DavidmahoganyLazaro  Address: [address] [place] [address]  Home Phone: [phone]  Mobile Phone: [phone]  Relation: Child  Secondary Emergency Contact: [email or web address]  Home Phone: [phone]  Mobile Phone: [phone]  Relation: Child  Preferred language: English   needed? No    Past Surgical History:  Past Surgical History:   Procedure Laterality Date    BREAST SURGERY Left     mastectomy    BREAST SURGERY Bilateral     implant    CARPAL TUNNEL RELEASE Right 3/28/2023    RIGHT CARPAL TUNNEL RELEASE performed by Jose Nelson MD at [facility] OR    CATARACT EXTRACTION W/ INTRAOCULAR LENS IMPLANT Right     CHOLECYSTECTOMY      COLONOSCOPY      HEMORRHOID SURGERY      HYSTERECTOMY (CERVIX STATUS UNKNOWN)      INTRACAPSULAR CATARACT EXTRACTION Left 2019    PHACO EMULSIFICATION OF CATARACT WITH INTRAOCULAR LENS IMPLANT LEFT EYE performed by Handy Hughes MD at [facility] OR    KNEE CARTILAGE SURGERY Left     TOTAL KNEE ARTHROPLASTY Left 2013       Immunization History:   Immunization History   Administered Date(s) Administered    COVID-19, J&J, (age 18y+), IM, 0.5 mL 2021    COVID-19, PFIZER PURPLE top, DILUTE for use, (age 12 y+), 30mcg/0.3mL 12/15/2021    Influenza Virus Vaccine 10/28/2020       Active Problems:  Patient Active Problem List   Diagnosis Code    HTN (hypertension) I10    HLD (hyperlipidemia) E78.5    Abnormal EKG R94.31    Primary localized osteoarthrosis, lower leg M17.10    Multifocal  Concerns:365717294}    Impairments/Disabilities:      { KUMAR Impairments/Disabilities:214901361}    Nutrition Therapy:  Current Nutrition Therapy:   { KUMAR Diet List:133533910}    Routes of Feeding: {[facility] DME Other Feedings:969468121}  Liquids: {Slp liquid thickness:63587}  Daily Fluid Restriction: {[facility] DME Yes amt example:752033676}  Last Modified Barium Swallow with Video (Video Swallowing Test): {Done Not Done Date:}    Treatments at the Time of Hospital Discharge:   Respiratory Treatments: ***  Oxygen Therapy:  {Therapy; copd oxygen:86388}  Ventilator:    { CC Vent List:955584187}    Rehab Therapies: {THERAPEUTIC INTERVENTION:3276992087}  Weight Bearing Status/Restrictions: {[facility] Weight Bearin}  Other Medical Equipment (for information only, NOT a DME order):  {EQUIPMENT:513689157}  Other Treatments: ***    Patient's personal belongings (please select all that are sent with patient):  {[facility] DME Belongings:699826089}    RN SIGNATURE:  {Esignature:669502538}    CASE MANAGEMENT/SOCIAL WORK SECTION    Inpatient Status Date: ***    Readmission Risk Assessment Score:  [facility] RISK OF UNPLANNED READMISSION 2.0             11.9 Total Score        Discharging to Facility/ Agency   Name:   Address:  Phone:  Fax:    Dialysis Facility (if applicable)   Name:  Address:  Dialysis Schedule:  Phone:  Fax:    / signature: {Esignature:843215172}    PHYSICIAN SECTION    Prognosis: {Prognosis:6901932438}    Condition at Discharge: { Patient Condition:607442197}    Rehab Potential (if transferring to Rehab): {Prognosis:1276915304}    Recommended Labs or Other Treatments After Discharge: ***    Physician Certification: I certify the above information and transfer of Adriana Izquierdo  is necessary for the continuing treatment of the diagnosis listed and that she requires {Admit to Appropriate Level of Care:14712} for {GREATER/LESS:359397142} 30 days.     Update Admission H&P: {[facility] DME Changes

## 2025-06-18 NOTE — PLAN OF CARE
Problem: Discharge Planning  Goal: Discharge to home or other facility with appropriate resources  6/17/2025 2354 by Lindsay Hunter, RN  Outcome: Progressing  Flowsheets (Taken 6/17/2025 2015)  Discharge to home or other facility with appropriate resources: Identify barriers to discharge with patient and caregiver  6/17/2025 1111 by Adele Spangler, RN  Outcome: Progressing     Problem: Cardiovascular - Adult  Goal: Maintains optimal cardiac output and hemodynamic stability  Outcome: Progressing     Problem: Skin/Tissue Integrity - Adult  Goal: Skin integrity remains intact  Outcome: Progressing     Problem: Anxiety  Goal: Will report anxiety at manageable levels  Description: INTERVENTIONS:  1. Administer medication as ordered  2. Teach and rehearse alternative coping skills  3. Provide emotional support with 1:1 interaction with staff  Outcome: Progressing     Problem: Coping  Goal: Pt/Family able to verbalize concerns and demonstrate effective coping strategies  Description: INTERVENTIONS:  1. Assist patient/family to identify coping skills, available support systems and cultural and spiritual values  2. Provide emotional support, including active listening and acknowledgement of concerns of patient and caregivers  3. Reduce environmental stimuli, as able  4. Instruct patient/family in relaxation techniques, as appropriate  5. Assess for spiritual pain/suffering and initiate Spiritual Care, Psychosocial Clinical Specialist consults as needed  Outcome: Progressing

## 2025-06-18 NOTE — PROGRESS NOTES
Perfect serve sent to Dr. Blair regarding if MD could come see this pt. This RN feels the pt is making herself more anxious and feels if the doc came to assess that it could help calm the pt's nerves. She said she doesn't have anxiety but she really seems very anxious thinking something is wrong and it all started this afternoon when she started bowel prep that made her start shaking, so now she is refusing her prep and colonoscopy and now she is focused on her BP going up. They did start lisinopril and norvasc on her which did help earlier but her BP is back up to 166/73 but she is shaking in the room talking to me telling me there is something wrong with her. She is on telemetry and has been SR. Lindsay Hunter, RN

## 2025-06-18 NOTE — PROGRESS NOTES
Pt resting. Resp e/e. Shift assessment completed and charted. No needs. Will monitor. Lindsay Hunter RN

## 2025-06-18 NOTE — DISCHARGE SUMMARY
Hospital Medicine Discharge Summary    Patient: Adriana Izquierdo     Gender: female  : 1940   Age: 84 y.o.  MRN: [id number]    Admitting Physician: Pierce Ramirez MD  Discharge Physician: Betty Jenkins DO    Code Status: Full Code     Admit Date: 6/15/2025   Discharge Date: 2025    Discharge Diagnoses:    Active Hospital Problems    Diagnosis Date Noted    Chest pain [R07.9] 2025    Elevated troponin [R79.89] 2025    Atypical chest pain [R07.89] 2025    Lower abdominal pain [R10.30] 2025    Epigastric pain [R10.13] 2025    Palpitations [R00.2] 06/15/2025     Condition at Discharge: Stable    Hospital Course:     Palpitations  Epigastric Discomfort  Pulsating abdominal pain   - cardiology consulted  - started on aspirin and statin  - monitor on telemetry  - discussed with Dr. Damon, no stress test  - will check echo and further recommendations  - troponin flat   - cardiology deferred GI work-up to primary, will order GI consult   - discussed with cardiology will do CTA as she is complaining of intermittent pulsating abdominal pain and high blood pressure.   - CTA as above and discussed with Dr. Patel in vascular. He reviewed CT and common finding, artery is open with good renal perfusion.      HTN- elevated--improved  - cardiology increased Lisinopirl 10--20  - added HCTZ, although concerned with recent hyponatremia --discussed with Dr. Damon will add Norvasc instead of HCTZ  - monitor blood pressure and adjust regimen   - improved with addition of Norvasc      Recent admission with hyponatremia--improved  - noted to be 129 on admission---135 today  - monitor BMP     HLD  - Continue statin       Bilateral vitreous opacities & brow ptosis.   - follows with ophthalmology      Recent admission for multifocal PNA  - CT as above  - will place speech evaluation and treat although likely 2/2 recent PNA and previous CT stated improved. Will need follow-up outpatient       Hx of breast  collection is identified.     Soft tissue/bones: No fracture or destructive bone lesion is identified.     CTA PELVIS:     Vascular: The bilateral iliac and visualized femoral arteries are normal in  course and caliber.  There is no dissection or stenosis.  Multifocal  atheromatous plaque is noted.     Nonvascular: The bladder is nondistended and otherwise unremarkable there has  been a hysterectomy.  There is no free fluid or focal fluid collection.     Soft tissue/bones: No fracture or destructive bone lesion is identified.     IMPRESSION:  1. No abdominal aortic aneurysm, dissection or stenosis. 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  There is 60-70%  narrowing of the celiac artery and approximately 60% narrowing of the right  renal artery proximally.  2. No acute findings in the abdomen or pelvis.  3. Hepatic steatosis.  4. Mild reticulonodular opacities in the lower lobes which are more prominent  on the left with mild areas of soft tissue attenuation in the airways of the  lower lobes posteriorly. This could represent aspiration or  bronchitis/bronchiolitis.    The patient was seen and examined on day of discharge and this discharge summary is in conjunction with any daily progress note from day of discharge. Time Spent on discharge is more than 30 minutes in the examination, evaluation, counseling and review of medications and discharge plan.        Signed:    Betty Jenkins DO   6/18/2025      Thank you Aura Kim MD for the opportunity to be involved in this patient's care. If you have any questions or concerns please feel free to contact me at [facility].

## 2025-06-18 NOTE — PROGRESS NOTES
PROGRESS NOTE  S:84 yrs Patient  admitted on 6/15/2025 with Palpitations [R00.2]  Atypical chest pain [R07.89]  Chest pain [R07.9] .  Today, she reports not tolerating bowel prep due to feeling \"jittery.\"    Exam:   Vitals:    06/18/25 0855   BP: 132/76   Pulse: 75   Resp: 16   Temp: 97.2 °F (36.2 °C)   SpO2: 98%   Generalized: alert, no acute distress  HEENT: sclera clear, anicteric  Neck supple, trachea midline  Heart: RR  Abdomen soft, NT, ND  Extremities: no edema     Medications: Reviewed    Assessment   85 yo female with pmx of HTN, HLD, and breast cancer s/p chemotherapy admitted with palpitations. Abdominal spasms likely secondary to IBS or constipation. Scheduled colonoscopy aborted due to inability to tolerate prep.      Plan  Continue supportive care  PPI daily  Regular high fiber diet  Will arrange outpatient colonoscopy     SHANTA Murguia CNP  11:49 AM 6/18/2025

## 2025-06-18 NOTE — CARE COORDINATION
Reviewed chart, plan for colonoscopy today. Lives with grandson, plans to return at [place]. CM following.

## 2025-07-16 PROBLEM — R79.89 ELEVATED TROPONIN: Status: RESOLVED | Noted: 2025-06-16 | Resolved: 2025-07-16

## 2025-08-21 ENCOUNTER — OFFICE VISIT (OUTPATIENT)
Dept: CARDIOLOGY CLINIC | Age: 85
End: 2025-08-21
Payer: MEDICARE

## 2025-08-21 VITALS
HEART RATE: 72 BPM | BODY MASS INDEX: 21.33 KG/M2 | WEIGHT: 128 LBS | HEIGHT: 65 IN | OXYGEN SATURATION: 100 % | DIASTOLIC BLOOD PRESSURE: 64 MMHG | SYSTOLIC BLOOD PRESSURE: 118 MMHG

## 2025-08-21 DIAGNOSIS — E78.2 MIXED HYPERLIPIDEMIA: Primary | ICD-10-CM

## 2025-08-21 DIAGNOSIS — R00.2 PALPITATIONS: ICD-10-CM

## 2025-08-21 DIAGNOSIS — I10 PRIMARY HYPERTENSION: ICD-10-CM

## 2025-08-21 PROCEDURE — G8420 CALC BMI NORM PARAMETERS: HCPCS | Performed by: INTERNAL MEDICINE

## 2025-08-21 PROCEDURE — 1090F PRES/ABSN URINE INCON ASSESS: CPT | Performed by: INTERNAL MEDICINE

## 2025-08-21 PROCEDURE — G8427 DOCREV CUR MEDS BY ELIG CLIN: HCPCS | Performed by: INTERNAL MEDICINE

## 2025-08-21 PROCEDURE — 3078F DIAST BP <80 MM HG: CPT | Performed by: INTERNAL MEDICINE

## 2025-08-21 PROCEDURE — 99214 OFFICE O/P EST MOD 30 MIN: CPT | Performed by: INTERNAL MEDICINE

## 2025-08-21 PROCEDURE — 1123F ACP DISCUSS/DSCN MKR DOCD: CPT | Performed by: INTERNAL MEDICINE

## 2025-08-21 PROCEDURE — 1159F MED LIST DOCD IN RCRD: CPT | Performed by: INTERNAL MEDICINE

## 2025-08-21 PROCEDURE — G8399 PT W/DXA RESULTS DOCUMENT: HCPCS | Performed by: INTERNAL MEDICINE

## 2025-08-21 PROCEDURE — 3074F SYST BP LT 130 MM HG: CPT | Performed by: INTERNAL MEDICINE

## 2025-08-21 PROCEDURE — 1036F TOBACCO NON-USER: CPT | Performed by: INTERNAL MEDICINE

## (undated) DEVICE — BANDAGE COMPR W4INXL12FT E DISP ESMARCH EVEN

## (undated) DEVICE — CORD ES L12FT BPLR FRCP

## (undated) DEVICE — SURGICAL PROCEDURE PACK EYE CLERMONT

## (undated) DEVICE — PADDING CAST W4INXL4YD ST COT RAYON MICROPLEATED HIGHLY

## (undated) DEVICE — GLOVE ORANGE PI 7 1/2   MSG9075

## (undated) DEVICE — SOLUTION IV IRRIG 500ML 0.9% SODIUM CHL 2F7123

## (undated) DEVICE — ZIMMER® STERILE DISPOSABLE TOURNIQUET CUFF WITH PLC, DUAL PORT, SINGLE BLADDER, 18 IN. (46 CM)

## (undated) DEVICE — GAUZE,SPONGE,4"X4",8PLY,STRL,LF,10/TRAY: Brand: MEDLINE

## (undated) DEVICE — 1010 S-DRAPE TOWEL DRAPE 10/BX: Brand: STERI-DRAPE™

## (undated) DEVICE — GOWN,SIRUS,NON REINFRCD,LARGE,SET IN SL: Brand: MEDLINE

## (undated) DEVICE — CANNULA NSL 13FT TUBE AD ETCO2 DIV SAMP M

## (undated) DEVICE — SUTURE CHROMIC GUT SZ 4-0 L18IN ABSRB BRN L19MM PS-2 3/8 1637G

## (undated) DEVICE — GLOVE SURG SZ 8 L12IN FNGR THK94MIL STD WHT LTX FREE

## (undated) DEVICE — WRAP COHESIVE W2INXL5YD TAN SELF ADH BNDG HND NON STERILE TEAR CARING

## (undated) DEVICE — NEEDLE HYPO 25GA L1.5IN BLU POLYPR HUB S STL REG BVL STR

## (undated) DEVICE — GLOVE ORANGE PI 8   MSG9080

## (undated) DEVICE — Device

## (undated) DEVICE — 6 ML SYRINGE LUER-LOCK TIP: Brand: MONOJECT

## (undated) DEVICE — NEEDLE HYPO 18GA L1.5IN THN WALL PIVOTING SHLD BVL ORIENTED

## (undated) DEVICE — PREP SOL PVP IODINE 4%  4 OZ/BTL

## (undated) DEVICE — SOLUTION IV IRRIG WATER 1000ML POUR BRL 2F7114

## (undated) DEVICE — GLOVE ORANGE PI 7   MSG9070

## (undated) DEVICE — UNDERGLOVE SURG SZ 8 FNGR THK0.21MIL GRN LTX BEAD CUF

## (undated) DEVICE — SYRINGE MED 10ML LUERLOCK TIP W/O SFTY DISP